# Patient Record
Sex: FEMALE | Race: WHITE | Employment: FULL TIME | URBAN - METROPOLITAN AREA
[De-identification: names, ages, dates, MRNs, and addresses within clinical notes are randomized per-mention and may not be internally consistent; named-entity substitution may affect disease eponyms.]

---

## 2017-03-07 DIAGNOSIS — I10 ESSENTIAL HYPERTENSION, BENIGN: ICD-10-CM

## 2017-03-07 RX ORDER — HYDROCHLOROTHIAZIDE 25 MG/1
TABLET ORAL
Qty: 30 TAB | Refills: 0 | Status: SHIPPED | OUTPATIENT
Start: 2017-03-07 | End: 2017-04-03 | Stop reason: SDUPTHER

## 2017-04-03 DIAGNOSIS — I10 ESSENTIAL HYPERTENSION, BENIGN: ICD-10-CM

## 2017-04-03 RX ORDER — HYDROCHLOROTHIAZIDE 25 MG/1
TABLET ORAL
Qty: 30 TAB | Refills: 0 | Status: SHIPPED | OUTPATIENT
Start: 2017-04-03 | End: 2017-05-15 | Stop reason: SDUPTHER

## 2017-04-03 NOTE — TELEPHONE ENCOUNTER
Overdue for f/u & labs. 1 month provided last time, but no evidence pt was ever contacted. shopp message sent. 1 month refilled.

## 2017-05-15 ENCOUNTER — OFFICE VISIT (OUTPATIENT)
Dept: INTERNAL MEDICINE CLINIC | Age: 72
End: 2017-05-15

## 2017-05-15 VITALS
BODY MASS INDEX: 42.88 KG/M2 | HEIGHT: 63 IN | WEIGHT: 242 LBS | RESPIRATION RATE: 16 BRPM | HEART RATE: 74 BPM | TEMPERATURE: 98 F | SYSTOLIC BLOOD PRESSURE: 140 MMHG | DIASTOLIC BLOOD PRESSURE: 96 MMHG | OXYGEN SATURATION: 96 %

## 2017-05-15 DIAGNOSIS — Z00.00 ROUTINE PHYSICAL EXAMINATION: Primary | ICD-10-CM

## 2017-05-15 DIAGNOSIS — Z13.820 OSTEOPOROSIS SCREENING: ICD-10-CM

## 2017-05-15 DIAGNOSIS — I10 ESSENTIAL HYPERTENSION, BENIGN: ICD-10-CM

## 2017-05-15 DIAGNOSIS — Z23 ENCOUNTER FOR IMMUNIZATION: ICD-10-CM

## 2017-05-15 DIAGNOSIS — J30.1 ALLERGIC RHINITIS DUE TO POLLEN, UNSPECIFIED RHINITIS SEASONALITY: ICD-10-CM

## 2017-05-15 DIAGNOSIS — F41.9 ANXIETY: ICD-10-CM

## 2017-05-15 DIAGNOSIS — Z12.39 BREAST CANCER SCREENING: ICD-10-CM

## 2017-05-15 DIAGNOSIS — Z71.89 ACP (ADVANCE CARE PLANNING): ICD-10-CM

## 2017-05-15 DIAGNOSIS — E66.01 MORBID OBESITY WITH BMI OF 40.0-44.9, ADULT (HCC): ICD-10-CM

## 2017-05-15 RX ORDER — HYDROCHLOROTHIAZIDE 25 MG/1
TABLET ORAL
Qty: 90 TAB | Refills: 1 | Status: SHIPPED | OUTPATIENT
Start: 2017-05-15 | End: 2017-11-07 | Stop reason: SDUPTHER

## 2017-05-15 RX ORDER — MOMETASONE FUROATE 50 UG/1
2 SPRAY, METERED NASAL AS NEEDED
Qty: 1 CONTAINER | Refills: 2 | Status: SHIPPED | OUTPATIENT
Start: 2017-05-15 | End: 2018-03-08 | Stop reason: SDUPTHER

## 2017-05-15 RX ORDER — ALPRAZOLAM 0.25 MG/1
TABLET ORAL
Qty: 10 TAB | Refills: 0 | Status: SHIPPED | OUTPATIENT
Start: 2017-05-15 | End: 2017-12-26 | Stop reason: SDUPTHER

## 2017-05-15 NOTE — ACP (ADVANCE CARE PLANNING)
Advance Care Planning (ACP) Provider Note - Comprehensive     Date of ACP Conversation: 05/15/17  Persons included in Conversation:  patient      Authorized Decision Maker (if patient is incapable of making informed decisions): This person is: Other Legally Authorized Decision Maker (e.g. Next of Kin)        General ACP for ALL Patients with Decision Making Capacity:  Importance of advance care planning, including choosing a healthcare agent to communicate patient's healthcare decisions if patient lost the ability to make decisions, such as after a sudden illness or accident  Understanding of the healthcare agent role was assessed and information provided  Opportunity offered to explore how cultural, Moravian, spiritual, or personal beliefs would affect decisions for future care     For Serious or Chronic Illness:  Her medical problems were reviewed with them. Understanding of medical condition    Understanding of CPR, goals and expected outcomes, benefits and burdens discussed. Information on CPR success rates provided (e.g. for CPR in hospital, survival to d/c at two weeks is 22%, for chronically ill or elderly/frail survival is less than 3%); Individual asked to communicate understanding of information in his/her own words. Interventions Provided:  Recommended completion of Advance Directive form after review of ACP materials and conversation with prospective healthcare agent   Recommended communicating the plan and making copies for the healthcare agent, personal physician, and others as appropriate (e.g., health system)  Recommended review of completed ACP document annually or upon change in health status      She  has NO advanced directive  - add't info provided. Reviewed DNR/DNI and patient is not interested.

## 2017-05-15 NOTE — PROGRESS NOTES
Laurel Wallace is a 71 y.o. female  who present for routine immunizations. she  denies any symptoms , reactions or allergies that would exclude them from being immunized today. Risks and adverse reactions were discussed and the VIS was given to them. All questions were addressed. she was observed for 5 min post injection. There were no reactions observed.     Marcus Allan RN

## 2017-05-15 NOTE — PROGRESS NOTES
Eliazar Azul is a 71 y.o. female who was seen in clinic today (5/15/2017) for a full physical.      Assessment & Plan: Albert Bains was seen today for complete physical.  Diagnoses and all orders for this visit:    Routine physical examination  -     HEPATITIS C AB  -     METABOLIC PANEL, COMPREHENSIVE  -     CBC W/O DIFF  -     HEMOGLOBIN A1C WITH EAG    ACP (advance care planning)  -     FULL CODE    Encounter for immunization  -     TETANUS, [address] [facility] (TDAP), IN INDIVIDS. >=7, IM  -     PNEUMOCOCCAL POLYSACCHARIDE VACCINE, 23-VALENT, ADULT OR IMMUNOSUPPRESSED PT DOSE,  -     MT IMMUNIZ,ADMIN,EACH ADDL  -     varicella zoster vacine live (ZOSTAVAX) 19,400 unit/0.65 mL susr injection; 1 Vial by SubCUTAneous route once for 1 dose. Breast cancer screening  -     MICK MAMMO BI SCREENING INCL CAD; Future    Osteoporosis screening  -     DEXA BONE DENSITY STUDY AXIAL; Future    Essential hypertension, benign- elevated currently, previously at goal, will continue to monitor (check amb BP, she will update me if it does not improve  -     hydroCHLOROthiazide (HYDRODIURIL) 25 mg tablet; TAKE 1 TABLET BY MOUTH DAILY. Anxiety- stable, reviewed treatment options with her, reviewed life style changes to help reduce stress, continue current treatment. [facility]  reviewed    -     ALPRAZolam (XANAX) 0.25 mg tablet; TAKE 1 TABLET BY MOUTH DAILY AS NEEDED FOR ANXIETY    Allergic rhinitis due to pollen, unspecified rhinitis seasonality- stable, continue current treatment, did review saline nasal rinses and OTC meds if gets worse  -     mometasone (NASONEX) 50 mcg/actuation nasal spray; 2 Sprays by Both Nostrils route as needed. Morbid obesity with BMI of 40.0-44.9, adult ([facility] [address]- poorly controlled, needs improvement, worsening, I have reviewed/discussed the above normal BMI with the patient.   I have recommended the following interventions: encourage exercise and lifestyle education regarding diet.          Follow-up Disposition:  Return in about 1 year (around 5/15/2018) for FULL PHYSICAL - 30 minutes. ------------------------------------------------------------------------------------------    Subjective:   Routine Physical Exam    End of Life Planning: This was discussed with her today and she has NO advanced directive  - add't info provided. Reviewed DNR/DNI and patient is not interested. Depression Screen:  PHQ over the last two weeks 5/15/2017   Little interest or pleasure in doing things Not at all   Feeling down, depressed or hopeless Not at all   Total Score PHQ 2 0         Fall Risk:   Fall Risk Assessment, last 12 mths 5/15/2017   Able to walk? Yes   Fall in past 12 months? No       Abuse Screen:  Abuse Screening Questionnaire 5/15/2017   Do you ever feel afraid of your partner? N   Are you in a relationship with someone who physically or mentally threatens you? N   Is it safe for you to go home? Y         Alcohol Risk Factor Screening: On any occasion during the past 3 months, have you had more than 3 drinks containing alcohol? No  Do you average more than 7 drinks per week? No    Hearing Loss:  denies any hearing loss    Activities of Daily Living:  Self-care. Requires assistance with: no ADLs    Adult Nutrition Screen:  No risk factors noted. Health Maintenance  Daily Aspirin: yes  Bone Density: not UTD - order placed  Glaucoma Screening: records requested    Immunizations:    Influenza: up to date. Tetanus: not up to date - will do today. Shingles: not UTD - script provided. Pneumonia: will do today. Cancer screening:    Cervical: declined, reviewed guidelines. Breast: order placed, reviewed guidelines, reviewed SBE with her. Colon: records requested, guidelines reviewed, referral placed for colonoscopy.          Patient Care Team:  Rachell Heard MD as PCP - General (Internal Medicine)  Michel Temple MD as Physician (Ophthalmology)  Kina Flores Casey Arreaga MD as Physician (Gastroenterology)       In addition to the above issues we also reviewed the following acute and/or chronic problems:    Cardiovascular Review  The patient has hypertension and obesity. Since last visit: weight is up, she attributes to decrease activity. She reports taking medications as instructed, no medication side effects noted, checking BP sporadically. Diet and Lifestyle: generally follows a low fat low cholesterol diet, generally follows a low sodium diet, sedentary. Lab review: labs reviewed and discussed with patient. Allergies  She has Allergic Rhinitis that vary in seasonal presentation. Current symptoms: rhinorrhea, postnasal drip, congestion and nothing. She reports: taking medications as instructed, no medication side effects noted. The following sections were reviewed & updated as appropriate: PMH, PSH, FH, and SH. Patient Active Problem List   Diagnosis Code    Essential hypertension, benign I10    Allergic rhinitis J30.9    Osteoarthritis M19.90    Obesity E66.9    DIONI (generalized anxiety disorder) F41.1    Vitamin D deficiency E55.9    Hyperlipidemia E78.5    Gastroesophageal reflux disease without esophagitis K21.9          Prior to Admission medications    Medication Sig Start Date End Date Taking? Authorizing Provider   hydroCHLOROthiazide (HYDRODIURIL) 25 mg tablet TAKE 1 TABLET BY MOUTH DAILY. 4/3/17  Yes Tomas Ely MD   ALPRAZolam Euell Melissa) 0.25 mg tablet TAKE 1 TABLET BY MOUTH DAILY AS NEEDED FOR ANXIETY 6/1/16  Yes Tomas Ely MD   mometasone (NASONEX) 50 mcg/actuation nasal spray 2 Sprays by Both Nostrils route as needed. 6/1/16  Yes Tomas Ely MD   red yeast rice 600 mg tab Take 1 Tab by mouth two (2) times a day. 10/22/15  Yes Tomas Ely MD   acetaminophen (TYLENOL EXTRA STRENGTH) 500 mg tablet Take 500 mg by mouth as needed for Pain.    Yes Historical Provider   lactobacillus rhamnosus gg 10 billion cell (CULTURELLE) 10 billion cell capsule Take 1 capsule by mouth daily. Yes Historical Provider   cholecalciferol, vitamin D3, (VITAMIN D3) 2,000 unit tab Take 1 tablet by mouth daily. Yes Historical Provider   aspirin 81 mg tablet Take 81 mg by mouth daily. Yes Historical Provider          Allergies   Allergen Reactions    Diovan [Valsartan] Other (comments)     headaches    Lisinopril Other (comments)     headaches    Pcn [Penicillins] Rash    Statins-Hmg-Coa Reductase Inhibitors Myalgia              Review of Systems   Constitutional: Negative for chills and fever. Respiratory: Negative for cough and shortness of breath. Cardiovascular: Negative for chest pain and palpitations. Gastrointestinal: Negative for abdominal pain, blood in stool, constipation, diarrhea, heartburn, nausea and vomiting. Musculoskeletal: Positive for joint pain (joints, worse w/ weight). Negative for myalgias. Neurological: Negative for tingling, focal weakness and headaches. Endo/Heme/Allergies: Does not bruise/bleed easily. Psychiatric/Behavioral: Negative for depression. The patient is not nervous/anxious and does not have insomnia. Increase in stress around her grandson. Father passed last year, typical teenage issues. Lives in [place]         Objective:   Physical Exam   Constitutional: She appears well-developed. No distress. HENT:   Right Ear: Tympanic membrane, external ear and ear canal normal.   Left Ear: Tympanic membrane, external ear and ear canal normal.   Nose: Nose normal.   Mouth/Throat: Uvula is midline, oropharynx is clear and moist and mucous membranes are normal. No posterior oropharyngeal erythema. Right ear is: 50% occluded with wet cerumen. Eyes: Conjunctivae and lids are normal. No scleral icterus. Neck: Neck supple. Carotid bruit is not present. No thyromegaly present. Cardiovascular: Regular rhythm and normal heart sounds. No murmur heard.   Pulses: Dorsalis pedis pulses are 2+ on the right side, and 2+ on the left side. Posterior tibial pulses are 2+ on the right side, and 2+ on the left side. Pulmonary/Chest: Effort normal and breath sounds normal. She has no wheezes. She has no rales. Abdominal: Soft. Bowel sounds are normal. She exhibits no mass. There is no hepatosplenomegaly. There is no tenderness. Musculoskeletal: Normal range of motion. She exhibits no edema. Cervical back: Normal.        Thoracic back: She exhibits no bony tenderness. Lumbar back: Normal.   Lymphadenopathy:     She has no cervical adenopathy. Neurological: She has normal strength. No sensory deficit. Skin: No rash noted. Psychiatric: She has a normal mood and affect. Her behavior is normal.          Visit Vitals    BP (!) 140/96    Pulse 74    Temp 98 °F (36.7 °C) (Oral)    Resp 16    Ht 5' 2.8\" (1.595 m)    Wt 242 lb (109.8 kg)    SpO2 96%    BMI 43.14 kg/m2          Advised her to call back or return to office if symptoms worsen/change/persist.  Discussed expected course/resolution/complications of diagnosis in detail with patient. Medication risks/benefits/costs/interactions/alternatives discussed with patient. She was given an after visit summary which includes diagnoses, current medications, & vitals. She expressed understanding with the diagnosis and plan.         Gavino Hartman MD

## 2017-11-07 DIAGNOSIS — I10 ESSENTIAL HYPERTENSION, BENIGN: ICD-10-CM

## 2017-11-07 RX ORDER — HYDROCHLOROTHIAZIDE 25 MG/1
TABLET ORAL
Qty: 7 TAB | Refills: 0 | Status: SHIPPED | OUTPATIENT
Start: 2017-11-07 | End: 2018-02-18 | Stop reason: SDUPTHER

## 2017-11-07 NOTE — TELEPHONE ENCOUNTER
Please call patient and send mychart. Never had labs done from the summer. 1 week of meds sent to the pharmacy, she MUST, have labs done this week.

## 2017-11-08 ENCOUNTER — PATIENT MESSAGE (OUTPATIENT)
Dept: INTERNAL MEDICINE CLINIC | Age: 72
End: 2017-11-08

## 2017-11-11 DIAGNOSIS — I10 ESSENTIAL HYPERTENSION, BENIGN: ICD-10-CM

## 2017-11-12 RX ORDER — HYDROCHLOROTHIAZIDE 25 MG/1
TABLET ORAL
Qty: 7 TAB | Refills: 0 | OUTPATIENT
Start: 2017-11-12

## 2017-11-13 NOTE — TELEPHONE ENCOUNTER
Called pt and left a detailed message informing her she is due now for labs and she should have about 3 more days of medication left, and to please call our office as soon as she receives this message.

## 2017-11-13 NOTE — TELEPHONE ENCOUNTER
Please call pt. Pt needs to get labs done ASAP. She was previously given 7 days of meds and instructed to get labs done. She did not. Should have 3 more days of meds. Needs to get labs done.

## 2017-12-26 ENCOUNTER — OFFICE VISIT (OUTPATIENT)
Dept: INTERNAL MEDICINE CLINIC | Age: 72
End: 2017-12-26

## 2017-12-26 VITALS
WEIGHT: 235 LBS | HEART RATE: 80 BPM | SYSTOLIC BLOOD PRESSURE: 140 MMHG | DIASTOLIC BLOOD PRESSURE: 84 MMHG | RESPIRATION RATE: 16 BRPM | HEIGHT: 63 IN | OXYGEN SATURATION: 98 % | TEMPERATURE: 98 F | BODY MASS INDEX: 41.64 KG/M2

## 2017-12-26 DIAGNOSIS — E66.01 OBESITY, MORBID (HCC): ICD-10-CM

## 2017-12-26 DIAGNOSIS — F41.9 ANXIETY: ICD-10-CM

## 2017-12-26 DIAGNOSIS — M79.604 LEG PAIN, RIGHT: Primary | ICD-10-CM

## 2017-12-26 RX ORDER — ALPRAZOLAM 0.25 MG/1
TABLET ORAL
Qty: 10 TAB | Refills: 0 | Status: SHIPPED | OUTPATIENT
Start: 2017-12-26 | End: 2018-06-13 | Stop reason: SDUPTHER

## 2017-12-26 RX ORDER — TIZANIDINE 2 MG/1
2 TABLET ORAL
Qty: 7 TAB | Refills: 0 | Status: SHIPPED | OUTPATIENT
Start: 2017-12-26 | End: 2018-01-18 | Stop reason: SDUPTHER

## 2017-12-26 NOTE — PATIENT INSTRUCTIONS
Heat: apply heating pad 10-15 minutes at a time 3-4 times per day    Medications:  Ibuprofen/Advil: 200mg (1-3 tabs every 4-6 hours, take with food)        OR  Naproxen/Aleve: 220mg (1-2 tabs every 12 hours, take with food)

## 2017-12-26 NOTE — PROGRESS NOTES
Karen Victor is a 71 y.o. female who was seen in clinic today (12/26/2017). Assessment & Plan:   Diagnoses and all orders for this visit:    1. Leg pain, right- this is a new problem, symptoms are: fluctuating, differential dx reviewed with the patient, sounds muscular. Reviewed why I don't think this a DVT, tendonitis, or sciatica. Will treat with: heat, NSAIDs, rest, stretching, meds below to use prn. See AVS, Red flags were reviewed with the patient to RTC or notify me, expected time course for resolution reviewed. -     tiZANidine (ZANAFLEX) 2 mg tablet; Take 1 Tab by mouth daily as needed. 2. Anxiety- fluctuating, [facility]  reviewed, cont prn use of meds, reviewed life style changes  -     ALPRAZolam (XANAX) 0.25 mg tablet; TAKE 1 TABLET BY MOUTH DAILY AS NEEDED FOR ANXIETY    3. Obesity, morbid ([address]- stable, needs improvement, I have reviewed/discussed the above normal BMI with the patient. I have recommended the following interventions: encourage exercise and lifestyle education regarding diet. Follow-up Disposition:  Return if symptoms worsen or fail to improve. Subjective: Bola Webb was seen today for Leg Pain    She presents do to pain of her right leg that is secondary to no known injury and started a week ago. Since it started her pain has worsened. She describes the pain as aching. It is intermittent. The pain radiates: no where. Located on lateral side of the knee and calf. She denies weakness, denies numbness, denies paresthesias. Exacerbating factors identifiable by patient are sitting or going down stairs. Previous workup: none. Started a few months ago with L leg pain. This resolved. Prior to Admission medications    Medication Sig Start Date End Date Taking? Authorizing Provider   hydroCHLOROthiazide (HYDRODIURIL) 25 mg tablet TAKE 1 TABLET BY MOUTH DAILY.  11/7/17  Yes Fernando Watkins MD   mometasone (NASONEX) 50 mcg/actuation nasal spray 2 Sprays by Both Nostrils route as needed. 5/15/17  Yes Prince Anisa MD   ALPRAZolam Marlyn Babinski) 0.25 mg tablet TAKE 1 TABLET BY MOUTH DAILY AS NEEDED FOR ANXIETY 5/15/17  Yes Prince Anisa MD   red yeast rice 600 mg tab Take 1 Tab by mouth two (2) times a day. 10/22/15  Yes Prince Anisa MD   acetaminophen (TYLENOL EXTRA STRENGTH) 500 mg tablet Take 500 mg by mouth as needed for Pain. Yes Historical Provider   lactobacillus rhamnosus gg 10 billion cell (CULTURELLE) 10 billion cell capsule Take 1 capsule by mouth daily. Yes Historical Provider   cholecalciferol, vitamin D3, (VITAMIN D3) 2,000 unit tab Take 1 tablet by mouth daily. Yes Historical Provider   aspirin 81 mg tablet Take 81 mg by mouth daily. Yes Historical Provider          Allergies   Allergen Reactions    Diovan [Valsartan] Other (comments)     headaches    Lisinopril Other (comments)     headaches    Pcn [Penicillins] Rash    Statins-Hmg-Coa Reductase Inhibitors Myalgia           Review of Systems   Constitutional: Negative for malaise/fatigue and weight loss. Respiratory: Negative for cough and shortness of breath. Cardiovascular: Negative for chest pain and palpitations. Gastrointestinal: Negative for abdominal pain, constipation, diarrhea, nausea and vomiting. Musculoskeletal: Negative for back pain and joint pain. Objective:   Physical Exam   Cardiovascular: Regular rhythm and normal heart sounds. No murmur heard. Pulmonary/Chest: Effort normal and breath sounds normal. She has no wheezes. She has no rales. Musculoskeletal:        Right knee: She exhibits normal range of motion and no swelling. No tenderness found. No medial joint line and no lateral joint line tenderness noted. Lumbar back: She exhibits normal range of motion, no tenderness and no bony tenderness. Right lower leg: She exhibits no tenderness, no bony tenderness, no swelling and no edema.    Neurological:   Straight leg raise: negative  Strength is: 5/5 in lower extremities. Sensation is intact to light touch in lower extremities            Visit Vitals    /84    Pulse 80    Temp 98 °F (36.7 °C) (Oral)    Resp 16    Ht 5' 2.8\" (1.595 m)    Wt 235 lb (106.6 kg)    SpO2 98%    BMI 41.89 kg/m2         Disclaimer:  Advised her to call back or return to office if symptoms worsen/change/persist.  Discussed expected course/resolution/complications of diagnosis in detail with patient. Medication risks/benefits/costs/interactions/alternatives discussed with patient. She was given an after visit summary which includes diagnoses, current medications, & vitals. She expressed understanding with the diagnosis and plan. Aspects of this note may have been generated using voice recognition software. Despite editing, there may be some syntax errors.        Mray Beth Busby MD

## 2018-01-08 ENCOUNTER — DOCUMENTATION ONLY (OUTPATIENT)
Dept: INTERNAL MEDICINE CLINIC | Age: 73
End: 2018-01-08

## 2018-01-08 NOTE — PROGRESS NOTES
Call to patient, advised Dr. Luciano Jerome did not have a record of colonoscopy. She will check her records at home and send me a [employer] message.

## 2018-01-09 LAB
ALBUMIN SERPL-MCNC: 4.4 G/DL (ref 3.5–4.8)
ALBUMIN/GLOB SERPL: 1.9 {RATIO} (ref 1.2–2.2)
ALP SERPL-CCNC: 70 IU/L (ref 39–117)
ALT SERPL-CCNC: 15 IU/L (ref 0–32)
AST SERPL-CCNC: 12 IU/L (ref 0–40)
BILIRUB SERPL-MCNC: 0.7 MG/DL (ref 0–1.2)
BUN SERPL-MCNC: 21 MG/DL (ref 8–27)
BUN/CREAT SERPL: 26 (ref 12–28)
CALCIUM SERPL-MCNC: 9.4 MG/DL (ref 8.7–10.3)
CHLORIDE SERPL-SCNC: 96 MMOL/L (ref 96–106)
CO2 SERPL-SCNC: 25 MMOL/L (ref 18–29)
CREAT SERPL-MCNC: 0.82 MG/DL (ref 0.57–1)
ERYTHROCYTE [DISTWIDTH] IN BLOOD BY AUTOMATED COUNT: 14.2 % (ref 12.3–15.4)
EST. AVERAGE GLUCOSE BLD GHB EST-MCNC: 103 MG/DL
GLOBULIN SER CALC-MCNC: 2.3 G/DL (ref 1.5–4.5)
GLUCOSE SERPL-MCNC: 126 MG/DL (ref 65–99)
HBA1C MFR BLD: 5.2 % (ref 4.8–5.6)
HCT VFR BLD AUTO: 42.1 % (ref 34–46.6)
HCV AB S/CO SERPL IA: <0.1 S/CO RATIO (ref 0–0.9)
HGB BLD-MCNC: 14.7 G/DL (ref 11.1–15.9)
MCH RBC QN AUTO: 31.2 PG (ref 26.6–33)
MCHC RBC AUTO-ENTMCNC: 34.9 G/DL (ref 31.5–35.7)
MCV RBC AUTO: 89 FL (ref 79–97)
PLATELET # BLD AUTO: 210 X10E3/UL (ref 150–379)
POTASSIUM SERPL-SCNC: 3.1 MMOL/L (ref 3.5–5.2)
PROT SERPL-MCNC: 6.7 G/DL (ref 6–8.5)
RBC # BLD AUTO: 4.71 X10E6/UL (ref 3.77–5.28)
SODIUM SERPL-SCNC: 141 MMOL/L (ref 134–144)
WBC # BLD AUTO: 4.1 X10E3/UL (ref 3.4–10.8)

## 2018-01-09 RX ORDER — POTASSIUM CHLORIDE 750 MG/1
10 TABLET, EXTENDED RELEASE ORAL DAILY
Qty: 90 TAB | Refills: 1 | Status: SHIPPED | OUTPATIENT
Start: 2018-01-09 | End: 2020-01-02 | Stop reason: SDUPTHER

## 2018-01-10 NOTE — PROGRESS NOTES
Results released to patient via [employer]. All labs are stable or at goal for her, except for elevated BS (not sure if fasting) and A1c is normal.  K is low. Will start KCl.

## 2018-01-11 DIAGNOSIS — M79.604 LEG PAIN, RIGHT: Primary | ICD-10-CM

## 2018-01-17 ENCOUNTER — HOSPITAL ENCOUNTER (OUTPATIENT)
Dept: PHYSICAL THERAPY | Age: 73
Discharge: HOME | End: 2018-01-17
Payer: COMMERCIAL

## 2018-01-17 PROCEDURE — 97110 THERAPEUTIC EXERCISES: CPT | Performed by: PHYSICAL THERAPIST

## 2018-01-17 PROCEDURE — 97161 PT EVAL LOW COMPLEX 20 MIN: CPT | Performed by: PHYSICAL THERAPIST

## 2018-01-17 NOTE — PROGRESS NOTES
[facility]  [address] [facility], [address] [place] [address]  Phone: [phone]  Fax: [phone]    Plan of Care/Statement of Necessity for Physical Therapy Services  2-15    Patient name: Yakelin De La Vega  : 1945  Provider#: [id number]  Referral source: Jyothi Bautista MD      Medical/Treatment Diagnosis: Pain in right knee [M25.561]     Prior Hospitalization: see medical history     Comorbidities: see evaluation  Prior Level of Function: see evaluation  Medications: Verified on Patient Summary List    Start of Care: 19      Onset Date: approx 8 weeks ago       The Plan of Care and following information is based on the information from the initial evaluation. Assessment/ key information: Pt is a 71year old female presenting with signs and symptoms consistent with R gastroc strain.  She will benefit from PT to address problem list below    Evaluation Complexity History MEDIUM  Complexity : 1-2 comorbidities / personal factors will impact the outcome/ POC ; Examination LOW Complexity : 1-2 Standardized tests and measures addressing body structure, function, activity limitation and / or participation in recreation  ;Presentation LOW Complexity : Stable, uncomplicated  ;Clinical Decision Making MEDIUM Complexity : FOTO score of 26-74  Overall Complexity Rating: LOW     Problem List: pain affecting function, decrease strength, impaired gait/ balance, decrease ADL/ functional abilitiies, decrease activity tolerance and decrease flexibility/ joint mobility   Treatment Plan may include any combination of the following: Therapeutic exercise, Therapeutic activities, Neuromuscular re-education, Physical agent/modality, Gait/balance training, Manual therapy, Patient education, Self Care training, Functional mobility training, Home safety training and Stair training  Patient / Family readiness to learn indicated by: asking questions, trying to perform skills and interest  Persons(s) to be included in education: patient (P)  Barriers to Learning/Limitations: None  Patient Goal (s): see evaluation  Patient Self Reported Health Status: good  Rehabilitation Potential: good    Short Term Goals: To be accomplished in 2-3 weeks:  1) Pt will be independent in initial HEP  2) Pt will report > or =25% decrease in exacerbation of symptoms    Long Term Goals: To be accomplished in 4-6 weeks:  1) Pt will report being able to ambulate for > or =30 min with minimal to no pain in order to grocery shop  2) Pt will perform unilateral R HR > or =50% in to aid in performance of ADL's  3) Pt will improve FOTO score to > or = 42/100 in order to demonstrate improvement in functional mobility    Frequency / Duration: Patient to be seen 1-2 times per week for 4-6 weeks. Patient/ Caregiver education and instruction: self care, activity modification and exercises    [x]  Plan of care has been reviewed with CHARLOTTE Giles, PT 1/17/2018 2:58 PM    ________________________________________________________________________    I certify that the above Therapy Services are being furnished while the patient is under my care. I agree with the treatment plan and certify that this therapy is necessary.     [de-identified] Signature:____________________  Date:____________Time: _________

## 2018-01-17 NOTE — PROGRESS NOTES
[facility] Physical Therapy and Sports Medicine  [address], [place], [address]  Phone: [phone]  Fax: [phone]      PT INITIAL EVALUATION NOTE - [facility] 2-15    Patient Name: Yancy Liao  Date:2018  : 1945  [x]  Patient  Verified  Payor: Maria Esther Fall / Plan: Stephon Lowe / Product Type: PPO /    In time:3:00 PM  Out time:4:00 PM  Total Treatment Time (min): 60  Total Timed Codes (min): 15  1:1 Treatment Time ([facility] only): --   Visit #: 1     Treatment Area: Pain in right knee [M25.561]    SUBJECTIVE  Any medication changes, allergies to medications, adverse drug reactions, diagnosis change, or new procedure performed?: [] No    [x] Yes (see summary sheet for update)    Current symptoms/chief complaint:   Pt presents with R knee pain-- she points to lateral/posterior aspect of knee. She had history of L knee pain a few months ago that went away- \"I think I was favoring my right side when my left was hurting and that's how I injured it\". Pain with standing after sitting; pain with prolonged walking; pain going down stairs. Pain radiates from behind her knee to her heel. Denies numbness/tingling. Describes pain as throbbing, achy. She states Dr. Joaquin Sanchez advised her to use heat and rest prior to coming to therapy which as helped decrease the pain. She states she has a brace that she sometimes wears to \"remind me to not do anything\"    Date of onset/injury: approx 8 weeks ago    Aggravated by: see above    Eased by:  heat    Pain:   7/10 max 2/10 min 3/10 now       Location and description of symptoms: throbbing/achy; lateral/posterior aspect of R knee down to heel    Diagnostic Tests: [] Lab work [x] [facility]    [facility] CT [] MRI     [] Other:  Results (per report of the patient): n/a    PMHX (per CC):  Significant for   Patient Active Problem List   Diagnosis Code    Essential hypertension, benign I10    Allergic rhinitis J30.9    Osteoarthritis M19.90    Obesity E66.9    DIONI (generalized anxiety disorder) F41.1    Vitamin D deficiency E55.9    Hyperlipidemia E78.5    Gastroesophageal reflux disease without esophagitis K21.9     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 Prior level of function: Used to walk for approx 2 miles per day without pain; able to get up from seated position without pain    Patient goal(s): \"get better mobility; have less pain\"    Objective:    Posture/Observations: poor sitting posture     Gait:    Pt ambulates independently without AD. dec'd hip ext bilaterally         ROM  AROM R knee WNL (p! With PROM flexion-- pt points to posterior/lateral knee)  AROM L knee WNL    Strength (1-5)           Right Left Comments   Hip flexion 4 4    Knee ext 4+ 4+    Knee flex 4 4+ P! On R   Ankle DF 5 5    No pain with prone HS strength testing     Functional Biomechanical Screen  Bilat HR: 75%  Unilat HR on R: unable to perform. Unilat HR on L: 25%  SLS on R: 2 sec. Large postural sway  SLS on L: 1-2 sec. Large postural sway                                Palpation:  Min to mod TTP R gastroc muscle belly and lateral>medial head, soleus, achilles tendon    Optional Tests  Lachmann's:  [] Neg    [] Pos  Ant. Drawer:  [x] Neg    [] Pos  Post. Drawer:  [x] Neg    [] Pos  Antony's:  [x] Neg    [] Pos  Thessaly's:  [x] Neg    [] Pos  Valgus Stress Test:  [x] Neg    [] Pos  @ 0 deg. Ext: @ 20 deg Ext:  Varus Stress Test:  [x] Neg    [] Pos @ 0 deg. Ext:  @ 20 deg Ext:      Outcome Measure:  FOTO= 42/100     OBJECTIVE    15 min Therapeutic Exercise:  [x] See flow sheet : calf stretch, HS stretch, TB PF   Rationale: increase ROM and increase strength to improve the patients ability to grocery shop; perform ADL's    10 min-- heat.  R gastroc muscle belly          With   [x] TE   [] TA   [] neuro   [] other: Patient Education: [x] Review HEP    [] Progressed/Changed HEP based on:   [] positioning   [] body mechanics   [] transfers   [x] heat/ice application    [x] other: jovana/phys; PT POC; importance of performing HEP in order to maximize benefit of PT; use of heat vs. Ice to mange symptoms     Pain Level (0-10 scale) post treatment: \"good after that heat\"    Assessment: See POC    Plan: See Alan Vidal PT, DPT    1/17/2018

## 2018-01-18 DIAGNOSIS — M79.604 LEG PAIN, RIGHT: ICD-10-CM

## 2018-01-18 RX ORDER — TIZANIDINE 2 MG/1
2 TABLET ORAL
Qty: 10 TAB | Refills: 0 | Status: SHIPPED | OUTPATIENT
Start: 2018-01-18 | End: 2018-02-27 | Stop reason: SDUPTHER

## 2018-01-19 ENCOUNTER — HOSPITAL ENCOUNTER (OUTPATIENT)
Dept: PHYSICAL THERAPY | Age: 73
Discharge: HOME | End: 2018-01-19
Payer: COMMERCIAL

## 2018-01-19 PROCEDURE — 97110 THERAPEUTIC EXERCISES: CPT | Performed by: PHYSICAL THERAPIST

## 2018-01-19 PROCEDURE — 97140 MANUAL THERAPY 1/> REGIONS: CPT | Performed by: PHYSICAL THERAPIST

## 2018-01-19 NOTE — PROGRESS NOTES
PT DAILY TREATMENT NOTE 2-15    Patient Name: Skinny Rodriguez  Date:2018  : 1945  [x]  Patient  Verified  Payor: Toni Erwin / Plan: [id number] / Product Type: PPO /    In time: 8:00 AM  Out time:9:00 AM  Total Treatment Time (min): 60 (45 timed)  Visit #: 2     Treatment Area: Pain in right knee [M25.561]    SUBJECTIVE  Pain Level (0-10 scale): 2/10  Any medication changes, allergies to medications, adverse drug reactions, diagnosis change, or new procedure performed?: [x] No    [] Yes (see summary sheet for update)  Subjective functional status/changes:   [] No changes reported  Pt states that her pain was up to a 6/10 last night. \"maybe just from doing the exercises? \"    OBJECTIVE    Modality rationale: decrease inflammation, decrease pain and increase tissue extensibility to improve the patients ability to perform ADL's   Min Type Additional Details    [] Estim: []Att   []Unatt        []TENS instruct                  []IFC  []Premod   []NMES                     []Other:  []w/US   []w/ice   []w/heat  Position:  Location:    []  Traction: [] Cervical       []Lumbar                       [] Prone          []Supine                       []Intermittent   []Continuous Lbs:  [] before manual  [] after manual  []w/heat    []  Ultrasound: []Continuous   [] Pulsed at:                            []1MHz   []3MHz Location:  W/cm2:    []  Paraffin         Location:  []w/heat   Heat 5  Ice 10 [x]  Ice- post     []  Heat- pre  []  Ice massage Position:  Location:    []  Laser  []  Other: Position:  Location:    []  Vasopneumatic Device Pressure:       [] lo [] med [] hi   Temperature:    [x] Skin assessment post-treatment:  [x]intact []redness- no adverse reaction    []redness - adverse reaction:     30 min Therapeutic Exercise:  [x] See flow sheet :   Rationale: increase ROM, increase strength and improve balance to improve the patients ability to perform ADL's    15 min Manual Therapy: Roller over [employer] lateral>medial  STM/cross-friction massage over R gastroc   Rationale: decrease pain, increase ROM and increase tissue extensibility  to improve the patients ability to ambulate          With   [] TE   [] TA   [] neuro   [] other: Patient Education: [x] Review HEP    [] Progressed/Changed HEP based on:   [] positioning   [] body mechanics   [] transfers   [] heat/ice application    [] other:      Other Objective/Functional Measures: n/a     Pain Level (0-10 scale) post treatment: 0/10 \"better\"    ASSESSMENT/Changes in Function:   Tolerated manual therapy well, dec'd symptoms after. Challenged with balance exercises. Patient will continue to benefit from skilled PT services to modify and progress therapeutic interventions, address functional mobility deficits, address ROM deficits, address strength deficits, analyze and address soft tissue restrictions, analyze and cue movement patterns and analyze and modify body mechanics/ergonomics to attain remaining goals.      []  See Plan of Care  []  See progress note/recertification  []  See Discharge Summary         Progress towards goals / Updated goals:  nt    PLAN  []  Upgrade activities as tolerated     [x]  Continue plan of care  []  Update interventions per flow sheet       []  Discharge due to:_  []  Other:_      Jameson Weaver, PT 1/19/2018  8:09 AM

## 2018-01-23 ENCOUNTER — HOSPITAL ENCOUNTER (OUTPATIENT)
Dept: PHYSICAL THERAPY | Age: 73
Discharge: HOME | End: 2018-01-23
Payer: COMMERCIAL

## 2018-01-23 PROCEDURE — 97110 THERAPEUTIC EXERCISES: CPT | Performed by: PHYSICAL THERAPIST

## 2018-01-23 PROCEDURE — 97140 MANUAL THERAPY 1/> REGIONS: CPT | Performed by: PHYSICAL THERAPIST

## 2018-01-23 NOTE — PROGRESS NOTES
PT DAILY TREATMENT NOTE 2-15    Patient Name: Kami Sy  Date:2018  : 1945  [x]  Patient  Verified  Payor: Elaine Baumgarten / Plan: Chalino Adams / Product Type: PPO /    In time: 9:10 AM  Out time: 10:00 AM  Total Treatment Time (min): 50 (45 timed)  Visit #: 3     Treatment Area: Pain in right knee [M25.561]    SUBJECTIVE  Pain Level (0-10 scale): 10  Any medication changes, allergies to medications, adverse drug reactions, diagnosis change, or new procedure performed?: [x] No    [] Yes (see summary sheet for update)  Subjective functional status/changes:   [] No changes reported  \"I was doing fine until I was in stop and go traffic getting here and was constantly stepping on/off the pedal\".  She states soreness in calf after last visit- \"they next day was one of my better days\"    OBJECTIVE    Modality rationale: decrease inflammation, decrease pain and increase tissue extensibility to improve the patients ability to perform ADL's   Min Type Additional Details    [] Estim: []Att   []Unatt        []TENS instruct                  []IFC  []Premod   []NMES                     []Other:  []w/US   []w/ice   []w/heat  Position:  Location:    []  Traction: [] Cervical       []Lumbar                       [] Prone          []Supine                       []Intermittent   []Continuous Lbs:  [] before manual  [] after manual  []w/heat    []  Ultrasound: []Continuous   [] Pulsed at:                            []1MHz   []3MHz Location:  W/cm2:    []  Paraffin         Location:  []w/heat   Heat 5  Ice to go [x]  Ice- post     []  Heat- pre  []  Ice massage Position:  Location:    []  Laser  []  Other: Position:  Location:    []  Vasopneumatic Device Pressure:       [] lo [] med [] hi   Temperature:    [x] Skin assessment post-treatment:  [x]intact []redness- no adverse reaction    []redness - adverse reaction:     30 min Therapeutic Exercise:  [x] See flow sheet :   Rationale: increase ROM, increase strength and improve balance to improve the patients ability to perform ADL's    15 min Manual Therapy:  Roller over R gastroc lateral>medial  STM/cross-friction massage over R gastroc  IASTM using \"boomerang\" tool, feathering technique used over lateral>medial gastroc   Rationale: decrease pain, increase ROM and increase tissue extensibility  to improve the patients ability to ambulate          With   [] TE   [] TA   [] neuro   [] other: Patient Education: [x] Review HEP    [] Progressed/Changed HEP based on:   [] positioning   [] body mechanics   [] transfers   [] heat/ice application    [] other:      Other Objective/Functional Measures: n/a     Pain Level (0-10 scale) post treatment: \"sore\"    ASSESSMENT/Changes in Function:   Updated HEP to include SLS and standing HS curls. Overall tolerated therapy session well. Patient will continue to benefit from skilled PT services to modify and progress therapeutic interventions, address functional mobility deficits, address ROM deficits, address strength deficits, analyze and address soft tissue restrictions, analyze and cue movement patterns and analyze and modify body mechanics/ergonomics to attain remaining goals.      []  See Plan of Care  []  See progress note/recertification  []  See Discharge Summary         Progress towards goals / Updated goals:  nt    PLAN  []  Upgrade activities as tolerated     [x]  Continue plan of care  []  Update interventions per flow sheet       []  Discharge due to:_  []  Other:_      Lizzie Asif PT 1/23/2018  9:11 AM

## 2018-01-25 ENCOUNTER — HOSPITAL ENCOUNTER (OUTPATIENT)
Dept: PHYSICAL THERAPY | Age: 73
Discharge: HOME | End: 2018-01-25
Payer: COMMERCIAL

## 2018-01-25 PROCEDURE — 97530 THERAPEUTIC ACTIVITIES: CPT | Performed by: PHYSICAL THERAPIST

## 2018-01-25 PROCEDURE — 97140 MANUAL THERAPY 1/> REGIONS: CPT | Performed by: PHYSICAL THERAPIST

## 2018-01-25 PROCEDURE — 97110 THERAPEUTIC EXERCISES: CPT | Performed by: PHYSICAL THERAPIST

## 2018-01-25 NOTE — PROGRESS NOTES
PT DAILY TREATMENT NOTE 2-15    Patient Name: Donny Lion  Date:2018  : 1945  [x]  Patient  Verified  Payor: Nasima Parks / Plan: Ethan Shell / Product Type: PPO /    In time: 8:00 AM  Out time: 9:20 AM  Total Treatment Time (min): 80 (65 timed)  Visit #: 4    Treatment Area: Pain in right knee [M25.561]    SUBJECTIVE  Pain Level (0-10 scale): 3-4/10  Any medication changes, allergies to medications, adverse drug reactions, diagnosis change, or new procedure performed?: [x] No    [] Yes (see summary sheet for update)  Subjective functional status/changes:   [] No changes reported  Pt states that she did a lot yesterday-- she put away [employer], pedaled on her bike at home, and went to the grocery store.  She had to take a muscle relaxer last night- \"it helped some but I was up at 4 am with my leg hurting\"  She states inc'd pain with stairs, walking-- virgen up hills, getting in/out of  seat in car    OBJECTIVE    Modality rationale: decrease inflammation, decrease pain and increase tissue extensibility to improve the patients ability to perform ADL's   Min Type Additional Details    [] Estim: []Att   []Unatt        []TENS instruct                  []IFC  []Premod   []NMES                     []Other:  []w/US   []w/ice   []w/heat  Position:  Location:    []  Traction: [] Cervical       []Lumbar                       [] Prone          []Supine                       []Intermittent   []Continuous Lbs:  [] before manual  [] after manual  []w/heat    []  Ultrasound: []Continuous   [] Pulsed at:                            []1MHz   []3MHz Location:  W/cm2:    []  Paraffin         Location:  []w/heat   Heat 5  Ice 10 [x]  Ice- post     []  Heat- pre  []  Ice massage Position:  Location:    []  Laser  []  Other: Position:  Location:    []  Vasopneumatic Device Pressure:       [] lo [] med [] hi   Temperature:    [x] Skin assessment post-treatment:  [x]intact []redness- no adverse reaction    []redness - adverse reaction:     35 min Therapeutic Exercise:  [x] See flow sheet :   Rationale: increase ROM, increase strength and improve balance to improve the patients ability to perform ADL's    15 min Therapeutic Activity:  [x]  See flow sheet : Pt educated on and demonstrated proper sequencing while ascending/descending stairs. Performed step ups on 4 in step. Also educated pt on various ways to try getting out of car to decrease symptoms in R gastroc and knee    Rationale: increase ROM and increase strength  to improve the patients ability to walk up/down stairs, transfer in/out of car    15 min Manual Therapy:  Roller over R gastroc lateral>medial  STM/cross-friction massage over R gastroc  IASTM using \"boomerang\" tool, feathering technique used over lateral>medial gastroc   Rationale: decrease pain, increase ROM and increase tissue extensibility  to improve the patients ability to ambulate          With   [] TE   [] TA   [] neuro   [] other: Patient Education: [x] Review HEP    [] Progressed/Changed HEP based on:   [] positioning   [] body mechanics   [] transfers   [] heat/ice application    [] other:      Other Objective/Functional Measures: Inc'd muscle turgor lateral>medial gastroc     Pain Level (0-10 scale) post treatment: 0/10    ASSESSMENT/Changes in Function:   Reviewed functional tasks today and how to modify in order to decrease symptoms (see TherAct above). Strongly encouraged pt to trial with rest and limited activity over the weekend-- to perform all HEP on a regular basis. Patient will continue to benefit from skilled PT services to modify and progress therapeutic interventions, address functional mobility deficits, address ROM deficits, address strength deficits, analyze and address soft tissue restrictions, analyze and cue movement patterns and analyze and modify body mechanics/ergonomics to attain remaining goals.      []  See Plan of Care  []  See progress note/recertification  []  See Discharge Summary         Progress towards goals / Updated goals:  nt    PLAN  []  Upgrade activities as tolerated     [x]  Continue plan of care  []  Update interventions per flow sheet       []  Discharge due to:_  []  Other:_      Toshia Mcdermott, PT 1/25/2018  8:12 AM

## 2018-01-30 ENCOUNTER — HOSPITAL ENCOUNTER (OUTPATIENT)
Dept: PHYSICAL THERAPY | Age: 73
Discharge: HOME | End: 2018-01-30
Payer: COMMERCIAL

## 2018-01-30 PROCEDURE — 97140 MANUAL THERAPY 1/> REGIONS: CPT | Performed by: PHYSICAL THERAPIST

## 2018-01-30 PROCEDURE — 97110 THERAPEUTIC EXERCISES: CPT | Performed by: PHYSICAL THERAPIST

## 2018-01-30 NOTE — PROGRESS NOTES
PT DAILY TREATMENT NOTE 2-15    Patient Name: Jacinto Doing  Date:2018  : 1945  [x]  Patient  Verified  Payor: Kary Yvrose / Plan: [address] / Product Type: PPO /    In time: 8:45 AM  Out time: 9:55 AM  Total Treatment Time (min):  70 (55 timed)  Visit #: 5    Treatment Area: Pain in right knee [M25.561]    SUBJECTIVE  Pain Level (0-10 scale): 3/10  Any medication changes, allergies to medications, adverse drug reactions, diagnosis change, or new procedure performed?: [x] No    [] Yes (see summary sheet for update)  Subjective functional status/changes:   [] No changes reported  Pt reports increased pain on Saturday, however it has gotten better since.  \"It was good this morning until I was stepping on and off the gas pedal for an hour to get here\"    OBJECTIVE    Modality rationale: decrease inflammation, decrease pain and increase tissue extensibility to improve the patients ability to perform ADL's   Min Type Additional Details    [] Estim: []Att   []Unatt        []TENS instruct                  []IFC  []Premod   []NMES                     []Other:  []w/US   []w/ice   []w/heat  Position:  Location:    []  Traction: [] Cervical       []Lumbar                       [] Prone          []Supine                       []Intermittent   []Continuous Lbs:  [] before manual  [] after manual  []w/heat    []  Ultrasound: []Continuous   [] Pulsed at:                            []1MHz   []3MHz Location:  W/cm2:    []  Paraffin         Location:  []w/heat   Heat 5  Ice 10 [x]  Ice- post     []  Heat- pre  []  Ice massage Position:  Location:    []  Laser  []  Other: Position:  Location:    []  Vasopneumatic Device Pressure:       [] lo [] med [] hi   Temperature:    [x] Skin assessment post-treatment:  [x]intact []redness- no adverse reaction    []redness - adverse reaction:     45 min Therapeutic Exercise:  [x] See flow sheet :   Rationale: increase ROM, increase strength and improve balance to improve the patients ability to perform ADL's    -- min Therapeutic Activity:  [x]  See flow sheet :    Rationale: increase ROM and increase strength  to improve the patients ability to walk up/down stairs, transfer in/out of car    10 min Manual Therapy:  Roller over R gastroc lateral>medial  STM/cross-friction massage over R gastroc-- HELD  IASTM using \"boomerang\" tool, feathering technique used over lateral>medial gastroc   Rationale: decrease pain, increase ROM and increase tissue extensibility  to improve the patients ability to ambulate          With   [] TE   [] TA   [] neuro   [] other: Patient Education: [x] Review HEP    [] Progressed/Changed HEP based on:   [] positioning   [] body mechanics   [] transfers   [] heat/ice application    [] other:      Other Objective/Functional Measures: Inc'd muscle turgor lateral>medial gastroc     Pain Level (0-10 scale) post treatment: 0/10    ASSESSMENT/Changes in Function:   Held on STM/TPR over gastroc today due to slight bruising over region. Progressing strengthening as tolerated. Patient will continue to benefit from skilled PT services to modify and progress therapeutic interventions, address functional mobility deficits, address ROM deficits, address strength deficits, analyze and address soft tissue restrictions, analyze and cue movement patterns and analyze and modify body mechanics/ergonomics to attain remaining goals.      []  See Plan of Care  []  See progress note/recertification  []  See Discharge Summary         Progress towards goals / Updated goals:  nt    PLAN  []  Upgrade activities as tolerated     [x]  Continue plan of care  []  Update interventions per flow sheet       []  Discharge due to:_  []  Other:_      Donnie Barker PT 1/30/2018  10:01 AM

## 2018-02-01 ENCOUNTER — HOSPITAL ENCOUNTER (OUTPATIENT)
Dept: PHYSICAL THERAPY | Age: 73
Discharge: HOME | End: 2018-02-01
Payer: COMMERCIAL

## 2018-02-01 PROCEDURE — 97110 THERAPEUTIC EXERCISES: CPT | Performed by: PHYSICAL THERAPIST

## 2018-02-01 PROCEDURE — 97140 MANUAL THERAPY 1/> REGIONS: CPT | Performed by: PHYSICAL THERAPIST

## 2018-02-01 NOTE — PROGRESS NOTES
PT DAILY TREATMENT NOTE 2-15    Patient Name: Whitney Stevenson  Date:2018  : 1945  [x]  Patient  Verified  Payor: Jack Chi / Plan: Merlin Reel / Product Type: PPO /    In time: 8:00 AM  Out time: 9:05 AM  Total Treatment Time (min):  65 (45 timed)  Visit #: 6    Treatment Area: Pain in right knee [M25.561]    SUBJECTIVE  Pain Level (0-10 scale): 3-/10  Any medication changes, allergies to medications, adverse drug reactions, diagnosis change, or new procedure performed?: [x] No    [] Yes (see summary sheet for update)  Subjective functional status/changes:   [] No changes reported  Pt reports \"I finally feel like I am making some progress\".  She states less pain at night and she has noticed that when she works from home she goes up/down the stairs more than if she is at work and this is more bothersome    OBJECTIVE    Modality rationale: decrease inflammation, decrease pain and increase tissue extensibility to improve the patients ability to perform ADL's   Min Type Additional Details    [] Estim: []Att   []Unatt        []TENS instruct                  []IFC  []Premod   []NMES                     []Other:  []w/US   []w/ice   []w/heat  Position:  Location:    []  Traction: [] Cervical       []Lumbar                       [] Prone          []Supine                       []Intermittent   []Continuous Lbs:  [] before manual  [] after manual  []w/heat    []  Ultrasound: []Continuous   [] Pulsed at:                            []1MHz   []3MHz Location:  W/cm2:    []  Paraffin         Location:  []w/heat   Heat 10  Ice 10 [x]  Ice- post     []  Heat- pre  []  Ice massage Position:  Location:    []  Laser  []  Other: Position:  Location:    []  Vasopneumatic Device Pressure:       [] lo [] med [] hi   Temperature:    [x] Skin assessment post-treatment:  [x]intact []redness- no adverse reaction    []redness - adverse reaction:     35 min Therapeutic Exercise:  [x] See flow sheet :   Rationale: increase ROM, increase strength and improve balance to improve the patients ability to perform ADL's    -- min Therapeutic Activity:  [x]  See flow sheet :    Rationale: increase ROM and increase strength  to improve the patients ability to walk up/down stairs, transfer in/out of car    10 min Manual Therapy:  Roller over R gastroc lateral>medial  STM/cross-friction massage over R gastroc  IASTM using \"boomerang\" tool, feathering technique used over lateral>medial gastroc, distal HS   Rationale: decrease pain, increase ROM and increase tissue extensibility  to improve the patients ability to ambulate          With   [] TE   [] TA   [] neuro   [] other: Patient Education: [x] Review HEP    [] Progressed/Changed HEP based on:   [] positioning   [] body mechanics   [] transfers   [] heat/ice application    [] other:      Other Objective/Functional Measures: n/a     Pain Level (0-10 scale) post treatment: 0/10    ASSESSMENT/Changes in Function:   Decreased TTP over gastroc today compared to past visits. Challenged with hip strengthening exercises. Progress with strength as tolerated. Patient will continue to benefit from skilled PT services to modify and progress therapeutic interventions, address functional mobility deficits, address ROM deficits, address strength deficits, analyze and address soft tissue restrictions, analyze and cue movement patterns and analyze and modify body mechanics/ergonomics to attain remaining goals. []  See Plan of Care  []  See progress note/recertification  []  See Discharge Summary         Progress towards goals / Updated goals:  Short Term Goals: To be accomplished in 2-3 weeks:  1) Pt will be independent in initial HEP  2) Pt will report > or =25% decrease in exacerbation of symptoms     Long Term Goals:  To be accomplished in 4-6 weeks:  1) Pt will report being able to ambulate for > or =30 min with minimal to no pain in order to grocery shop  2) Pt will perform unilateral R HR > or =50% in to aid in performance of ADL's  3) Pt will improve FOTO score to > or = 42/100 in order to demonstrate improvement in functional mobility    PLAN  []  Upgrade activities as tolerated     [x]  Continue plan of care  []  Update interventions per flow sheet       []  Discharge due to:_  []  Other:_      Negrito Casiano PT 2/1/2018  8:30 AM

## 2018-02-06 ENCOUNTER — HOSPITAL ENCOUNTER (OUTPATIENT)
Dept: PHYSICAL THERAPY | Age: 73
Discharge: HOME | End: 2018-02-06
Payer: COMMERCIAL

## 2018-02-06 PROCEDURE — 97140 MANUAL THERAPY 1/> REGIONS: CPT | Performed by: PHYSICAL THERAPIST

## 2018-02-06 PROCEDURE — 97110 THERAPEUTIC EXERCISES: CPT | Performed by: PHYSICAL THERAPIST

## 2018-02-06 NOTE — PROGRESS NOTES
PT DAILY TREATMENT NOTE 2-15    Patient Name: Lesvia Weathers  Date:2018  : 1945  [x]  Patient  Verified  Payor: Jolynn Conner / Plan: Pastor Aparicio / Product Type: PPO /    In time: 8:05 AM  Out time:  9:15 AM  Total Treatment Time (min):  70 (50 timed)  Visit #: 7    Treatment Area: Pain in right knee [M25.561]    SUBJECTIVE  Pain Level (0-10 scale): not reported  Any medication changes, allergies to medications, adverse drug reactions, diagnosis change, or new procedure performed?: [x] No    [] Yes (see summary sheet for update)  Subjective functional status/changes:   [] No changes reported  Pt states that she drove 3 hours over the weekend and had no pain-- \"I thought I was cured! But then it really acted up that night\".  She states improvement since beginning therapy- she states increased pain along lateral aspect of lower leg more so than her calf  \"someone told me the other day at work that I am walking better\"    OBJECTIVE    Modality rationale: decrease inflammation, decrease pain and increase tissue extensibility to improve the patients ability to perform ADL's   Min Type Additional Details    [] Estim: []Att   []Unatt        []TENS instruct                  []IFC  []Premod   []NMES                     []Other:  []w/US   []w/ice   []w/heat  Position:  Location:    []  Traction: [] Cervical       []Lumbar                       [] Prone          []Supine                       []Intermittent   []Continuous Lbs:  [] before manual  [] after manual  []w/heat    []  Ultrasound: []Continuous   [] Pulsed at:                            []1MHz   []3MHz Location:  W/cm2:    []  Paraffin         Location:  []w/heat   Heat 10  Ice 10 [x]  Ice- post     []  Heat- pre  []  Ice massage Position:  Location:    []  Laser  []  Other: Position:  Location:    []  Vasopneumatic Device Pressure:       [] lo [] med [] hi   Temperature:    [x] Skin assessment post-treatment:  [x]intact []redness- no adverse reaction    []redness - adverse reaction:     40 min Therapeutic Exercise:  [x] See flow sheet :   Rationale: increase ROM, increase strength and improve balance to improve the patients ability to perform ADL's    -- min Therapeutic Activity:  [x]  See flow sheet :    Rationale: increase ROM and increase strength  to improve the patients ability to walk up/down stairs, transfer in/out of car    10 min Manual Therapy:    STM/cross-friction massage over R gastroc   IASTM using \"boomerang\" tool, feathering technique used over lateral>medial gastroc, distal HS, R achilles tendon, fibularis longus/brevis   Rationale: decrease pain, increase ROM and increase tissue extensibility  to improve the patients ability to ambulate          With   [] TE   [] TA   [] neuro   [] other: Patient Education: [x] Review HEP    [] Progressed/Changed HEP based on:   [] positioning   [] body mechanics   [] transfers   [] heat/ice application    [] other:      Other Objective/Functional Measures: n/a     Pain Level (0-10 scale) post treatment: 0/10    ASSESSMENT/Changes in Function:   Pt progressing with strengthening exercises; tolerated IASTM well. Continues to be challenged with balance exercises. Patient will continue to benefit from skilled PT services to modify and progress therapeutic interventions, address functional mobility deficits, address ROM deficits, address strength deficits, analyze and address soft tissue restrictions, analyze and cue movement patterns and analyze and modify body mechanics/ergonomics to attain remaining goals. []  See Plan of Care  []  See progress note/recertification  []  See Discharge Summary         Progress towards goals / Updated goals:  Short Term Goals: To be accomplished in 2-3 weeks:  1) Pt will be independent in initial HEP  2) Pt will report > or =25% decrease in exacerbation of symptoms     Long Term Goals:  To be accomplished in 4-6 weeks:  1) Pt will report being able to ambulate for > or =30 min with minimal to no pain in order to grocery shop  2) Pt will perform unilateral R HR > or =50% in to aid in performance of ADL's  3) Pt will improve FOTO score to > or = 42/100 in order to demonstrate improvement in functional mobility    PLAN  []  Upgrade activities as tolerated     [x]  Continue plan of care  []  Update interventions per flow sheet       []  Discharge due to:_  []  Other:_      Donnie Barker, PT 2/6/2018  8:45 AM

## 2018-02-08 ENCOUNTER — HOSPITAL ENCOUNTER (OUTPATIENT)
Dept: PHYSICAL THERAPY | Age: 73
Discharge: HOME | End: 2018-02-08
Payer: COMMERCIAL

## 2018-02-08 PROCEDURE — 97140 MANUAL THERAPY 1/> REGIONS: CPT | Performed by: PHYSICAL THERAPIST

## 2018-02-08 PROCEDURE — 97110 THERAPEUTIC EXERCISES: CPT | Performed by: PHYSICAL THERAPIST

## 2018-02-08 NOTE — PROGRESS NOTES
PT DAILY TREATMENT NOTE 2-15    Patient Name: Master James  Date:2018  : 1945  [x]  Patient  Verified  Payor: Subha Bray / Plan: Tiffany Carballo / Product Type: PPO /    In time: 8:05 AM  Out time:  9:15 AM  Total Treatment Time (min):  70 (50 timed)  Visit #: 8    Treatment Area: Pain in right knee [M25.561]    SUBJECTIVE  Pain Level (0-10 scale): 4/10  Any medication changes, allergies to medications, adverse drug reactions, diagnosis change, or new procedure performed?: [x] No    [] Yes (see summary sheet for update)  Subjective functional status/changes:   [] No changes reported  Pt reports being sore after last visit- \"maybe that new ankle exercise on that board?  I really felt that one\"  She states she had soreness down outside of lower leg into bottom of foot    OBJECTIVE    Modality rationale: decrease inflammation, decrease pain and increase tissue extensibility to improve the patients ability to perform ADL's   Min Type Additional Details    [] Estim: []Att   []Unatt        []TENS instruct                  []IFC  []Premod   []NMES                     []Other:  []w/US   []w/ice   []w/heat  Position:  Location:    []  Traction: [] Cervical       []Lumbar                       [] Prone          []Supine                       []Intermittent   []Continuous Lbs:  [] before manual  [] after manual  []w/heat    []  Ultrasound: []Continuous   [] Pulsed at:                            []1MHz   []3MHz Location:  W/cm2:    []  Paraffin         Location:  []w/heat   Heat 10  Ice 10 [x]  Ice- post     []  Heat- pre  []  Ice massage Position:  Location:    []  Laser  []  Other: Position:  Location:    []  Vasopneumatic Device Pressure:       [] lo [] med [] hi   Temperature:    [x] Skin assessment post-treatment:  [x]intact []redness- no adverse reaction    []redness - adverse reaction:     40 min Therapeutic Exercise:  [x] See flow sheet :   Rationale: increase ROM, increase strength and improve balance to improve the patients ability to perform ADL's    -- min Therapeutic Activity:  [x]  See flow sheet :    Rationale: increase ROM and increase strength  to improve the patients ability to walk up/down stairs, transfer in/out of car    10 min Manual Therapy:    STM/cross-friction massage over R gastroc   IASTM using \"boomerang\" tool, feathering technique used over lateral>medial gastroc, distal HS, R achilles tendon, fibularis longus/brevis   Rationale: decrease pain, increase ROM and increase tissue extensibility  to improve the patients ability to ambulate          With   [] TE   [] TA   [] neuro   [] other: Patient Education: [x] Review HEP    [] Progressed/Changed HEP based on:   [] positioning   [] body mechanics   [] transfers   [] heat/ice application    [] other:      Other Objective/Functional Measures: n/a     Pain Level (0-10 scale) post treatment: 2/10    ASSESSMENT/Changes in Function:   Decreased BAPS board to Level 1. Added ankle 4 way to HEP. Pt overall tolerated therapy session well-- demonstrates mod TTP fibularis longus/brevis     Patient will continue to benefit from skilled PT services to modify and progress therapeutic interventions, address functional mobility deficits, address ROM deficits, address strength deficits, analyze and address soft tissue restrictions, analyze and cue movement patterns and analyze and modify body mechanics/ergonomics to attain remaining goals. []  See Plan of Care  []  See progress note/recertification  []  See Discharge Summary         Progress towards goals / Updated goals:  Short Term Goals: To be accomplished in 2-3 weeks:  1) Pt will be independent in initial HEP  2) Pt will report > or =25% decrease in exacerbation of symptoms     Long Term Goals:  To be accomplished in 4-6 weeks:  1) Pt will report being able to ambulate for > or =30 min with minimal to no pain in order to grocery shop  2) Pt will perform unilateral R HR > or =50% in to aid in performance of ADL's  3) Pt will improve FOTO score to > or = 42/100 in order to demonstrate improvement in functional mobility    PLAN  []  Upgrade activities as tolerated     [x]  Continue plan of care  []  Update interventions per flow sheet       []  Discharge due to:_  []  Other:_      Bola Leos, PT 2/8/2018  8:12 AM

## 2018-02-13 ENCOUNTER — HOSPITAL ENCOUNTER (OUTPATIENT)
Dept: PHYSICAL THERAPY | Age: 73
Discharge: HOME | End: 2018-02-13
Payer: COMMERCIAL

## 2018-02-13 PROCEDURE — 97140 MANUAL THERAPY 1/> REGIONS: CPT | Performed by: PHYSICAL THERAPIST

## 2018-02-13 PROCEDURE — 97110 THERAPEUTIC EXERCISES: CPT | Performed by: PHYSICAL THERAPIST

## 2018-02-13 NOTE — PROGRESS NOTES
PT DAILY TREATMENT NOTE 2-15    Patient Name: Whitney Stevenson  Date:2018  : 1945  [x]  Patient  Verified  Payor: Jack Chi / Plan: Merlin Reel / Product Type: PPO /    In time: 8:35 AM  Out time: 9:45 AM  Total Treatment Time (min):  70 (50 timed)  Visit #: 9    Treatment Area: Pain in right knee [M25.561]    SUBJECTIVE  Pain Level (0-10 scale): 2/10  Any medication changes, allergies to medications, adverse drug reactions, diagnosis change, or new procedure performed?: [x] No    [] Yes (see summary sheet for update)  Subjective functional status/changes:   [] No changes reported  Pt had a family emergency yesterday and she had to drive 2 hours there and 2 hours home- \"I wanted to cut my leg off it was so bad\".  She used heat and ice when she got home and today it feels much better  \"My legs are definitely stronger\"    OBJECTIVE    Modality rationale: decrease inflammation, decrease pain and increase tissue extensibility to improve the patients ability to perform ADL's   Min Type Additional Details    [] Estim: []Att   []Unatt        []TENS instruct                  []IFC  []Premod   []NMES                     []Other:  []w/US   []w/ice   []w/heat  Position:  Location:    []  Traction: [] Cervical       []Lumbar                       [] Prone          []Supine                       []Intermittent   []Continuous Lbs:  [] before manual  [] after manual  []w/heat    []  Ultrasound: []Continuous   [] Pulsed at:                            []1MHz   []3MHz Location:  W/cm2:    []  Paraffin         Location:  []w/heat   Heat 10  Ice 10 [x]  Ice- post     []  Heat- pre  []  Ice massage Position:  Location:    []  Laser  []  Other: Position:  Location:    []  Vasopneumatic Device Pressure:       [] lo [] med [] hi   Temperature:    [x] Skin assessment post-treatment:  [x]intact []redness- no adverse reaction    []redness - adverse reaction:     40 min Therapeutic Exercise:  [x] See flow sheet :   Rationale: increase ROM, increase strength and improve balance to improve the patients ability to perform ADL's    -- min Therapeutic Activity:  [x]  See flow sheet :    Rationale: increase ROM and increase strength  to improve the patients ability to walk up/down stairs, transfer in/out of car    10 min Manual Therapy:    STM/cross-friction massage over R gastroc   IASTM using \"boomerang\" tool, feathering technique used over lateral>medial gastroc, distal HS, R achilles tendon, fibularis longus/brevis   Rationale: decrease pain, increase ROM and increase tissue extensibility  to improve the patients ability to ambulate          With   [] TE   [] TA   [] neuro   [] other: Patient Education: [x] Review HEP    [] Progressed/Changed HEP based on:   [] positioning   [] body mechanics   [] transfers   [] heat/ice application    [] other:      Other Objective/Functional Measures: n/a     Pain Level (0-10 scale) post treatment: 2/10    ASSESSMENT/Changes in Function:    Pt continuing to demonstrate global tenderness over fibularis longus/brevis, gastroc and distal HS. Encouraged her to continue to ice and stretching and spoke with her regarding realistic prognosis for muscular strains. Patient will continue to benefit from skilled PT services to modify and progress therapeutic interventions, address functional mobility deficits, address ROM deficits, address strength deficits, analyze and address soft tissue restrictions, analyze and cue movement patterns and analyze and modify body mechanics/ergonomics to attain remaining goals. []  See Plan of Care  []  See progress note/recertification  []  See Discharge Summary         Progress towards goals / Updated goals:  Short Term Goals: To be accomplished in 2-3 weeks:  1) Pt will be independent in initial HEP  2) Pt will report > or =25% decrease in exacerbation of symptoms     Long Term Goals:  To be accomplished in 4-6 weeks:  1) Pt will report being able to ambulate for > or =30 min with minimal to no pain in order to grocery shop  2) Pt will perform unilateral R HR > or =50% in to aid in performance of ADL's  3) Pt will improve FOTO score to > or = 42/100 in order to demonstrate improvement in functional mobility    PLAN  []  Upgrade activities as tolerated     [x]  Continue plan of care  []  Update interventions per flow sheet       []  Discharge due to:_  []  Other:_      Lizzie Asif PT 2/13/2018  9:14 AM

## 2018-02-15 ENCOUNTER — HOSPITAL ENCOUNTER (OUTPATIENT)
Dept: PHYSICAL THERAPY | Age: 73
Discharge: HOME | End: 2018-02-15
Payer: COMMERCIAL

## 2018-02-15 PROCEDURE — 97110 THERAPEUTIC EXERCISES: CPT | Performed by: PHYSICAL THERAPIST

## 2018-02-15 PROCEDURE — 97140 MANUAL THERAPY 1/> REGIONS: CPT | Performed by: PHYSICAL THERAPIST

## 2018-02-15 NOTE — PROGRESS NOTES
PT DAILY TREATMENT NOTE 2-15    Patient Name: Domingo Miller  Date:2/15/2018  : 1945  [x]  Patient  Verified  Payor: Ghulam Holley / Plan: Sussy Duckworth / Product Type: PPO /    In time: 7:30 AM  Out time: 8:40 AM  Total Treatment Time (min):  70 (50 timed)  Visit #: 10    Treatment Area: Pain in right knee [M25.561]    SUBJECTIVE  Pain Level (0-10 scale): 2/10  Any medication changes, allergies to medications, adverse drug reactions, diagnosis change, or new procedure performed?: [x] No    [] Yes (see summary sheet for update)  Subjective functional status/changes:   [] No changes reported  \"I'm encouraged, I am feeling better\" She states she is having less \"intense\"pain    OBJECTIVE    Modality rationale: decrease inflammation, decrease pain and increase tissue extensibility to improve the patients ability to perform ADL's   Min Type Additional Details    [] Estim: []Att   []Unatt        []TENS instruct                  []IFC  []Premod   []NMES                     []Other:  []w/US   []w/ice   []w/heat  Position:  Location:    []  Traction: [] Cervical       []Lumbar                       [] Prone          []Supine                       []Intermittent   []Continuous Lbs:  [] before manual  [] after manual  []w/heat    []  Ultrasound: []Continuous   [] Pulsed at:                            []1MHz   []3MHz Location:  W/cm2:    []  Paraffin         Location:  []w/heat   Heat 10  Ice 10 [x]  Ice- post     []  Heat- pre  []  Ice massage Position:  Location:    []  Laser  []  Other: Position:  Location:    []  Vasopneumatic Device Pressure:       [] lo [] med [] hi   Temperature:    [x] Skin assessment post-treatment:  [x]intact []redness- no adverse reaction    []redness - adverse reaction:     40 min Therapeutic Exercise:  [x] See flow sheet :   Rationale: increase ROM, increase strength and improve balance to improve the patients ability to perform ADL's    -- min Therapeutic Activity:  [x] See flow sheet :    Rationale: increase ROM and increase strength  to improve the patients ability to walk up/down stairs, transfer in/out of car    10 min Manual Therapy:    STM/cross-friction massage over R gastroc   IASTM using \"boomerang\" tool, feathering technique used over lateral>medial gastroc, distal HS, R achilles tendon, fibularis longus/brevis   Rationale: decrease pain, increase ROM and increase tissue extensibility  to improve the patients ability to ambulate          With   [] TE   [] TA   [] neuro   [] other: Patient Education: [x] Review HEP    [] Progressed/Changed HEP based on:   [] positioning   [] body mechanics   [] transfers   [] heat/ice application    [] other:      Other Objective/Functional Measures: n/a     Pain Level (0-10 scale) post treatment: 1/10    ASSESSMENT/Changes in Function:    Pt demonstrates pain during push-off phase of gait cycle. Overall tolerated therapy session well; progressing with strengthening. Patient will continue to benefit from skilled PT services to modify and progress therapeutic interventions, address functional mobility deficits, address ROM deficits, address strength deficits, analyze and address soft tissue restrictions, analyze and cue movement patterns and analyze and modify body mechanics/ergonomics to attain remaining goals. []  See Plan of Care  []  See progress note/recertification  []  See Discharge Summary         Progress towards goals / Updated goals:  Short Term Goals: To be accomplished in 2-3 weeks:  1) Pt will be independent in initial HEP  2) Pt will report > or =25% decrease in exacerbation of symptoms     Long Term Goals:  To be accomplished in 4-6 weeks:  1) Pt will report being able to ambulate for > or =30 min with minimal to no pain in order to grocery shop  2) Pt will perform unilateral R HR > or =50% in to aid in performance of ADL's  3) Pt will improve FOTO score to > or = 42/100 in order to demonstrate improvement in functional mobility    PLAN  []  Upgrade activities as tolerated     [x]  Continue plan of care  []  Update interventions per flow sheet       []  Discharge due to:_  []  Other:_      Jameson Weaver PT 2/15/2018  8:12 AM

## 2018-02-18 DIAGNOSIS — I10 ESSENTIAL HYPERTENSION, BENIGN: ICD-10-CM

## 2018-02-18 RX ORDER — HYDROCHLOROTHIAZIDE 25 MG/1
TABLET ORAL
Qty: 90 TAB | Refills: 0 | Status: SHIPPED | OUTPATIENT
Start: 2018-02-18 | End: 2018-05-19 | Stop reason: SDUPTHER

## 2018-02-20 ENCOUNTER — HOSPITAL ENCOUNTER (OUTPATIENT)
Dept: PHYSICAL THERAPY | Age: 73
Discharge: HOME | End: 2018-02-20
Payer: COMMERCIAL

## 2018-02-20 PROCEDURE — 97140 MANUAL THERAPY 1/> REGIONS: CPT | Performed by: PHYSICAL THERAPIST

## 2018-02-20 PROCEDURE — 97110 THERAPEUTIC EXERCISES: CPT | Performed by: PHYSICAL THERAPIST

## 2018-02-20 NOTE — PROGRESS NOTES
PT DAILY TREATMENT NOTE 2-15    Patient Name: Soha Whelan  Date:2018  : 1945  [x]  Patient  Verified  Payor: Sergei Crandall / Plan: Adia Champagne / Product Type: PPO /    In time: 8:35 AM  Out time: 10:00 AM  Total Treatment Time (min):  85 (65 timed)  Visit #: 11    Treatment Area: Pain in right knee [M25.561]    SUBJECTIVE  Pain Level (0-10 scale): 3/10  Any medication changes, allergies to medications, adverse drug reactions, diagnosis change, or new procedure performed?: [x] No    [] Yes (see summary sheet for update)  Subjective functional status/changes:   [] No changes reported  She states \"maybe I am doing something over the weekend, because I felt good at the end of last week and then it was bothering me yesterday\" She does clean over the weekends and goes up/down the stairs more.     OBJECTIVE    Modality rationale: decrease inflammation, decrease pain and increase tissue extensibility to improve the patients ability to perform ADL's   Min Type Additional Details    [] Estim: []Att   []Unatt        []TENS instruct                  []IFC  []Premod   []NMES                     []Other:  []w/US   []w/ice   []w/heat  Position:  Location:    []  Traction: [] Cervical       []Lumbar                       [] Prone          []Supine                       []Intermittent   []Continuous Lbs:  [] before manual  [] after manual  []w/heat    []  Ultrasound: []Continuous   [] Pulsed at:                            []1MHz   []3MHz Location:  W/cm2:    []  Paraffin         Location:  []w/heat   Heat 10  Ice 10 [x]  Ice- post     []  Heat- pre  []  Ice massage Position:  Location:    []  Laser  []  Other: Position:  Location:    []  Vasopneumatic Device Pressure:       [] lo [] med [] hi   Temperature:    [x] Skin assessment post-treatment:  [x]intact []redness- no adverse reaction    []redness - adverse reaction:     40 min Therapeutic Exercise:  [x] See flow sheet :   Rationale: increase ROM, increase strength and improve balance to improve the patients ability to perform ADL's    -- min Therapeutic Activity:  [x]  See flow sheet :    Rationale: increase ROM and increase strength  to improve the patients ability to walk up/down stairs, transfer in/out of car    25 min Manual Therapy:    STM/cross-friction massage over R gastroc   IASTM using \"boomerang\" tool, feathering technique used over lateral>medial gastroc, distal HS, R achilles tendon, fibularis longus/brevis  Roller over R ITB, lateral HS   Rationale: decrease pain, increase ROM and increase tissue extensibility  to improve the patients ability to ambulate          With   [] TE   [] TA   [] neuro   [] other: Patient Education: [x] Review HEP    [] Progressed/Changed HEP based on:   [] positioning   [] body mechanics   [] transfers   [] heat/ice application    [] other:      Other Objective/Functional Measures: n/a     Pain Level (0-10 scale) post treatment: 0/10    ASSESSMENT/Changes in Function:    Encouraged pt to avoid toe out gait pattern on R during ambulation. Tolerated therapy session well, progressed strengthening. Decreased pain levels after manual therapy. Patient will continue to benefit from skilled PT services to modify and progress therapeutic interventions, address functional mobility deficits, address ROM deficits, address strength deficits, analyze and address soft tissue restrictions, analyze and cue movement patterns and analyze and modify body mechanics/ergonomics to attain remaining goals. []  See Plan of Care  []  See progress note/recertification  []  See Discharge Summary         Progress towards goals / Updated goals:  Short Term Goals: To be accomplished in 2-3 weeks:  1) Pt will be independent in initial HEP  2) Pt will report > or =25% decrease in exacerbation of symptoms     Long Term Goals:  To be accomplished in 4-6 weeks:  1) Pt will report being able to ambulate for > or =30 min with minimal to no pain in order to grocery shop  2) Pt will perform unilateral R HR > or =50% in to aid in performance of ADL's  3) Pt will improve FOTO score to > or = 42/100 in order to demonstrate improvement in functional mobility    PLAN  []  Upgrade activities as tolerated     [x]  Continue plan of care  []  Update interventions per flow sheet       []  Discharge due to:_  []  Other:_      Miguel Ángel Born, PT 2/20/2018  9:39 AM

## 2018-02-22 ENCOUNTER — HOSPITAL ENCOUNTER (OUTPATIENT)
Dept: PHYSICAL THERAPY | Age: 73
Discharge: HOME | End: 2018-02-22
Payer: COMMERCIAL

## 2018-02-22 PROCEDURE — 97140 MANUAL THERAPY 1/> REGIONS: CPT | Performed by: PHYSICAL THERAPIST

## 2018-02-22 PROCEDURE — 97110 THERAPEUTIC EXERCISES: CPT | Performed by: PHYSICAL THERAPIST

## 2018-02-22 NOTE — PROGRESS NOTES
PT DAILY TREATMENT/Progress NOTE 2-15    Patient Name: Rosey Guan  Date:2018  : 1945  [x]  Patient  Verified  Payor: Jeanne Stevenson / Plan: Adelso Allred / Product Type: PPO /    In time: 8:00 AM  Out time: 9:00 AM  Total Treatment Time (min):  60 (40 timed)  Visit #: 12    Treatment Area: Pain in right knee [M25.561]    SUBJECTIVE  Pain Level (0-10 scale): 3/10  Any medication changes, allergies to medications, adverse drug reactions, diagnosis change, or new procedure performed?: [x] No    [] Yes (see summary sheet for update)  Subjective functional status/changes:   [] No changes reported  She states \"maybe I am doing something over the weekend, because I felt good at the end of last week and then it was bothering me yesterday\" She does clean over the weekends and goes up/down the stairs more.     OBJECTIVE    Modality rationale: decrease inflammation, decrease pain and increase tissue extensibility to improve the patients ability to perform ADL's   Min Type Additional Details    [] Estim: []Att   []Unatt        []TENS instruct                  []IFC  []Premod   []NMES                     []Other:  []w/US   []w/ice   []w/heat  Position:  Location:    []  Traction: [] Cervical       []Lumbar                       [] Prone          []Supine                       []Intermittent   []Continuous Lbs:  [] before manual  [] after manual  []w/heat    []  Ultrasound: []Continuous   [] Pulsed at:                            []1MHz   []3MHz Location:  W/cm2:    []  Paraffin         Location:  []w/heat   Heat 10  Ice 10 [x]  Ice- post     []  Heat- pre  []  Ice massage Position:  Location:    []  Laser  []  Other: Position:  Location:    []  Vasopneumatic Device Pressure:       [] lo [] med [] hi   Temperature:    [x] Skin assessment post-treatment:  [x]intact []redness- no adverse reaction    []redness - adverse reaction:     30 min Therapeutic Exercise:  [x] See flow sheet : Reassessment performed   Rationale: increase ROM, increase strength and improve balance to improve the patients ability to perform ADL's    -- min Therapeutic Activity:  [x]  See flow sheet :    Rationale: increase ROM and increase strength  to improve the patients ability to walk up/down stairs, transfer in/out of car    10 min Manual Therapy:    STM/cross-friction massage over R gastroc, R lateral>medial HS  IASTM using \"boomerang\" tool, feathering technique used over lateral>medial gastroc, mid and distal HS, R achilles tendon, fibularis longus/brevis   Rationale: decrease pain, increase ROM and increase tissue extensibility  to improve the patients ability to ambulate          With   [] TE   [] TA   [] neuro   [] other: Patient Education: [x] Review HEP    [] Progressed/Changed HEP based on:   [] positioning   [] body mechanics   [] transfers   [] heat/ice application    [] other:      Other Objective/Functional Measures:   Strength (1-5)                      Right Left Comments   Hip flexion 4 4     Knee ext 4+ 4+     Knee flex 4- 4+ P! On R   Ankle DF 5 5     P! With R prone HS strength testing. 4-/5 on R          Palpation:  Min to mod TTP R gastroc muscle belly and lateral>medial head, soleus, achilles tendon  Mod TTP R mid and distal R HS       Pain Level (0-10 scale) post treatment: \"better\"    ASSESSMENT/Changes in Function:    Pt has completed 12 skilled PT visits. She continues to demonstrate pain with walking up/down stairs, getting in/out of car. She demonstrates improvement in gait mechanics and LE strength. She continues to demonstrate pain with HS strength testing. Strongly encouraged pt to be performing HS and calf stretches 2-3x/day. Will continue PT 2x/wk for additional 4-6 weeks for continued strengthening and to address functional impairments.       Patient will continue to benefit from skilled PT services to modify and progress therapeutic interventions, address functional mobility deficits, address ROM deficits, address strength deficits, analyze and address soft tissue restrictions, analyze and cue movement patterns and analyze and modify body mechanics/ergonomics to attain remaining goals. []  See Plan of Care  []  See progress note/recertification  []  See Discharge Summary         Progress towards goals / Updated goals:  Short Term Goals: To be accomplished in 2-3 weeks:  1) Pt will be independent in initial HEP MET  2) Pt will report > or =25% decrease in exacerbation of symptoms     Long Term Goals:  To be accomplished in 4-6 weeks:  1) Pt will report being able to ambulate for > or =30 min with minimal to no pain in order to grocery shop  2) Pt will perform unilateral R HR > or =50% in to aid in performance of ADL's  3) Pt will improve FOTO score to > or = 42/100 in order to demonstrate improvement in functional mobility    PLAN  []  Upgrade activities as tolerated     [x]  Continue plan of care  []  Update interventions per flow sheet       []  Discharge due to:_  []  Other:_      Arbutus Fothergill, PT 2/22/2018  8:04 AM

## 2018-02-26 ENCOUNTER — OFFICE VISIT (OUTPATIENT)
Dept: INTERNAL MEDICINE CLINIC | Age: 73
End: 2018-02-26

## 2018-02-26 VITALS
WEIGHT: 238 LBS | DIASTOLIC BLOOD PRESSURE: 102 MMHG | OXYGEN SATURATION: 96 % | TEMPERATURE: 98.1 F | BODY MASS INDEX: 42.17 KG/M2 | SYSTOLIC BLOOD PRESSURE: 164 MMHG | HEIGHT: 63 IN | HEART RATE: 92 BPM | RESPIRATION RATE: 12 BRPM

## 2018-02-26 DIAGNOSIS — R30.0 DYSURIA: ICD-10-CM

## 2018-02-26 DIAGNOSIS — I10 ESSENTIAL HYPERTENSION, BENIGN: ICD-10-CM

## 2018-02-26 DIAGNOSIS — N30.00 ACUTE CYSTITIS WITHOUT HEMATURIA: Primary | ICD-10-CM

## 2018-02-26 DIAGNOSIS — E66.01 OBESITY, MORBID (HCC): ICD-10-CM

## 2018-02-26 LAB
BILIRUB UR QL STRIP: NEGATIVE
GLUCOSE UR-MCNC: NEGATIVE MG/DL
KETONES P FAST UR STRIP-MCNC: NEGATIVE MG/DL
PH UR STRIP: 6 [PH] (ref 4.6–8)
PROT UR QL STRIP: NORMAL
SP GR UR STRIP: 1.02 (ref 1–1.03)
UA UROBILINOGEN AMB POC: NORMAL (ref 0.2–1)
URINALYSIS CLARITY POC: NORMAL
URINALYSIS COLOR POC: YELLOW
URINE BLOOD POC: NORMAL
URINE LEUKOCYTES POC: NORMAL
URINE NITRITES POC: POSITIVE

## 2018-02-26 RX ORDER — SULFAMETHOXAZOLE AND TRIMETHOPRIM 800; 160 MG/1; MG/1
1 TABLET ORAL 2 TIMES DAILY
Qty: 10 TAB | Refills: 0 | Status: SHIPPED | OUTPATIENT
Start: 2018-02-26 | End: 2018-03-03

## 2018-02-26 NOTE — PROGRESS NOTES
Mila Corbett is a 71 y.o. female who was seen in clinic today (2/26/2018). Assessment & Plan:   Diagnoses and all orders for this visit:    1. Acute cystitis without hematuria- new dx to me, recurrent issue, UA abnormal (reviewed could be due to h/o pyridium use but stopped yesterday). Will tx w/ meds below and check UC. -     trimethoprim-sulfamethoxazole (BACTRIM DS, SEPTRA DS) 160-800 mg per tablet; Take 1 Tab by mouth two (2) times a day for 5 days. -     CULTURE, URINE    2. Dysuria  -     AMB POC URINALYSIS DIP STICK AUTO W/O MICRO    3. Essential hypertension, benign- previously at goal/borderline, uncontrolled now, likely related to #1, encouraged to check amb BP and RTC if does not improve    4. Obesity, morbid ([address]- stable, needs improvement, I have reviewed/discussed the above normal BMI with the patient. I have recommended the following interventions: encourage exercise and lifestyle education regarding diet. Follow-up Disposition:  Return in about 4 months (around 6/26/2018) for FULL PHYSICAL - 30 minutes. Subjective: Jimbo [place] was seen today for Dysuria    Urinary Chelly Sierra returns to clinic today to discuss dysuria, frequency, nocturia, suprapubic pressure for 3 days. This is a recurrent. She denies urgency, nausea, vomiting, diarrhea, constipation, hematuria, flank pain. She reports symptoms had improved (did not take Azo this morning). Patient has tried: increasing PO intake, cranberry pills or juice and pyridium. She reports the following likely etiologies: nothing. Prior to Admission medications    Medication Sig Start Date End Date Taking? Authorizing Provider   hydroCHLOROthiazide (HYDRODIURIL) 25 mg tablet TAKE 1 TABLET BY MOUTH DAILY. , LABS REQUIRED FOR ANY FURTHER REFILLS 2/18/18  Yes Kavya Palacios MD   tiZANidine (ZANAFLEX) 2 mg tablet Take 1 Tab by mouth daily as needed.  1/18/18  Yes Kavya Palacios MD   potassium chloride (KLOR-CON) 10 mEq tablet Take 1 Tab by mouth daily. 1/9/18  Yes Fely Soto MD   [facility]AZolam [facility] 0.25 mg tablet TAKE 1 TABLET BY MOUTH DAILY AS NEEDED FOR ANXIETY 12/26/17  Yes Fely Soto MD   mometasone (NASONEX) 50 mcg/actuation nasal spray 2 Sprays by Both Nostrils route as needed. 5/15/17  Yes Fely Soto MD   red yeast rice 600 mg tab Take 1 Tab by mouth two (2) times a day. 10/22/15  Yes Fely Soto MD   acetaminophen (TYLENOL EXTRA STRENGTH) 500 mg tablet Take 500 mg by mouth as needed for Pain. Yes Historical Provider   lactobacillus rhamnosus gg 10 billion cell (CULTURELLE) 10 billion cell capsule Take 1 capsule by mouth daily. Yes Historical Provider   cholecalciferol, vitamin D3, (VITAMIN D3) 2,000 unit tab Take 1 tablet by mouth daily. Yes Historical Provider   aspirin 81 mg tablet Take 81 mg by mouth daily. Yes Historical Provider          Allergies   Allergen Reactions    Diovan [Valsartan] Other (comments)     headaches    Lisinopril Other (comments)     headaches    Pcn [Penicillins] Rash    Statins-Hmg-Coa Reductase Inhibitors Myalgia           Review of Systems   Constitutional: Negative for chills and fever. Objective:   Physical Exam   Constitutional: No distress. obese   Cardiovascular: Regular rhythm and normal heart sounds. No murmur heard. Pulmonary/Chest: Effort normal and breath sounds normal. She has no wheezes. She has no rales. Abdominal: Bowel sounds are normal. She exhibits no mass. There is no hepatosplenomegaly. There is no tenderness. Visit Vitals    BP (!) 164/102    Pulse 92    Temp 98.1 °F (36.7 °C) (Oral)    Resp 12    Ht 5' 2.8\" (1.595 m)    Wt 238 lb (108 kg)    SpO2 96%    BMI 42.43 kg/m2         Disclaimer:  Advised her to call back or return to office if symptoms worsen/change/persist.  Discussed expected course/resolution/complications of diagnosis in detail with patient.     Medication risks/benefits/costs/interactions/alternatives discussed with patient. She was given an after visit summary which includes diagnoses, current medications, & vitals. She expressed understanding with the diagnosis and plan. Aspects of this note may have been generated using voice recognition software. Despite editing, there may be some syntax errors.        Zamzam Carcamo MD

## 2018-02-27 ENCOUNTER — HOSPITAL ENCOUNTER (OUTPATIENT)
Dept: PHYSICAL THERAPY | Age: 73
Discharge: HOME | End: 2018-02-27
Payer: COMMERCIAL

## 2018-02-27 DIAGNOSIS — M79.604 LEG PAIN, RIGHT: ICD-10-CM

## 2018-02-27 PROCEDURE — 97140 MANUAL THERAPY 1/> REGIONS: CPT | Performed by: PHYSICAL THERAPIST

## 2018-02-27 PROCEDURE — 97110 THERAPEUTIC EXERCISES: CPT | Performed by: PHYSICAL THERAPIST

## 2018-02-27 PROCEDURE — 97530 THERAPEUTIC ACTIVITIES: CPT | Performed by: PHYSICAL THERAPIST

## 2018-02-27 RX ORDER — TIZANIDINE 2 MG/1
TABLET ORAL
Qty: 10 TAB | Refills: 1 | Status: SHIPPED | OUTPATIENT
Start: 2018-02-27 | End: 2018-06-29 | Stop reason: SDUPTHER

## 2018-02-27 NOTE — PROGRESS NOTES
PT DAILY TREATMENT NOTE 2-15    Patient Name: Shweta Boone  Date:2018  : 1945  [x]  Patient  Verified  Payor: Yareli Whitman / Plan: Pam Katz / Product Type: PPO /    In time: 8:45 AM  Out time: 10:10 AM  Total Treatment Time (min):  85 (65 timed)  Visit #: 13    Treatment Area: Pain in right knee [M25.561]    SUBJECTIVE  Pain Level (0-10 scale): 310  Any medication changes, allergies to medications, adverse drug reactions, diagnosis change, or new procedure performed?: [x] No    [] Yes (see summary sheet for update)  Subjective functional status/changes:   [] No changes reported  Pt saw Dr. Pallavi Valdivia yesterday, she states he wants her to continue with PT for another \"month or so\" and then to follow up with him if necessary.  She is doing okay today, she states continued pain with getting in/out of car    OBJECTIVE    Modality rationale: decrease inflammation, decrease pain and increase tissue extensibility to improve the patients ability to perform ADL's   Min Type Additional Details    [] Estim: []Att   []Unatt        []TENS instruct                  []IFC  []Premod   []NMES                     []Other:  []w/US   []w/ice   []w/heat  Position:  Location:    []  Traction: [] Cervical       []Lumbar                       [] Prone          []Supine                       []Intermittent   []Continuous Lbs:  [] before manual  [] after manual  []w/heat    []  Ultrasound: []Continuous   [] Pulsed at:                            []1MHz   []3MHz Location:  W/cm2:    []  Paraffin         Location:  []w/heat   Heat 10  Ice 10 [x]  Ice- post     []  Heat- pre  []  Ice massage Position:  Location:    []  Laser  []  Other: Position:  Location:    []  Vasopneumatic Device Pressure:       [] lo [] med [] hi   Temperature:    [x] Skin assessment post-treatment:  [x]intact []redness- no adverse reaction    []redness - adverse reaction:     35 min Therapeutic Exercise:  [x] See flow sheet :    Rationale: increase ROM, increase strength and improve balance to improve the patients ability to perform ADL's    15 min Therapeutic Activity:  [x]  See flow sheet : worked with pt on different variations of getting out of car in order to put less pressure on affected region; also encouraged her to discontinue sitting in low recliner on a daily basis due to increased pain with standing;    Rationale: increase ROM and increase strength  to improve the patients ability to walk up/down stairs, transfer in/out of car    15 min Manual Therapy:    STM/cross-friction massage over R gastroc, R lateral>medial HS  IASTM using \"boomerang\" tool, feathering technique used over lateral>medial gastroc, mid and distal HS, R achilles tendon, fibularis longus/brevis   Rationale: decrease pain, increase ROM and increase tissue extensibility  to improve the patients ability to ambulate          With   [] TE   [] TA   [] neuro   [] other: Patient Education: [x] Review HEP    [] Progressed/Changed HEP based on:   [] positioning   [] body mechanics   [] transfers   [] heat/ice application    [] other:      Other Objective/Functional Measures:   P! R HS strength testing    Pt appears to stand with toe out on R in normal stance and during gait cycle; she also appears to supinate on R>L (shoes are worn along lateral aspect of R shoe more so compared to the L)    TTP R fibular head; R biceps femoris       Pain Level (0-10 scale) post treatment: \"better\"    ASSESSMENT/Changes in Function:    Encouraged ice every 3-4 hours for 10-20 min during weekends/or while working from home. Spent time today discussing and trialing with different ways to exit car in order to decrease symptoms. Reviewed HS stretching at home. Asked her to bring new sneakers to next visit later this week.      Patient will continue to benefit from skilled PT services to modify and progress therapeutic interventions, address functional mobility deficits, address ROM deficits, address strength deficits, analyze and address soft tissue restrictions, analyze and cue movement patterns and analyze and modify body mechanics/ergonomics to attain remaining goals. []  See Plan of Care  []  See progress note/recertification  []  See Discharge Summary         Progress towards goals / Updated goals:  Short Term Goals: To be accomplished in 2-3 weeks:  1) Pt will be independent in initial HEP MET  2) Pt will report > or =25% decrease in exacerbation of symptoms     Long Term Goals:  To be accomplished in 4-6 weeks:  1) Pt will report being able to ambulate for > or =30 min with minimal to no pain in order to grocery shop  2) Pt will perform unilateral R HR > or =50% in to aid in performance of ADL's  3) Pt will improve FOTO score to > or = 42/100 in order to demonstrate improvement in functional mobility    PLAN  []  Upgrade activities as tolerated     [x]  Continue plan of care  []  Update interventions per flow sheet       []  Discharge due to:_  []  Other:_      Enio Gibson, PT 2/27/2018  10:12 AM

## 2018-02-28 LAB — BACTERIA UR CULT: ABNORMAL

## 2018-03-01 ENCOUNTER — APPOINTMENT (OUTPATIENT)
Dept: PHYSICAL THERAPY | Age: 73
End: 2018-03-01
Payer: COMMERCIAL

## 2018-03-02 ENCOUNTER — HOSPITAL ENCOUNTER (OUTPATIENT)
Dept: PHYSICAL THERAPY | Age: 73
Discharge: HOME | End: 2018-03-02
Payer: COMMERCIAL

## 2018-03-02 PROCEDURE — 97140 MANUAL THERAPY 1/> REGIONS: CPT | Performed by: PHYSICAL THERAPIST

## 2018-03-02 PROCEDURE — 97110 THERAPEUTIC EXERCISES: CPT | Performed by: PHYSICAL THERAPIST

## 2018-03-02 NOTE — PROGRESS NOTES
PT DAILY TREATMENT NOTE 2-15    Patient Name: Soha Whelan  Date:3/2/2018  : 1945  [x]  Patient  Verified  Payor: Sergei Crandall / Plan: Adia Champagne / Product Type: PPO /    In time: 8:30 AM  Out time: 9:45 AM  Total Treatment Time (min): 75 (65 timed)  Visit #: 14    Treatment Area: Pain in right knee [M25.561]    SUBJECTIVE  Pain Level (0-10 scale): 4/10  Any medication changes, allergies to medications, adverse drug reactions, diagnosis change, or new procedure performed?: [x] No    [] Yes (see summary sheet for update)  Subjective functional status/changes:   [] No changes reported  Pt states she tried to go down the stairs the way we discussed last PT visit and has tried to walk with her foot straight vs toed-out-- \"its worse, I can really feel it up in my hamstring now. I think my other leg was starting to act up too! \"    OBJECTIVE    Modality rationale: decrease inflammation, decrease pain and increase tissue extensibility to improve the patients ability to perform ADL's   Min Type Additional Details    [] Estim: []Att   []Unatt        []TENS instruct                  []IFC  []Premod   []NMES                     []Other:  []w/US   []w/ice   []w/heat  Position:  Location:    []  Traction: [] Cervical       []Lumbar                       [] Prone          []Supine                       []Intermittent   []Continuous Lbs:  [] before manual  [] after manual  []w/heat    []  Ultrasound: []Continuous   [] Pulsed at:                            []1MHz   []3MHz Location:  W/cm2:    []  Paraffin         Location:  []w/heat   Ice 10 [x]  Ice- post     []  Heat- pre  []  Ice massage Position:  Location:    []  Laser  []  Other: Position:  Location:    []  Vasopneumatic Device Pressure:       [] lo [] med [] hi   Temperature:    [x] Skin assessment post-treatment:  [x]intact []redness- no adverse reaction    []redness - adverse reaction:     50 min Therapeutic Exercise:  [x] See flow sheet : Rationale: increase ROM, increase strength and improve balance to improve the patients ability to perform ADL's     min Therapeutic Activity:  [x]  See flow sheet :    Rationale: increase ROM and increase strength  to improve the patients ability to walk up/down stairs, transfer in/out of car    15 min Manual Therapy:    STM/cross-friction massage over R gastroc, R lateral>medial HS  IASTM using \"boomerang\" tool, feathering technique used over lateral>medial gastroc, mid and distal HS, R achilles tendon, fibularis longus/brevis   Rationale: decrease pain, increase ROM and increase tissue extensibility  to improve the patients ability to ambulate          With   [] TE   [] TA   [] neuro   [] other: Patient Education: [x] Review HEP    [] Progressed/Changed HEP based on:   [] positioning   [] body mechanics   [] transfers   [] heat/ice application    [] other:      Other Objective/Functional Measures:   TTP R biceps femoris  TTP R gastroc, lateral head    Pain Level (0-10 scale) post treatment: 0/10    ASSESSMENT/Changes in Function:    Added modified figure 4 stretch and isometric HS strengthening exercise to HEP. Encouraged pt to use cane while ambulating in the community over the next few days to decreased pressure through R LE--- reviewed proper mechanics with cane; instructed her on use of cane in L UE and sequencing with R LE. Patient will continue to benefit from skilled PT services to modify and progress therapeutic interventions, address functional mobility deficits, address ROM deficits, address strength deficits, analyze and address soft tissue restrictions, analyze and cue movement patterns and analyze and modify body mechanics/ergonomics to attain remaining goals. []  See Plan of Care  []  See progress note/recertification  []  See Discharge Summary         Progress towards goals / Updated goals:  Short Term Goals:  To be accomplished in 2-3 weeks:  1) Pt will be independent in initial HEP MET  2) Pt will report > or =25% decrease in exacerbation of symptoms     Long Term Goals:  To be accomplished in 4-6 weeks:  1) Pt will report being able to ambulate for > or =30 min with minimal to no pain in order to grocery shop  2) Pt will perform unilateral R HR > or =50% in to aid in performance of ADL's  3) Pt will improve FOTO score to > or = 42/100 in order to demonstrate improvement in functional mobility    PLAN  []  Upgrade activities as tolerated     [x]  Continue plan of care  []  Update interventions per flow sheet       []  Discharge due to:_  []  Other:_      Rob Angulo, PT 3/2/2018  10:08 AM

## 2018-03-06 ENCOUNTER — HOSPITAL ENCOUNTER (OUTPATIENT)
Dept: PHYSICAL THERAPY | Age: 73
Discharge: HOME | End: 2018-03-06
Payer: COMMERCIAL

## 2018-03-06 PROCEDURE — 97140 MANUAL THERAPY 1/> REGIONS: CPT | Performed by: PHYSICAL THERAPIST

## 2018-03-06 PROCEDURE — 97110 THERAPEUTIC EXERCISES: CPT | Performed by: PHYSICAL THERAPIST

## 2018-03-06 NOTE — PROGRESS NOTES
PT DAILY TREATMENT NOTE 2-15    Patient Name: Whitney Stevenson  Date:3/6/2018  : 1945  [x]  Patient  Verified  Payor: Jack Chi / Plan: Merlin Reel / Product Type: PPO /    In time: 7:30 AM  Out time:  8:40 AM  Total Treatment Time (min): 70 (60 timed)  Visit #: 15    Treatment Area: Pain in right knee [M25.561]    SUBJECTIVE  Pain Level (0-10 scale): 2/10  Any medication changes, allergies to medications, adverse drug reactions, diagnosis change, or new procedure performed?: [x] No    [] Yes (see summary sheet for update)  Subjective functional status/changes:   [] No changes reported  Pt reports that she did not use the cane this weekend, however she took it easy and \"didn't do a whole lot\". She states the pain is better today.  She did go to the grocery store and was pushing a cart which aggravated her symptoms    OBJECTIVE    Modality rationale: decrease inflammation, decrease pain and increase tissue extensibility to improve the patients ability to perform ADL's   Min Type Additional Details    [] Estim: []Att   []Unatt        []TENS instruct                  []IFC  []Premod   []NMES                     []Other:  []w/US   []w/ice   []w/heat  Position:  Location:    []  Traction: [] Cervical       []Lumbar                       [] Prone          []Supine                       []Intermittent   []Continuous Lbs:  [] before manual  [] after manual  []w/heat    []  Ultrasound: []Continuous   [] Pulsed at:                            []1MHz   []3MHz Location:  W/cm2:    []  Paraffin         Location:  []w/heat   Ice 10 [x]  Ice- post     []  Heat- pre  []  Ice massage Position:  Location: R HS    []  Laser  []  Other: Position:  Location:    []  Vasopneumatic Device Pressure:       [] lo [] med [] hi   Temperature:    [x] Skin assessment post-treatment:  [x]intact []redness- no adverse reaction    []redness - adverse reaction:     45 min Therapeutic Exercise:  [x] See flow sheet : Rationale: increase ROM, increase strength and improve balance to improve the patients ability to perform ADL's     min Therapeutic Activity:  [x]  See flow sheet :    Rationale: increase ROM and increase strength  to improve the patients ability to walk up/down stairs, transfer in/out of car    15 min Manual Therapy:    STM/cross-friction massage over R HS  IASTM using \"boomerang\" tool, feathering technique used over biceps femoris   Rationale: decrease pain, increase ROM and increase tissue extensibility  to improve the patients ability to ambulate          With   [] TE   [] TA   [] neuro   [] other: Patient Education: [x] Review HEP    [] Progressed/Changed HEP based on:   [] positioning   [] body mechanics   [] transfers   [] heat/ice application    [] other:      Other Objective/Functional Measures:   P! Standing HS curl  TTP R biceps femoris    Pain Level (0-10 scale) post treatment: 0/10    ASSESSMENT/Changes in Function:    Tolerated TPR over prox HS today well-- \"it feels like it released something. And that spot in my leg isn't hurting as badly anymore\". Updated HEP. Pain appears to be originating from R biceps femoris at this time. Encouraged ice and stretching several times throughout workday. Patient will continue to benefit from skilled PT services to modify and progress therapeutic interventions, address functional mobility deficits, address ROM deficits, address strength deficits, analyze and address soft tissue restrictions, analyze and cue movement patterns and analyze and modify body mechanics/ergonomics to attain remaining goals. []  See Plan of Care  []  See progress note/recertification  []  See Discharge Summary         Progress towards goals / Updated goals:  Short Term Goals: To be accomplished in 2-3 weeks:  1) Pt will be independent in initial HEP MET  2) Pt will report > or =25% decrease in exacerbation of symptoms     Long Term Goals:  To be accomplished in 4-6 weeks:  1) Pt will report being able to ambulate for > or =30 min with minimal to no pain in order to grocery shop  2) Pt will perform unilateral R HR > or =50% in to aid in performance of ADL's  3) Pt will improve FOTO score to > or = 42/100 in order to demonstrate improvement in functional mobility    PLAN  []  Upgrade activities as tolerated     [x]  Continue plan of care  []  Update interventions per flow sheet       []  Discharge due to:_  []  Other:_      Raegan Hunter PT 3/6/2018  8:21 AM

## 2018-03-08 ENCOUNTER — HOSPITAL ENCOUNTER (OUTPATIENT)
Dept: PHYSICAL THERAPY | Age: 73
Discharge: HOME | End: 2018-03-08
Payer: COMMERCIAL

## 2018-03-08 DIAGNOSIS — J30.1 ALLERGIC RHINITIS DUE TO POLLEN: ICD-10-CM

## 2018-03-08 PROCEDURE — 97140 MANUAL THERAPY 1/> REGIONS: CPT | Performed by: PHYSICAL THERAPIST

## 2018-03-08 PROCEDURE — 97110 THERAPEUTIC EXERCISES: CPT | Performed by: PHYSICAL THERAPIST

## 2018-03-08 RX ORDER — MOMETASONE FUROATE 50 UG/1
SPRAY, METERED NASAL
Qty: 1 CONTAINER | Refills: 2 | Status: SHIPPED | OUTPATIENT
Start: 2018-03-08 | End: 2020-09-22 | Stop reason: SDUPTHER

## 2018-03-12 ENCOUNTER — APPOINTMENT (OUTPATIENT)
Dept: PHYSICAL THERAPY | Age: 73
End: 2018-03-12
Payer: COMMERCIAL

## 2018-03-13 ENCOUNTER — APPOINTMENT (OUTPATIENT)
Dept: PHYSICAL THERAPY | Age: 73
End: 2018-03-13
Payer: COMMERCIAL

## 2018-03-14 ENCOUNTER — HOSPITAL ENCOUNTER (OUTPATIENT)
Dept: PHYSICAL THERAPY | Age: 73
Discharge: HOME | End: 2018-03-14
Payer: COMMERCIAL

## 2018-03-14 DIAGNOSIS — M25.561 ACUTE PAIN OF RIGHT KNEE: Primary | ICD-10-CM

## 2018-03-14 PROCEDURE — 97035 APP MDLTY 1+ULTRASOUND EA 15: CPT | Performed by: PHYSICAL THERAPIST

## 2018-03-14 PROCEDURE — 97140 MANUAL THERAPY 1/> REGIONS: CPT | Performed by: PHYSICAL THERAPIST

## 2018-03-14 PROCEDURE — 97110 THERAPEUTIC EXERCISES: CPT | Performed by: PHYSICAL THERAPIST

## 2018-03-14 NOTE — PROGRESS NOTES
PT DAILY TREATMENT NOTE 2-15    Patient Name: Edita Medellin  Date:3/14/2018  : 1945  [x]  Patient  Verified  Payor: Richardine Goodpasture / Plan: Az Panchal / Product Type: PPO /    In time: 8:35 AM  Out time:  9:45 AM  Total Treatment Time (min): 70 (60 timed)  Visit #: 17    Treatment Area: Pain in right knee [M25.561]    SUBJECTIVE  Pain Level (0-10 scale): 10  Any medication changes, allergies to medications, adverse drug reactions, diagnosis change, or new procedure performed?: [x] No    [] Yes (see summary sheet for update)  Subjective functional status/changes:   [] No changes reported  Pt has been fighting a respiratory infection this past weekend; she states increased pain today. \"the other side was starting to bother me too, in the same spot\".  She states 25% improvement- \"my legs feels stronger but that pain is still there\"    OBJECTIVE    Modality rationale: decrease inflammation, decrease pain and increase tissue extensibility to improve the patients ability to perform ADL's   Min Type Additional Details    [] Estim: []Att   []Unatt        []TENS instruct                  []IFC  []Premod   []NMES                     []Other:  []w/US   []w/ice   []w/heat  Position:  Location:    []  Traction: [] Cervical       []Lumbar                       [] Prone          []Supine                       []Intermittent   []Continuous Lbs:  [] before manual  [] after manual  []w/heat   8 [x]  Ultrasound: [x]Continuous   [] Pulsed at:                            [x]1MHz   []3MHz Location:R Lateral joint line, posterior/Lateral knee  W/cm2: 1.2    []  Paraffin         Location:  []w/heat   Ice 10 [x]  Ice- post     []  Heat- pre  []  Ice massage Position:  Location: R HS    []  Laser  []  Other: Position:  Location:    []  Vasopneumatic Device Pressure:       [] lo [] med [] hi   Temperature:    [x] Skin assessment post-treatment:  [x]intact []redness- no adverse reaction    []redness - adverse reaction:     44 min Therapeutic Exercise:  [x] See flow sheet :    Rationale: increase ROM, increase strength and improve balance to improve the patients ability to perform ADL's     min Therapeutic Activity:  [x]  See flow sheet :    Rationale: increase ROM and increase strength  to improve the patients ability to walk up/down stairs, transfer in/out of car    8 min Manual Therapy:    STM/cross-friction massage over biceps femoris and pox HS   Rationale: decrease pain, increase ROM and increase tissue extensibility  to improve the patients ability to ambulate          With   [] TE   [] TA   [] neuro   [] other: Patient Education: [x] Review HEP    [] Progressed/Changed HEP based on:   [] positioning   [] body mechanics   [] transfers   [] heat/ice application    [] other:      Other Objective/Functional Measures:   P! Standing and prone HS curl  TTP R biceps femoris  Antony's and Thessley's neg on R  TTP R lateral joint line  P! R hip flex, ER with knee flex (trying to cross leg to put on shoes/socks)    Lumbar AROM  Flexion= fingers to toes. No change in symptoms  Ext= 50-75%. No change in symptoms  R and L SBing= WNL. No change in symptoms    Pain Level (0-10 scale) post treatment: not reported    ASSESSMENT/Changes in Function:    Strongly encouraged pt to f/u with PCP or with ortho MD regarding next steps and possible imaging-- if purely a muscular injury would expect to have seen greater improvement at this time. 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      Patient will continue to benefit from skilled PT services to modify and progress therapeutic interventions, address functional mobility deficits, address ROM deficits, address strength deficits, analyze and address soft tissue restrictions, analyze and cue movement patterns and analyze and modify body mechanics/ergonomics to attain remaining goals. []  See Plan of Care  []  See progress note/recertification  []  See Discharge Summary         Progress towards goals / Updated goals:  Short Term Goals: To be accomplished in 2-3 weeks:  1) Pt will be independent in initial HEP MET  2) Pt will report > or =25% decrease in exacerbation of symptoms     Long Term Goals:  To be accomplished in 4-6 weeks:  1) Pt will report being able to ambulate for > or =30 min with minimal to no pain in order to grocery shop  2) Pt will perform unilateral R HR > or =50% in to aid in performance of ADL's  3) Pt will improve FOTO score to > or = 42/100 in order to demonstrate improvement in functional mobility    PLAN  []  Upgrade activities as tolerated     [x]  Continue plan of care  []  Update interventions per flow sheet       []  Discharge due to:_  []  Other:_      Mae Mathias PT 3/14/2018  10:39 AM

## 2018-03-19 ENCOUNTER — HOSPITAL ENCOUNTER (OUTPATIENT)
Dept: MRI IMAGING | Age: 73
Discharge: HOME | End: 2018-03-19
Attending: INTERNAL MEDICINE
Payer: COMMERCIAL

## 2018-03-19 DIAGNOSIS — M25.561 ACUTE PAIN OF RIGHT KNEE: ICD-10-CM

## 2018-03-19 PROBLEM — S83.206A ACUTE MENISCAL TEAR OF RIGHT KNEE: Status: ACTIVE | Noted: 2018-03-19

## 2018-03-19 PROCEDURE — 73721 MRI JNT OF LWR EXTRE W/O DYE: CPT

## 2018-03-20 ENCOUNTER — APPOINTMENT (OUTPATIENT)
Dept: PHYSICAL THERAPY | Age: 73
End: 2018-03-20
Payer: COMMERCIAL

## 2018-03-22 ENCOUNTER — APPOINTMENT (OUTPATIENT)
Dept: PHYSICAL THERAPY | Age: 73
End: 2018-03-22
Payer: COMMERCIAL

## 2018-03-27 ENCOUNTER — HOSPITAL ENCOUNTER (OUTPATIENT)
Dept: PHYSICAL THERAPY | Age: 73
Discharge: HOME | End: 2018-03-27
Payer: COMMERCIAL

## 2018-03-27 PROCEDURE — 97110 THERAPEUTIC EXERCISES: CPT | Performed by: PHYSICAL THERAPIST

## 2018-03-27 PROCEDURE — 97140 MANUAL THERAPY 1/> REGIONS: CPT | Performed by: PHYSICAL THERAPIST

## 2018-03-27 NOTE — PROGRESS NOTES
PT DAILY TREATMENT NOTE 2-15    Patient Name: Kay Dey  Date:3/27/2018  : 1945  [x]  Patient  Verified  Payor: Gin Lucas / Plan: Nba Glass / Product Type: PPO /    In time: 9:30 AM  Out time:  10:45 AM  Total Treatment Time (min): 75 (65 timed)  Visit #: 18    Treatment Area: Pain in right knee [M25.561]    SUBJECTIVE  Pain Level (0-10 scale): 4/10  Any medication changes, allergies to medications, adverse drug reactions, diagnosis change, or new procedure performed?: [x] No    [] Yes (see summary sheet for update)  Subjective functional status/changes:   [] No changes reported  Since last visit, pt has gotten MRI-- + 1. High-grade radial tear in the posterior horn of the medial meniscus with  partial extrusion of the meniscus in a horizontal tear in the body. 2. Moderate patellofemoral compartment osteophytosis. 3. 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 She followed up with Dr. Florence Krishnamurthy on Wednesday 3/21-- Brielle Thompson said that there is a lot of arthritis in there as well and that the arthritis probably caused the meniscus tear. \" She states that MD believes the same thing is going on in the L knee. She states that MD gave her a cortisone injection and she has 70% improvement in pain/symptoms. MD wants her to continue PT for strengthening in case she needs surgery-- he would like to manage her pain with cortisone until it is necessary for her to have a total knee- \"he said he could clean up the meniscus tear but he's not sure it will do much for my pain because the arthritis is so bad\"    She states that since the shot she has more \"creaking\" in her knees and pain on the anterior aspect. He would like for her to stop walking for exercise and use the stationary bike and water aerobics.  She is planning to look into joining the [facility]    OBJECTIVE    Modality rationale: decrease inflammation, decrease pain and increase tissue extensibility to improve the patients ability to perform ADL's   Min Type Additional Details    [] Estim: []Att   []Unatt        []TENS instruct                  []IFC  []Premod   []NMES                     []Other:  []w/US   []w/ice   []w/heat  Position:  Location:    []  Traction: [] Cervical       []Lumbar                       [] Prone          []Supine                       []Intermittent   []Continuous Lbs:  [] before manual  [] after manual  []w/heat   -- []  Ultrasound: []Continuous   [] Pulsed at:                            []1MHz   []3MHz Location  W/cm2:     []  Paraffin         Location:  []w/heat   Ice 10 [x]  Ice- post     []  Heat- pre  []  Ice massage Position:  Location: bilat knees    []  Laser  []  Other: Position:  Location:    []  Vasopneumatic Device Pressure:       [] lo [] med [] hi   Temperature:    [x] Skin assessment post-treatment:  [x]intact []redness- no adverse reaction    []redness - adverse reaction:     55 min Therapeutic Exercise:  [x] See flow sheet :    Rationale: increase ROM, increase strength and improve balance to improve the patients ability to perform ADL's     min Therapeutic Activity:  [x]  See flow sheet :    Rationale: increase ROM and increase strength  to improve the patients ability to walk up/down stairs, transfer in/out of car    10 min Manual Therapy:    Tibiofemoral distraction with strap with posterior/anterior mobs for pain control bilat   Rationale: decrease pain, increase ROM and increase tissue extensibility  to improve the patients ability to ambulate          With   [] TE   [] TA   [] neuro   [] other: Patient Education: [x] Review HEP    [] Progressed/Changed HEP based on:   [] positioning   [] body mechanics   [] transfers   [] heat/ice application    [] other:      Other Objective/Functional Measures:   n/a    Pain Level (0-10 scale) post treatment: \"better\"    ASSESSMENT/Changes in Function:    Pt challenged with addition of several strengthening exercises today;continue with strengthening hips, core, quads, HS as tolerated. Patient will continue to benefit from skilled PT services to modify and progress therapeutic interventions, address functional mobility deficits, address ROM deficits, address strength deficits, analyze and address soft tissue restrictions, analyze and cue movement patterns and analyze and modify body mechanics/ergonomics to attain remaining goals. []  See Plan of Care  []  See progress note/recertification  []  See Discharge Summary         Progress towards goals / Updated goals:  Short Term Goals: To be accomplished in 2-3 weeks:  1) Pt will be independent in initial HEP MET  2) Pt will report > or =25% decrease in exacerbation of symptoms     Long Term Goals:  To be accomplished in 4-6 weeks:  1) Pt will report being able to ambulate for > or =30 min with minimal to no pain in order to grocery shop  2) Pt will perform unilateral R HR > or =50% in to aid in performance of ADL's  3) Pt will improve FOTO score to > or = 42/100 in order to demonstrate improvement in functional mobility    PLAN  []  Upgrade activities as tolerated     [x]  Continue plan of care  []  Update interventions per flow sheet       []  Discharge due to:_  []  Other:_      Bola Leos, PT 3/27/2018  9:28 AM

## 2018-03-30 ENCOUNTER — APPOINTMENT (OUTPATIENT)
Dept: PHYSICAL THERAPY | Age: 73
End: 2018-03-30
Payer: COMMERCIAL

## 2018-04-03 ENCOUNTER — HOSPITAL ENCOUNTER (OUTPATIENT)
Dept: PHYSICAL THERAPY | Age: 73
Discharge: HOME | End: 2018-04-03
Payer: COMMERCIAL

## 2018-04-03 PROCEDURE — 97110 THERAPEUTIC EXERCISES: CPT | Performed by: PHYSICAL THERAPIST

## 2018-04-03 PROCEDURE — 97140 MANUAL THERAPY 1/> REGIONS: CPT | Performed by: PHYSICAL THERAPIST

## 2018-04-03 NOTE — PROGRESS NOTES
PT DAILY TREATMENT NOTE 2-15    Patient Name: Aron Cooks  Date:4/3/2018  : 1945  [x]  Patient  Verified  Payor: Eren Osorio / Plan: Shelley Casanova / Product Type: PPO /    In time: 8:30 AM  Out time:  9:30 AM  Total Treatment Time (min): 60 (60 timed)  Visit #: 19    Treatment Area: Pain in right knee [M25.561]    SUBJECTIVE  Pain Level (0-10 scale): 10  Any medication changes, allergies to medications, adverse drug reactions, diagnosis change, or new procedure performed?: [x] No    [] Yes (see summary sheet for update)  Subjective functional status/changes:   [] No changes reported  Pt states that her pain has started to come back- \"its still better than it was\"     OBJECTIVE    Modality rationale: decrease inflammation, decrease pain and increase tissue extensibility to improve the patients ability to perform ADL's   Min Type Additional Details    [] Estim: []Att   []Unatt        []TENS instruct                  []IFC  []Premod   []NMES                     []Other:  []w/US   []w/ice   []w/heat  Position:  Location:    []  Traction: [] Cervical       []Lumbar                       [] Prone          []Supine                       []Intermittent   []Continuous Lbs:  [] before manual  [] after manual  []w/heat   -- []  Ultrasound: []Continuous   [] Pulsed at:                            []1MHz   []3MHz Location  W/cm2:     []  Paraffin         Location:  []w/heat   Ice 10 [x]  Ice- post     []  Heat- pre  []  Ice massage Position:  Location: bilat knees    []  Laser  []  Other: Position:  Location:    []  Vasopneumatic Device Pressure:       [] lo [] med [] hi   Temperature:    [x] Skin assessment post-treatment:  [x]intact []redness- no adverse reaction    []redness - adverse reaction:     40 min Therapeutic Exercise:  [x] See flow sheet :    Rationale: increase ROM, increase strength and improve balance to improve the patients ability to perform ADL's     min Therapeutic Activity: [x]  See flow sheet :    Rationale: increase ROM and increase strength  to improve the patients ability to walk up/down stairs, transfer in/out of car    10 min Manual Therapy:    Tibiofemoral distraction with strap with posterior/anterior mobs for pain control bilat- HELD  STM R HS, roller over R ITB   Rationale: decrease pain, increase ROM and increase tissue extensibility  to improve the patients ability to ambulate          With   [] TE   [] TA   [] neuro   [] other: Patient Education: [x] Review HEP    [] Progressed/Changed HEP based on:   [] positioning   [] body mechanics   [] transfers   [] heat/ice application    [] other:      Other Objective/Functional Measures:   n/a    Pain Level (0-10 scale) post treatment: 0/10    ASSESSMENT/Changes in Function:    Continue to progress strengthening as tolerated. Advised continuing use of ice at home. Patient will continue to benefit from skilled PT services to modify and progress therapeutic interventions, address functional mobility deficits, address ROM deficits, address strength deficits, analyze and address soft tissue restrictions, analyze and cue movement patterns and analyze and modify body mechanics/ergonomics to attain remaining goals. []  See Plan of Care  []  See progress note/recertification  []  See Discharge Summary         Progress towards goals / Updated goals:  Short Term Goals: To be accomplished in 2-3 weeks:  1) Pt will be independent in initial HEP MET  2) Pt will report > or =25% decrease in exacerbation of symptoms     Long Term Goals:  To be accomplished in 4-6 weeks:  1) Pt will report being able to ambulate for > or =30 min with minimal to no pain in order to grocery shop  2) Pt will perform unilateral R HR > or =50% in to aid in performance of ADL's  3) Pt will improve FOTO score to > or = 42/100 in order to demonstrate improvement in functional mobility    PLAN  []  Upgrade activities as tolerated     [x]  Continue plan of care  []  Update interventions per flow sheet       []  Discharge due to:_  []  Other:_      Maribel Bae, PT 4/3/2018  8:32 AM

## 2018-04-06 ENCOUNTER — HOSPITAL ENCOUNTER (OUTPATIENT)
Dept: PHYSICAL THERAPY | Age: 73
Discharge: HOME | End: 2018-04-06
Payer: COMMERCIAL

## 2018-04-06 PROCEDURE — 97110 THERAPEUTIC EXERCISES: CPT | Performed by: PHYSICAL THERAPIST

## 2018-04-06 PROCEDURE — 97140 MANUAL THERAPY 1/> REGIONS: CPT | Performed by: PHYSICAL THERAPIST

## 2018-04-06 NOTE — PROGRESS NOTES
PT DAILY TREATMENT NOTE 2-15    Patient Name: Cecelia Joe  Date:2018  : 1945  [x]  Patient  Verified  Payor: Karen Trevino / Plan: Gissell Aldridge / Product Type: PPO /    In time: 9:00 AM  Out time:  10:10 AM  Total Treatment Time (min): 70 (60 timed)  Visit #: 20    Treatment Area: Pain in right knee [M25.561]    SUBJECTIVE  Pain Level (0-10 scale): 2/10  Any medication changes, allergies to medications, adverse drug reactions, diagnosis change, or new procedure performed?: [x] No    [] Yes (see summary sheet for update)  Subjective functional status/changes:   [] No changes reported  Pt reports that she feels she has gotten much stronger; the pain is still present during certain activities    OBJECTIVE    Modality rationale: decrease inflammation, decrease pain and increase tissue extensibility to improve the patients ability to perform ADL's   Min Type Additional Details    [] Estim: []Att   []Unatt        []TENS instruct                  []IFC  []Premod   []NMES                     []Other:  []w/US   []w/ice   []w/heat  Position:  Location:    []  Traction: [] Cervical       []Lumbar                       [] Prone          []Supine                       []Intermittent   []Continuous Lbs:  [] before manual  [] after manual  []w/heat   -- []  Ultrasound: []Continuous   [] Pulsed at:                            []1MHz   []3MHz Location  W/cm2:     []  Paraffin         Location:  []w/heat   Ice 10 [x]  Ice- post     []  Heat- pre  []  Ice massage Position:  Location: bilat knees    []  Laser  []  Other: Position:  Location:    []  Vasopneumatic Device Pressure:       [] lo [] med [] hi   Temperature:    [x] Skin assessment post-treatment:  [x]intact []redness- no adverse reaction    []redness - adverse reaction:     45 min Therapeutic Exercise:  [x] See flow sheet :    Rationale: increase ROM, increase strength and improve balance to improve the patients ability to perform ADL's min Therapeutic Activity:  [x]  See flow sheet :    Rationale: increase ROM and increase strength  to improve the patients ability to walk up/down stairs, transfer in/out of car    15 min Manual Therapy:    Tibiofemoral distraction with strap with posterior/anterior mobs for pain control bilat   STM, roller over R HS, roller over R ITB   Rationale: decrease pain, increase ROM and increase tissue extensibility  to improve the patients ability to ambulate          With   [] TE   [] TA   [] neuro   [] other: Patient Education: [x] Review HEP    [] Progressed/Changed HEP based on:   [] positioning   [] body mechanics   [] transfers   [] heat/ice application    [] other:      Other Objective/Functional Measures:   n/a    Pain Level (0-10 scale) post treatment: 0/10    ASSESSMENT/Changes in Function:    Transitioning to HEP at this time-- will extensively review HEP at last two visits next week and plan to D/C at this time. Overall progressing well and very compliant with HEP     Patient will continue to benefit from skilled PT services to modify and progress therapeutic interventions, address functional mobility deficits, address ROM deficits, address strength deficits, analyze and address soft tissue restrictions, analyze and cue movement patterns and analyze and modify body mechanics/ergonomics to attain remaining goals. []  See Plan of Care  []  See progress note/recertification  []  See Discharge Summary         Progress towards goals / Updated goals:  Short Term Goals: To be accomplished in 2-3 weeks:  1) Pt will be independent in initial HEP MET  2) Pt will report > or =25% decrease in exacerbation of symptoms     Long Term Goals:  To be accomplished in 4-6 weeks:  1) Pt will report being able to ambulate for > or =30 min with minimal to no pain in order to grocery shop  2) Pt will perform unilateral R HR > or =50% in to aid in performance of ADL's  3) Pt will improve FOTO score to > or = 42/100 in order to demonstrate improvement in functional mobility    PLAN  []  Upgrade activities as tolerated     [x]  Continue plan of care  []  Update interventions per flow sheet       []  Discharge due to:_  []  Other:_      Bakari Hawkins PT 4/6/2018  10:56 AM

## 2018-04-10 ENCOUNTER — HOSPITAL ENCOUNTER (OUTPATIENT)
Dept: PHYSICAL THERAPY | Age: 73
Discharge: HOME | End: 2018-04-10
Payer: COMMERCIAL

## 2018-04-10 PROCEDURE — 97140 MANUAL THERAPY 1/> REGIONS: CPT | Performed by: PHYSICAL THERAPIST

## 2018-04-10 PROCEDURE — 97110 THERAPEUTIC EXERCISES: CPT | Performed by: PHYSICAL THERAPIST

## 2018-04-10 NOTE — PROGRESS NOTES
PT DAILY TREATMENT NOTE 2-15    Patient Name: Rory Friend  Date:4/10/2018  : 1945  [x]  Patient  Verified  Payor: Dana Winter / Plan: Isai Sawyer / Product Type: PPO /    In time: 8:00 AM  Out time:  9:10 AM  Total Treatment Time (min): 70 (60 timed)  Visit #: 21    Treatment Area: Pain in right knee [M25.561]    SUBJECTIVE  Pain Level (0-10 scale): 1-2/10  Any medication changes, allergies to medications, adverse drug reactions, diagnosis change, or new procedure performed?: [x] No    [] Yes (see summary sheet for update)  Subjective functional status/changes:   [] No changes reported  Pt states that her pain levels have been relatively low over the past few days; she feels she has gotten much stronger.  Good compliance with HEP    OBJECTIVE    Modality rationale: decrease inflammation, decrease pain and increase tissue extensibility to improve the patients ability to perform ADL's   Min Type Additional Details    [] Estim: []Att   []Unatt        []TENS instruct                  []IFC  []Premod   []NMES                     []Other:  []w/US   []w/ice   []w/heat  Position:  Location:    []  Traction: [] Cervical       []Lumbar                       [] Prone          []Supine                       []Intermittent   []Continuous Lbs:  [] before manual  [] after manual  []w/heat   -- []  Ultrasound: []Continuous   [] Pulsed at:                            []1MHz   []3MHz Location  W/cm2:     []  Paraffin         Location:  []w/heat   Ice 10 [x]  Ice- post     []  Heat- pre  []  Ice massage Position:  Location: bilat knees    []  Laser  []  Other: Position:  Location:    []  Vasopneumatic Device Pressure:       [] lo [] med [] hi   Temperature:    [x] Skin assessment post-treatment:  [x]intact []redness- no adverse reaction    []redness - adverse reaction:     45 min Therapeutic Exercise:  [x] See flow sheet :    Rationale: increase ROM, increase strength and improve balance to improve the patients ability to perform ADL's     min Therapeutic Activity:  [x]  See flow sheet :    Rationale: increase ROM and increase strength  to improve the patients ability to walk up/down stairs, transfer in/out of car    15 min Manual Therapy:    Tibiofemoral distraction with strap with posterior/anterior mobs for pain control bilat   STM, roller over R HS, roller over R ITB   Rationale: decrease pain, increase ROM and increase tissue extensibility  to improve the patients ability to ambulate          With   [] TE   [] TA   [] neuro   [] other: Patient Education: [x] Review HEP    [] Progressed/Changed HEP based on:   [] positioning   [] body mechanics   [] transfers   [] heat/ice application    [] other:      Other Objective/Functional Measures:   n/a    Pain Level (0-10 scale) post treatment: 0/10    ASSESSMENT/Changes in Function:    Reviewed home HEP sheet, pt given updated therabands for progression of exercises. Plan to D/C next visit. Patient will continue to benefit from skilled PT services to modify and progress therapeutic interventions, address functional mobility deficits, address ROM deficits, address strength deficits, analyze and address soft tissue restrictions, analyze and cue movement patterns and analyze and modify body mechanics/ergonomics to attain remaining goals. []  See Plan of Care  []  See progress note/recertification  []  See Discharge Summary         Progress towards goals / Updated goals:  Short Term Goals: To be accomplished in 2-3 weeks:  1) Pt will be independent in initial HEP MET  2) Pt will report > or =25% decrease in exacerbation of symptoms     Long Term Goals:  To be accomplished in 4-6 weeks:  1) Pt will report being able to ambulate for > or =30 min with minimal to no pain in order to grocery shop  2) Pt will perform unilateral R HR > or =50% in to aid in performance of ADL's  3) Pt will improve FOTO score to > or = 42/100 in order to demonstrate improvement in functional mobility    PLAN  []  Upgrade activities as tolerated     [x]  Continue plan of care  []  Update interventions per flow sheet       []  Discharge due to:_  []  Other:_      Michael oFrman PT 4/10/2018  8:45 AM

## 2018-04-13 ENCOUNTER — HOSPITAL ENCOUNTER (OUTPATIENT)
Dept: PHYSICAL THERAPY | Age: 73
Discharge: HOME | End: 2018-04-13
Payer: COMMERCIAL

## 2018-04-13 PROCEDURE — 97110 THERAPEUTIC EXERCISES: CPT | Performed by: PHYSICAL THERAPIST

## 2018-04-13 NOTE — PROGRESS NOTES
PT DAILY TREATMENT/Progress NOTE 2-15    Patient Name: Dawna Arechiga  Date:2018  : 1945  [x]  Patient  Verified  Payor: Raghav Madrigal / Plan: Brent Montanez / Product Type: PPO /    In time: 8:35 AM  Out time:  9:35 AM  Total Treatment Time (min): 70 (50 timed)  Visit #: 22    Treatment Area: Pain in right knee [M25.561]    SUBJECTIVE  Pain Level (0-10 scale): 0/10  Any medication changes, allergies to medications, adverse drug reactions, diagnosis change, or new procedure performed?: [x] No    [] Yes (see summary sheet for update)  Subjective functional status/changes:   [] No changes reported  Pt states that she drove from [facility] and back to [facility] for the first time yesterday since - \"i feel like that was good to do before I graduate\"-- she states minimal pain behind knee. She states very good compliance with HEP. Pt feels that she has gotten much stronger since beginning therapy. She also states that her grandson has gotten her to stop eating carbs so she is looking to eat an anti-inflammatory diet and lose some weight.  \"I actually do feel better\"  She continues to demonstrate difficulty with driving, standing after sitting for prolonged periods, getting in/out of the car    OBJECTIVE    Modality rationale: decrease inflammation, decrease pain and increase tissue extensibility to improve the patients ability to perform ADL's   Min Type Additional Details    [] Estim: []Att   []Unatt        []TENS instruct                  []IFC  []Premod   []NMES                     []Other:  []w/US   []w/ice   []w/heat  Position:  Location:    []  Traction: [] Cervical       []Lumbar                       [] Prone          []Supine                       []Intermittent   []Continuous Lbs:  [] before manual  [] after manual  []w/heat   -- []  Ultrasound: []Continuous   [] Pulsed at:                            []1MHz   []3MHz Location  W/cm2:     []  Paraffin Location:  []w/heat   Ice 10  10 [x]  Ice- post     [x]  Heat- pre  []  Ice massage Position:  Location: bilat knees    []  Laser  []  Other: Position:  Location:    []  Vasopneumatic Device Pressure:       [] lo [] med [] hi   Temperature:    [x] Skin assessment post-treatment:  [x]intact []redness- no adverse reaction    []redness - adverse reaction:     45 min Therapeutic Exercise:  [x] See flow sheet :  Extensively reviewed HEP   Rationale: increase ROM, increase strength and improve balance to improve the patients ability to perform ADL's    5 min Manual Therapy:    Tibiofemoral distraction with strap with posterior/anterior mobs for pain control bilat    Rationale: decrease pain, increase ROM and increase tissue extensibility  to improve the patients ability to ambulate          With   [] TE   [] TA   [] neuro   [] other: Patient Education: [x] Review HEP    [] Progressed/Changed HEP based on:   [] positioning   [] body mechanics   [] transfers   [] heat/ice application    [] other:      Other Objective/Functional Measures:   Improved PF strength; pt able to perform approx 25-50% of full HR unilaterally R and L  TTP R biceps femoris  TTP medial and lateral joint lines on R    FOTO= 58/100    Pain Level (0-10 scale) post treatment: 0/10    ASSESSMENT/Changes in Function:    Pt has completed 22 skilled PT visits. She has met 2/2 STG's and 1/3 LTG's. Since initial evaluation, pt had R knee MRI + (1) High-grade radial tear in the posterior horn of the medial meniscus with partial extrusion of the meniscus in a horizontal tear in the body (2) Moderate patellofemoral compartment osteophytosis (3) Mild to moderate osteoarthritis in the medial compartment. She has been to see Dr. Manuel Kitchen, ortho who gave her steroid injections- significant improvement in symptoms since shots. She plans to have steroid shots every few months until total knee is necessary.  Extensively reviewed HEP today and reviewed progression of exercises at home as pt gets stronger. Pt ready for D/C from PT at this time. []  See Plan of Care  []  See progress note/recertification  [x]  See Discharge Summary         Progress towards goals / Updated goals:  Short Term Goals: To be accomplished in 2-3 weeks:  1) Pt will be independent in initial HEP MET  2) Pt will report > or =25% decrease in exacerbation of symptoms MET     Long Term Goals:  To be accomplished in 4-6 weeks:  1) Pt will report being able to ambulate for > or =30 min with minimal to no pain in order to grocery shop not met  2) Pt will perform unilateral R HR > or =50% in to aid in performance of ADL's not met  3) Pt will improve FOTO score to > or = 42/100 in order to demonstrate improvement in functional mobility MET    PLAN  D/C to [facility], PT 4/13/2018  8:41 AM

## 2018-04-13 NOTE — ANCILLARY DISCHARGE INSTRUCTIONS
[facility]  [facility], [address] [place] [address]  Phone: [phone]  Fax: [phone]    Discharge Summary  2-15    Patient name: Jasmin Mckenzie  : 1945  Provider#:[id number]  Referral source: Aram Halsted, MD      Medical/Treatment Diagnosis: Pain in right knee [M25.561]     Prior Hospitalization: see medical history     Comorbidities: see evaluation  Prior Level of Function:see evaluation  Medications: Verified on Patient Summary List    Start of Care: 18      Onset Date:see evaluation   Visits from Start of Care: 22     Missed Visits: see CC  Reporting Period : 18 to 18    Summary of care:   Improved PF strength; pt able to perform approx 25-50% of full HR unilaterally R and L  TTP R biceps femoris  TTP medial and lateral joint lines on R     FOTO= 58/100     Progress towards goals  Short Term Goals:   1) Pt will be independent in initial HEP MET  2) Pt will report > or =25% decrease in exacerbation of symptoms MET      Long Term Goals:   1) Pt will report being able to ambulate for > or =30 min with minimal to no pain in order to grocery shop not met  2) Pt will perform unilateral R HR > or =50% in to aid in performance of ADL's not met  3) Pt will improve FOTO score to > or = 42/100 in order to demonstrate improvement in functional mobility MET    ASSESSMENT  Pt has completed 22 skilled PT visits. She has met 2/2 STG's and 1/3 LTG's. Since initial evaluation, pt had R knee MRI + (1) High-grade radial tear in the posterior horn of the medial meniscus with partial extrusion of the meniscus in a horizontal tear in the body (2) Moderate patellofemoral compartment osteophytosis (3) Mild to moderate osteoarthritis in the medial compartment. She has been to see Dr. Saige Caraballo, ortho who gave her steroid injections- significant improvement in symptoms since shots. She plans to have steroid shots every few months until total knee is necessary.  Extensively reviewed HEP today and reviewed progression of exercises at home as pt gets stronger. Pt ready for D/C from PT at this time.       RECOMMENDATIONS:  [x]Discontinue therapy: [x]Patient has reached or is progressing toward set goals      []Patient is non-compliant or has abdicated      []Due to lack of appreciable progress towards set goals    Kiki Madison, PT 4/13/2018 1:29 PM

## 2018-04-26 ENCOUNTER — TELEPHONE (OUTPATIENT)
Dept: INTERNAL MEDICINE CLINIC | Age: 73
End: 2018-04-26

## 2018-05-19 DIAGNOSIS — I10 ESSENTIAL HYPERTENSION, BENIGN: ICD-10-CM

## 2018-05-20 RX ORDER — HYDROCHLOROTHIAZIDE 25 MG/1
TABLET ORAL
Qty: 90 TAB | Refills: 0 | Status: SHIPPED | OUTPATIENT
Start: 2018-05-20 | End: 2018-08-18 | Stop reason: SDUPTHER

## 2018-05-21 NOTE — TELEPHONE ENCOUNTER
Please call patient.   She was supposed to set up [address] in June, nothing scheduled, needs to schedule f/u

## 2018-06-13 DIAGNOSIS — F41.9 ANXIETY: ICD-10-CM

## 2018-06-13 NOTE — TELEPHONE ENCOUNTER
From: Melissa Day  To: Maicol Ricks MD  Sent: 6/13/2018 11:59 AM EDT  Subject: Medication Renewal Request    Original authorizing provider: MD Donovan Pierson. Thania Irizarry would like a refill of the following medications:  ALPRAZolam Sera Nest) 0.25 mg tablet [Maurice Moses MD]    Preferred pharmacy: [facility] #[id number]- [place], [address]    Comment:  Codi Moeller, I have two trips between now and seeing you on 7/11 and you know how I am with flying! Thanks for calling in a refill. ...... Mehul Kinney

## 2018-06-14 RX ORDER — ALPRAZOLAM 0.25 MG/1
TABLET ORAL
Qty: 10 TAB | Refills: 0 | OUTPATIENT
Start: 2018-06-14 | End: 2018-09-05 | Stop reason: SDUPTHER

## 2018-06-29 DIAGNOSIS — M79.604 LEG PAIN, RIGHT: ICD-10-CM

## 2018-06-29 NOTE — TELEPHONE ENCOUNTER
From: Huseyin Rocha  To: Carola Rodriguez MD  Sent: 6/29/2018 7:47 AM EDT  Subject: Medication Renewal Request    Original authorizing provider: MD Faye Espana.  Mera Itz would like a refill of the following medications:  tiZANidine (ZANAFLEX) 2 mg tablet [Maurice Sapp MD]    Preferred pharmacy: [facility]/PHARMACY [address] [place] [address] , [facility]:

## 2018-07-02 RX ORDER — TIZANIDINE 2 MG/1
TABLET ORAL
Qty: 10 TAB | Refills: 1 | Status: SHIPPED | OUTPATIENT
Start: 2018-07-02 | End: 2019-12-31 | Stop reason: SDUPTHER

## 2018-07-11 ENCOUNTER — OFFICE VISIT (OUTPATIENT)
Dept: INTERNAL MEDICINE CLINIC | Age: 73
End: 2018-07-11

## 2018-07-11 VITALS
HEART RATE: 74 BPM | SYSTOLIC BLOOD PRESSURE: 146 MMHG | OXYGEN SATURATION: 95 % | WEIGHT: 230 LBS | HEIGHT: 63 IN | DIASTOLIC BLOOD PRESSURE: 80 MMHG | RESPIRATION RATE: 14 BRPM | BODY MASS INDEX: 40.75 KG/M2 | TEMPERATURE: 97.8 F

## 2018-07-11 DIAGNOSIS — S83.206D ACUTE MENISCAL TEAR OF RIGHT KNEE, SUBSEQUENT ENCOUNTER: ICD-10-CM

## 2018-07-11 DIAGNOSIS — E66.01 OBESITY, MORBID (HCC): ICD-10-CM

## 2018-07-11 DIAGNOSIS — Z00.00 ROUTINE PHYSICAL EXAMINATION: Primary | ICD-10-CM

## 2018-07-11 DIAGNOSIS — Z23 ENCOUNTER FOR IMMUNIZATION: ICD-10-CM

## 2018-07-11 DIAGNOSIS — Z13.820 OSTEOPOROSIS SCREENING: ICD-10-CM

## 2018-07-11 DIAGNOSIS — F41.1 GAD (GENERALIZED ANXIETY DISORDER): ICD-10-CM

## 2018-07-11 DIAGNOSIS — I10 ESSENTIAL HYPERTENSION, BENIGN: ICD-10-CM

## 2018-07-11 DIAGNOSIS — E78.00 PURE HYPERCHOLESTEROLEMIA: ICD-10-CM

## 2018-07-11 DIAGNOSIS — Z12.39 BREAST CANCER SCREENING: ICD-10-CM

## 2018-07-11 DIAGNOSIS — Z71.89 ACP (ADVANCE CARE PLANNING): ICD-10-CM

## 2018-07-11 NOTE — ACP (ADVANCE CARE PLANNING)
Advance Care Planning (ACP) Provider Note - Comprehensive     Date of ACP Conversation: 07/11/18  Persons included in Conversation:  patient  Length of ACP Conversation in minutes: <16 minutes (Non-Billable)    Authorized Decision Maker (if patient is incapable of making informed decisions): This person is: Other Legally Authorized Decision Maker (e.g. Next of Kin)      She has NO advanced directive  - add't info provided. Reviewed DNR/DNI and patient is not interested. General ACP for ALL Patients with Decision Making Capacity:  Importance of advance care planning, including choosing a healthcare agent to communicate patient's healthcare decisions if patient lost the ability to make decisions, such as after a sudden illness or accident  Understanding of the healthcare agent role was assessed and information provided  Opportunity offered to explore how cultural, Gnosticism, spiritual, or personal beliefs would affect decisions for future care  Exploration of values, goals, and preferences if recovery is not expected, even with continued medical treatment in the event of: Imminent death or severe, permanent brain injury    For Serious or Chronic Illness:  Understanding of CPR, goals and expected outcomes, benefits and burdens discussed.   Understanding of medical condition  Information on CPR success rates provided (e.g. for CPR in hospital, survival to d/c at two weeks is 22%, for chronically ill or elderly/frail survival is less than 3%)    Interventions Provided:  Recommended communicating the plan and making copies for the healthcare agent, personal physician, and others as appropriate (e.g., health system)  Recommended review of completed ACP document annually or upon change in health status

## 2018-07-11 NOTE — PROGRESS NOTES
Shaun Abreu is a 68 y.o. female who was seen in clinic today (7/11/2018) for a full physical.        Assessment & Plan:   Diagnoses and all orders for this visit:    1. Routine physical examination  -     METABOLIC PANEL, COMPREHENSIVE  -     CBC W/O DIFF  -     LIPID PANEL    2. Encounter for immunization  -     varicella-zoster recombinant, PF, (SHINGRIX, PF,) 50 mcg/0.5 mL susr injection; 0.5 ml IM once and then repeat in 2-6 months. -     pneumococcal 13 melvin conj dip (PREVNAR-13) 0.5 mL syrg injection; 0.5 mL by IntraMUSCular route once for 1 dose. 3. ACP (advance care planning)  -     FULL CODE    4. Osteoporosis screening- overdue, reviewed rational for screening  -     DEXA BONE DENSITY STUDY AXIAL; Future    5. Breast cancer screening- overdue, reports she will get it done this time  -     MICK MAMMO BI SCREENING INCL CAD; Future    6. Essential hypertension, benign- elevated currently, well controled at home, continue current meds pending review of labs    7. Pure hypercholesterolemia- not ideally controlled, on red yeast rice, will repeat labs    8. DIONI (generalized anxiety disorder)- stable, only using meds prn for travel, [place]  reviewed     9. Acute meniscal tear of right knee, subsequent encounter- improved, ortho notes reviewed, agree w/ conservative treatment, reviewed expectations. Will defer to specialist     10. Obesity, morbid ([address]- improved, poorly controlled, needs improvement, I have reviewed/discussed the above normal BMI with the patient. I have recommended the following interventions: encourage exercise and lifestyle education regarding diet. Needs to focus on diet as knee is limiting activity. Reviewed weight bearing vs non-weight bearing exercises. Follow-up Disposition:  Return in about 1 year (around 7/11/2019) for FULL PHYSICAL - 30 minutes.         ------------------------------------------------------------------------------------------    Subjective:   Routine Physical Exam    End of Life Planning: This was discussed with her today and she has NO advanced directive  - add't info provided. Reviewed DNR/DNI and patient is not interested. Depression Screen:  PHQ over the last two weeks 7/11/2018   Little interest or pleasure in doing things Not at all   Feeling down, depressed or hopeless Not at all   Total Score PHQ 2 0         Fall Risk:   Fall Risk Assessment, last 12 mths 7/11/2018   Able to walk? Yes   Fall in past 12 months? No       Abuse Screen:  Abuse Screening Questionnaire 7/11/2018   Do you ever feel afraid of your partner? N   Are you in a relationship with someone who physically or mentally threatens you? N   Is it safe for you to go home? Y         Alcohol Risk Factor Screening: On any occasion during the past 3 months, have you had more than 3 drinks containing alcohol? No  Do you average more than 7 drinks per week? No    Health Maintenance  Immunizations:    Influenza: she will plan on getting it this fall. Tetanus: up to date. Shingles: due for Shingrix - script provided. Pneumonia: due for Prevnar & script provided. Cancer screening:    Cervical: reviewed guidelines, n/a. Breast: reviewed guidelines, reviewed SBE with her, UTD. Colon: she will get records, she reports is UTD, guidelines reviewed. Patient Care Team:  Cornelia Gu MD as PCP - General (Internal Medicine)  Marina Clayton MD as Physician (Ophthalmology)  Alta Mclean MD as Physician (Gastroenterology)       In addition to the above issues we also reviewed the following acute and/or chronic problems:    Cardiovascular Review  The patient has hypertension, hyperlipidemia and obesity. Since last visit: ortho told her not to run/walk due to knee pain. She reports taking medications as instructed, no medication side effects noted, home BP monitoring in range of 428'C systolic over 98'N diastolic.   Diet and Lifestyle: generally follows a low fat low cholesterol diet, generally follows a low sodium diet, sedentary. Labs: reviewed and discussed with patient. The following sections were reviewed & updated as appropriate: PMH, PSH, FH, and SH. Patient Active Problem List   Diagnosis Code    Essential hypertension, benign I10    Allergic rhinitis J30.9    Osteoarthritis M19.90    DIONI (generalized anxiety disorder) F41.1    Vitamin D deficiency E55.9    Hyperlipidemia E78.5    Gastroesophageal reflux disease without esophagitis K21.9    Obesity, morbid ([facility] E66.01    Acute meniscal tear of right knee S83.206A          Prior to Admission medications    Medication Sig Start Date End Date Taking? Authorizing Provider   tiZANidine (ZANAFLEX) 2 mg tablet TAKE 1 TABLET BY MOUTH DAILY AS NEEDED. 7/2/18  Yes Skipper Hammans, MD   ALPRAZolam Keshav Lat) 0.25 mg tablet TAKE 1 TABLET BY MOUTH DAILY AS NEEDED FOR ANXIETY 6/14/18  Yes Skipper Hammans, MD   hydroCHLOROthiazide (HYDRODIURIL) 25 mg tablet TAKE 1 TABLET BY MOUTH DAILY. , LABS REQUIRED FOR ANY FURTHER REFILLS 5/20/18  Yes Skipper Hammans, MD   mometasone (NASONEX) 50 mcg/actuation nasal spray 2 SPRAYS BY BOTH NOSTRILS ROUTE AS NEEDED. 3/8/18  Yes Skipper Hammans, MD   potassium chloride (KLOR-CON) 10 mEq tablet Take 1 Tab by mouth daily. 1/9/18  Yes Skipper Hammans, MD   red yeast rice 600 mg tab Take 1 Tab by mouth two (2) times a day. 10/22/15  Yes Skipper Hammans, MD   acetaminophen (TYLENOL EXTRA STRENGTH) 500 mg tablet Take 500 mg by mouth as needed for Pain. Yes Historical Provider   lactobacillus rhamnosus gg 10 billion cell (CULTURELLE) 10 billion cell capsule Take 1 capsule by mouth daily. Yes Historical Provider   cholecalciferol, vitamin D3, (VITAMIN D3) 2,000 unit tab Take 1 tablet by mouth daily. Yes Historical Provider   aspirin 81 mg tablet Take 81 mg by mouth daily.    Yes Historical Provider          Allergies   Allergen Reactions    Diovan [Valsartan] Other (comments)     headaches    Lisinopril Other (comments)     headaches    Pcn [Penicillins] Rash    Statins-Hmg-Coa Reductase Inhibitors Myalgia            Review of Systems   Constitutional: Negative for chills and fever. Eyes:        Watery R eyes, only in the AM, not having any issues during the day   Respiratory: Negative for cough and shortness of breath. Cardiovascular: Negative for chest pain and palpitations. Gastrointestinal: Negative for abdominal pain, blood in stool, constipation, diarrhea, heartburn, nausea and vomiting. Musculoskeletal: Positive for joint pain (R leg). Negative for myalgias. Skin:        Spot on the tip of the nose   Neurological: Negative for tingling, focal weakness and headaches. Endo/Heme/Allergies: Does not bruise/bleed easily. Psychiatric/Behavioral: Negative for depression. The patient is not nervous/anxious and does not have insomnia. Objective:   Physical Exam   Constitutional: She appears well-developed. No distress. obese   HENT:   Left Ear: Tympanic membrane, external ear and ear canal normal.   Nose: Nose normal.   Mouth/Throat: Uvula is midline, oropharynx is clear and moist and mucous membranes are normal. No posterior oropharyngeal erythema. Right ear is: 75% occluded with hard cerumen. Eyes: Conjunctivae and lids are normal. No scleral icterus. Neck: Neck supple. Carotid bruit is not present. No thyromegaly present. Cardiovascular: Regular rhythm and normal heart sounds. No murmur heard. Pulses:       Dorsalis pedis pulses are 2+ on the right side, and 2+ on the left side. Posterior tibial pulses are 2+ on the right side, and 2+ on the left side. Pulmonary/Chest: Effort normal and breath sounds normal. She has no wheezes. She has no rales. Abdominal: Soft. Bowel sounds are normal. She exhibits no mass. There is no hepatosplenomegaly. There is no tenderness. Musculoskeletal: Normal range of motion.  She exhibits no edema. Cervical back: Normal.        Thoracic back: She exhibits no bony tenderness. Lumbar back: Normal.   Lymphadenopathy:     She has no cervical adenopathy. Neurological: She has normal strength. No sensory deficit. Skin: No rash noted. Psychiatric: She has a normal mood and affect. Her behavior is normal.          Visit Vitals    /80    Pulse 74    Temp 97.8 °F (36.6 °C) (Oral)    Resp 14    Ht 5' 2.8\" (1.595 m)    Wt 230 lb (104.3 kg)    SpO2 95%    BMI 41 kg/m2          Advised her to call back or return to office if symptoms worsen/change/persist.  Discussed expected course/resolution/complications of diagnosis in detail with patient. Medication risks/benefits/costs/interactions/alternatives discussed with patient. She was given an after visit summary which includes diagnoses, current medications, & vitals. She expressed understanding with the diagnosis and plan. Aspects of this note may have been generated using voice recognition software. Despite editing, there may be some syntax errors.        Baldo Ayers MD

## 2018-07-11 NOTE — PATIENT INSTRUCTIONS

## 2018-07-12 LAB
ALBUMIN SERPL-MCNC: 4.3 G/DL (ref 3.5–4.8)
ALBUMIN/GLOB SERPL: 1.8 {RATIO} (ref 1.2–2.2)
ALP SERPL-CCNC: 74 IU/L (ref 39–117)
ALT SERPL-CCNC: 18 IU/L (ref 0–32)
AST SERPL-CCNC: 18 IU/L (ref 0–40)
BILIRUB SERPL-MCNC: 0.7 MG/DL (ref 0–1.2)
BUN SERPL-MCNC: 22 MG/DL (ref 8–27)
BUN/CREAT SERPL: 30 (ref 12–28)
CALCIUM SERPL-MCNC: 9.8 MG/DL (ref 8.7–10.3)
CHLORIDE SERPL-SCNC: 96 MMOL/L (ref 96–106)
CHOLEST SERPL-MCNC: 202 MG/DL (ref 100–199)
CO2 SERPL-SCNC: 27 MMOL/L (ref 20–29)
CREAT SERPL-MCNC: 0.73 MG/DL (ref 0.57–1)
ERYTHROCYTE [DISTWIDTH] IN BLOOD BY AUTOMATED COUNT: 13.3 % (ref 12.3–15.4)
GLOBULIN SER CALC-MCNC: 2.4 G/DL (ref 1.5–4.5)
GLUCOSE SERPL-MCNC: 99 MG/DL (ref 65–99)
HCT VFR BLD AUTO: 41 % (ref 34–46.6)
HDLC SERPL-MCNC: 55 MG/DL
HGB BLD-MCNC: 14.1 G/DL (ref 11.1–15.9)
LDLC SERPL CALC-MCNC: 128 MG/DL (ref 0–99)
MCH RBC QN AUTO: 31.3 PG (ref 26.6–33)
MCHC RBC AUTO-ENTMCNC: 34.4 G/DL (ref 31.5–35.7)
MCV RBC AUTO: 91 FL (ref 79–97)
PLATELET # BLD AUTO: 224 X10E3/UL (ref 150–379)
POTASSIUM SERPL-SCNC: 3.2 MMOL/L (ref 3.5–5.2)
PROT SERPL-MCNC: 6.7 G/DL (ref 6–8.5)
RBC # BLD AUTO: 4.5 X10E6/UL (ref 3.77–5.28)
SODIUM SERPL-SCNC: 142 MMOL/L (ref 134–144)
TRIGL SERPL-MCNC: 94 MG/DL (ref 0–149)
VLDLC SERPL CALC-MCNC: 19 MG/DL (ref 5–40)
WBC # BLD AUTO: 4.4 X10E3/UL (ref 3.4–10.8)

## 2018-07-12 NOTE — PROGRESS NOTES
Results released to patient via [employer]. All labs are stable or at goal for her, except for low K (increase KCl). Lipids improved (cont RYR but will offer statin).

## 2018-08-18 DIAGNOSIS — I10 ESSENTIAL HYPERTENSION, BENIGN: ICD-10-CM

## 2018-08-18 RX ORDER — HYDROCHLOROTHIAZIDE 25 MG/1
25 TABLET ORAL DAILY
Qty: 90 TAB | Refills: 0 | Status: SHIPPED | OUTPATIENT
Start: 2018-08-18 | End: 2018-11-12 | Stop reason: SDUPTHER

## 2018-09-05 DIAGNOSIS — F41.9 ANXIETY: ICD-10-CM

## 2018-09-06 RX ORDER — ALPRAZOLAM 0.25 MG/1
TABLET ORAL
Qty: 10 TAB | Refills: 0 | OUTPATIENT
Start: 2018-09-06 | End: 2019-02-14 | Stop reason: SDUPTHER

## 2018-09-06 NOTE — TELEPHONE ENCOUNTER
Chart & VA  reviewed, normally 10 tabs lasts her 6 months, has only been 3 months. Needs to monitor usage, if continues will need f/u. Please call in medication and notify pt.

## 2018-11-12 DIAGNOSIS — I10 ESSENTIAL HYPERTENSION, BENIGN: ICD-10-CM

## 2018-11-12 RX ORDER — HYDROCHLOROTHIAZIDE 25 MG/1
TABLET ORAL
Qty: 90 TAB | Refills: 1 | Status: SHIPPED | OUTPATIENT
Start: 2018-11-12 | End: 2019-05-03 | Stop reason: SDUPTHER

## 2019-02-14 DIAGNOSIS — F41.9 ANXIETY: ICD-10-CM

## 2019-02-15 RX ORDER — ALPRAZOLAM 0.25 MG/1
TABLET ORAL
Qty: 10 TAB | Refills: 0 | OUTPATIENT
Start: 2019-02-15 | End: 2020-01-03 | Stop reason: SDUPTHER

## 2019-02-15 NOTE — TELEPHONE ENCOUNTER
Verbal order per Dr. Adenike Gardner for refill Alprazolam called to local pharmacy, [facility].

## 2019-05-03 DIAGNOSIS — I10 ESSENTIAL HYPERTENSION, BENIGN: ICD-10-CM

## 2019-05-03 RX ORDER — HYDROCHLOROTHIAZIDE 25 MG/1
TABLET ORAL
Qty: 90 TAB | Refills: 0 | Status: SHIPPED | OUTPATIENT
Start: 2019-05-03 | End: 2019-08-08 | Stop reason: SDUPTHER

## 2019-08-08 DIAGNOSIS — I10 ESSENTIAL HYPERTENSION, BENIGN: ICD-10-CM

## 2019-08-08 RX ORDER — HYDROCHLOROTHIAZIDE 25 MG/1
TABLET ORAL
Qty: 90 TAB | Refills: 0 | Status: SHIPPED | OUTPATIENT
Start: 2019-08-08 | End: 2019-12-16 | Stop reason: SDUPTHER

## 2019-08-08 NOTE — TELEPHONE ENCOUNTER
Call to patient, advised needs to schedule an appointment for f/u with Dr. Marcella Mtaias. [place] is in [place] right now, will call when she gets back in town to schedule when she has her calendar.

## 2019-12-16 DIAGNOSIS — I10 ESSENTIAL HYPERTENSION, BENIGN: ICD-10-CM

## 2019-12-16 RX ORDER — HYDROCHLOROTHIAZIDE 25 MG/1
TABLET ORAL
Qty: 20 TAB | Refills: 0 | Status: SHIPPED | OUTPATIENT
Start: 2019-12-16 | End: 2019-12-31 | Stop reason: SDUPTHER

## 2019-12-31 ENCOUNTER — OFFICE VISIT (OUTPATIENT)
Dept: INTERNAL MEDICINE CLINIC | Age: 74
End: 2019-12-31

## 2019-12-31 VITALS
HEART RATE: 66 BPM | TEMPERATURE: 98.3 F | SYSTOLIC BLOOD PRESSURE: 142 MMHG | BODY MASS INDEX: 40.4 KG/M2 | DIASTOLIC BLOOD PRESSURE: 76 MMHG | RESPIRATION RATE: 18 BRPM | HEIGHT: 63 IN | WEIGHT: 228 LBS | OXYGEN SATURATION: 96 %

## 2019-12-31 DIAGNOSIS — E66.01 OBESITY, MORBID (HCC): ICD-10-CM

## 2019-12-31 DIAGNOSIS — M79.604 LEG PAIN, RIGHT: ICD-10-CM

## 2019-12-31 DIAGNOSIS — M17.12 PRIMARY OSTEOARTHRITIS OF LEFT KNEE: ICD-10-CM

## 2019-12-31 DIAGNOSIS — E78.00 PURE HYPERCHOLESTEROLEMIA: ICD-10-CM

## 2019-12-31 DIAGNOSIS — F41.1 GAD (GENERALIZED ANXIETY DISORDER): ICD-10-CM

## 2019-12-31 DIAGNOSIS — I10 ESSENTIAL HYPERTENSION, BENIGN: Primary | ICD-10-CM

## 2019-12-31 DIAGNOSIS — J30.1 NON-SEASONAL ALLERGIC RHINITIS DUE TO POLLEN: ICD-10-CM

## 2019-12-31 RX ORDER — HYDROCHLOROTHIAZIDE 25 MG/1
TABLET ORAL
Qty: 90 TAB | Refills: 0 | Status: SHIPPED | OUTPATIENT
Start: 2019-12-31 | End: 2020-09-22

## 2019-12-31 RX ORDER — TIZANIDINE 2 MG/1
TABLET ORAL
Qty: 10 TAB | Refills: 1 | Status: SHIPPED | OUTPATIENT
Start: 2019-12-31 | End: 2020-09-22 | Stop reason: SDUPTHER

## 2019-12-31 NOTE — PROGRESS NOTES
Rico Lim is a 69 y.o. female who was seen in clinic today (12/31/2019). Last seen in clinic in July '18. Assessment & Plan:     Diagnoses and all orders for this visit:    1. Essential hypertension, benign- improved at home and at goal at home, no changes, will continue current medications pending review of labs. -     hydroCHLOROthiazide (HYDRODIURIL) 25 mg tablet; TAKE 1 TABLET BY MOUTH EVERY DAY.  -     METABOLIC PANEL, COMPREHENSIVE  -     CBC W/O DIFF    2. Pure hypercholesterolemia- previously well controlled, continue current treatment pending review of labs   -     METABOLIC PANEL, COMPREHENSIVE  -     LIPID PANEL    3. Obesity, morbid ([facility] [address]- improved, congratulated, and still needs improvement. I have recommended the following interventions: encourage exercise and lifestyle education regarding diet. Reviewed if knee pain is limited her exercise then she needs to consider TKR. 4. DIONI (generalized anxiety disorder)- stable and well controlled, I reviewed treatment options with her, I reviewed life style changes to help improve mood. Will defer refilling xanax at this time. Continue w/ life style changes. 5. Leg pain, right- on/off issue, sounds muscular due to OA pain and compensation, will refill to use prn. See below  -     tiZANidine (ZANAFLEX) 2 mg tablet; TAKE 1 TABLET BY MOUTH DAILY AS NEEDED. 6. Primary osteoarthritis of left knee- chronic issue, not improving, getting steroid injections every 3 months with decreasing benefit, she reports it is impacting her ability to exercise. My advise was to consider TKR if surgeon thinks it is time. At her age this would be a good time to get it done to get the most of it. 7. Non-seasonal allergic rhinitis due to pollen- slightly worse, not using meds regularly, reviewed nasal steroid vs OTC anti-histamines. Follow-up and Dispositions    · Return in about 6 months (around 6/30/2020) for FULL PHYSICAL - 30 minutes. Subjective: Michael Key was seen today for Anxiety; Hypertension; and Cholesterol Problem    Cardiovascular Review  The patient has hypertension, hyperlipidemia and obesity. Since last visit: weight has decreased. She reports taking medications as instructed, no medication side effects noted, home BP monitoring in range of 807'F systolic over 40'S diastolic. Diet and Lifestyle: generally follows a low fat low cholesterol diet, generally follows a low sodium diet, sedentary. She reports limited by her knee pain (getting injection from ortho). Labs: reviewed and discussed with patient. Health Maintenance Review  She is overdue for mammogram and DEXA screening. These have both been ordered in '14 and '19 but she never had completed. She reports having it done, she will get us information. She is overdue for colon cancer screening. She reports UTD but there are no records available. Mental Health Review  Patient is seen for anxiety. Since last visit: she has been living in [place] to help her daughter take care of her grandson (depression - SI/SA). GAD7 reviewed. Reports experiences the following side effects from the treatment: none. DIONI 2/7 12/31/2019   Feeling nervous, anxious or on edge? 2   Not being able to stop or control worrying? 2   DIONI-2 Subtotal 4   Worrying too much about different things? 0   Trouble relaxing? 0   Being so restless that it is hard to sit still? 0   Becoming easily annoyed or irritable? 0   Feeling afraid as if something awful might happen? 0   DIONI-7 Total Score 4   If you checked off any problems, how difficult have these problems made it for you to do your work, take care of thinks at home, or get along with other people?   Not at all difficult           Brief Labs:     Lab Results   Component Value Date/Time    Sodium 142 07/11/2018 10:38 AM    Potassium 3.2 07/11/2018 10:38 AM    Creatinine 0.73 07/11/2018 10:38 AM    Cholesterol, total 202 07/11/2018 10:38 AM    HDL Cholesterol 55 07/11/2018 10:38 AM    LDL, calculated 128 07/11/2018 10:38 AM    Triglyceride 94 07/11/2018 10:38 AM          Prior to Admission medications    Medication Sig Start Date End Date Taking? Authorizing Provider   hydroCHLOROthiazide (HYDRODIURIL) 25 mg tablet TAKE 1 TABLET BY MOUTH EVERY DAY. Labs & appointment required prior to any further refills 12/16/19  Yes Razia Pritchett MD   ALPRAZolam Elane Perks) 0.25 mg tablet TAKE 1 TABLET BY MOUTH EVERY DAY AS NEEDED FOR ANXIETY 2/15/19  Yes Razia Pritchett MD   mometasone (NASONEX) 50 mcg/actuation nasal spray 2 SPRAYS BY BOTH NOSTRILS ROUTE AS NEEDED. 3/8/18  Yes Razia Pritchett MD   varicella-zoster recombinant, PF, (SHINGRIX, PF,) 50 mcg/0.5 mL susr injection 0.5 ml IM once and then repeat in 2-6 months. 7/11/18   Razia Pritchett MD   tiZANidine (ZANAFLEX) 2 mg tablet TAKE 1 TABLET BY MOUTH DAILY AS NEEDED. 7/2/18   Razia Pritchett MD   potassium chloride (KLOR-CON) 10 mEq tablet Take 1 Tab by mouth daily. 1/9/18   Razia Pritchett MD   red yeast rice 600 mg tab Take 1 Tab by mouth two (2) times a day. 10/22/15   Razia Pritchett MD   acetaminophen (TYLENOL EXTRA STRENGTH) 500 mg tablet Take 500 mg by mouth as needed for Pain. Provider, Historical   lactobacillus rhamnosus gg 10 billion cell (CULTURELLE) 10 billion cell capsule Take 1 capsule by mouth daily. Provider, Historical   cholecalciferol, vitamin D3, (VITAMIN D3) 2,000 unit tab Take 1 tablet by mouth daily. Provider, Historical   aspirin 81 mg tablet Take 81 mg by mouth daily. Provider, Historical          Allergies   Allergen Reactions    Diovan [Valsartan] Other (comments)     headaches    Lisinopril Other (comments)     headaches    Pcn [Penicillins] Rash    Statins-Hmg-Coa Reductase Inhibitors Myalgia           Review of Systems   Constitutional: Negative for malaise/fatigue and weight loss.    Respiratory: Negative for cough and shortness of breath. Cardiovascular: Negative for chest pain, palpitations and leg swelling. Gastrointestinal: Negative for abdominal pain, constipation, diarrhea, heartburn, nausea and vomiting. Genitourinary: Negative for frequency. Musculoskeletal: Positive for joint pain (R knee) and myalgias (R leg, she attributes to her knee). Skin: Negative for rash. Neurological: Negative for tingling, sensory change, focal weakness and headaches. Psychiatric/Behavioral: Negative for depression. The patient is not nervous/anxious and does not have insomnia. Objective:   Physical Exam  Constitutional:       General: She is not in acute distress. Appearance: Normal appearance. She is obese. HENT:      Right Ear: Ear canal normal. No middle ear effusion. Tympanic membrane is not erythematous. Left Ear: Ear canal normal.  No middle ear effusion. Tympanic membrane is not erythematous. Nose: No congestion or rhinorrhea. Right Turbinates: Not swollen. Left Turbinates: Not swollen. Right Sinus: No maxillary sinus tenderness or frontal sinus tenderness. Left Sinus: No maxillary sinus tenderness or frontal sinus tenderness. Mouth/Throat:      Mouth: Mucous membranes are moist.      Pharynx: No oropharyngeal exudate or posterior oropharyngeal erythema. Eyes:      Conjunctiva/sclera: Conjunctivae normal.   Cardiovascular:      Rate and Rhythm: Regular rhythm. Heart sounds: No murmur. Pulmonary:      Effort: Pulmonary effort is normal.      Breath sounds: Normal breath sounds. No decreased breath sounds or wheezing. Abdominal:      General: Bowel sounds are normal.      Palpations: Abdomen is soft. Tenderness: There is no tenderness. Musculoskeletal:      Right lower leg: No edema. Left lower leg: No edema. Lymphadenopathy:      Cervical: No cervical adenopathy.    Psychiatric:         Mood and Affect: Mood and affect normal.         Behavior: Behavior normal. Visit Vitals  /76   Pulse 66   Temp 98.3 °F (36.8 °C) (Oral)   Resp 18   Ht 5' 2.8\" (1.595 m)   Wt 228 lb (103.4 kg)   SpO2 96%   BMI 40.65 kg/m²         Disclaimer:  Advised her to call back or return to office if symptoms worsen/change/persist.  Discussed expected course/resolution/complications of diagnosis in detail with patient. Medication risks/benefits/costs/interactions/alternatives discussed with patient. She was given an after visit summary which includes diagnoses, current medications, & vitals. She expressed understanding with the diagnosis and plan. Aspects of this note may have been generated using voice recognition software. Despite editing, there may be some syntax errors.        Kei Fierro MD

## 2019-12-31 NOTE — ACP (ADVANCE CARE PLANNING)
Advance Care Planning (ACP) Provider Note - Comprehensive     Date of ACP Conversation: 12/31/19  Persons included in Conversation:  patient  Length of ACP Conversation in minutes: <16 minutes (Non-Billable)    Authorized Decision Maker (if patient is incapable of making informed decisions):   Healthcare Agent/Medical Power of  under Advance Directive      She has an advanced directive - a copy HAS NOT been provided. Reviewed DNR/DNI and patient is not interested. General ACP for ALL Patients with Decision Making Capacity:  Importance of advance care planning, including choosing a healthcare agent to communicate patient's healthcare decisions if patient lost the ability to make decisions, such as after a sudden illness or accident  Understanding of the healthcare agent role was assessed and information provided  Opportunity offered to explore how cultural, Bahai, spiritual, or personal beliefs would affect decisions for future care  Exploration of values, goals, and preferences if recovery is not expected, even with continued medical treatment in the event of: Imminent death or severe, permanent brain injury    For Serious or Chronic Illness:  Understanding of CPR, goals and expected outcomes, benefits and burdens discussed.   Understanding of medical condition  Information on CPR success rates provided (e.g. for CPR in hospital, survival to d/c at two weeks is 22%, for chronically ill or elderly/frail survival is less than 3%)    Interventions Provided:  Recommended communicating the plan and making copies for the healthcare agent, personal physician, and others as appropriate (e.g., health system)  Recommended review of completed ACP document annually or upon change in health status

## 2020-01-01 LAB
ALBUMIN SERPL-MCNC: 4.1 G/DL (ref 3.5–4.8)
ALBUMIN/GLOB SERPL: 1.9 {RATIO} (ref 1.2–2.2)
ALP SERPL-CCNC: 80 IU/L (ref 39–117)
ALT SERPL-CCNC: 15 IU/L (ref 0–32)
AST SERPL-CCNC: 12 IU/L (ref 0–40)
BILIRUB SERPL-MCNC: 0.7 MG/DL (ref 0–1.2)
BUN SERPL-MCNC: 25 MG/DL (ref 8–27)
BUN/CREAT SERPL: 34 (ref 12–28)
CALCIUM SERPL-MCNC: 9.7 MG/DL (ref 8.7–10.3)
CHLORIDE SERPL-SCNC: 97 MMOL/L (ref 96–106)
CHOLEST SERPL-MCNC: 214 MG/DL (ref 100–199)
CO2 SERPL-SCNC: 28 MMOL/L (ref 20–29)
CREAT SERPL-MCNC: 0.74 MG/DL (ref 0.57–1)
ERYTHROCYTE [DISTWIDTH] IN BLOOD BY AUTOMATED COUNT: 13.1 % (ref 12.3–15.4)
GLOBULIN SER CALC-MCNC: 2.2 G/DL (ref 1.5–4.5)
GLUCOSE SERPL-MCNC: 92 MG/DL (ref 65–99)
HCT VFR BLD AUTO: 41.7 % (ref 34–46.6)
HDLC SERPL-MCNC: 63 MG/DL
HGB BLD-MCNC: 14.4 G/DL (ref 11.1–15.9)
LDLC SERPL CALC-MCNC: 132 MG/DL (ref 0–99)
MCH RBC QN AUTO: 31.4 PG (ref 26.6–33)
MCHC RBC AUTO-ENTMCNC: 34.5 G/DL (ref 31.5–35.7)
MCV RBC AUTO: 91 FL (ref 79–97)
PLATELET # BLD AUTO: 218 X10E3/UL (ref 150–450)
POTASSIUM SERPL-SCNC: 2.9 MMOL/L (ref 3.5–5.2)
PROT SERPL-MCNC: 6.3 G/DL (ref 6–8.5)
RBC # BLD AUTO: 4.58 X10E6/UL (ref 3.77–5.28)
SODIUM SERPL-SCNC: 141 MMOL/L (ref 134–144)
TRIGL SERPL-MCNC: 94 MG/DL (ref 0–149)
VLDLC SERPL CALC-MCNC: 19 MG/DL (ref 5–40)
WBC # BLD AUTO: 7.1 X10E3/UL (ref 3.4–10.8)

## 2020-01-02 DIAGNOSIS — E78.00 PURE HYPERCHOLESTEROLEMIA: ICD-10-CM

## 2020-01-02 DIAGNOSIS — I10 ESSENTIAL HYPERTENSION, BENIGN: Primary | ICD-10-CM

## 2020-01-02 RX ORDER — POTASSIUM CHLORIDE 750 MG/1
10 TABLET, EXTENDED RELEASE ORAL DAILY
Qty: 90 TAB | Refills: 1 | Status: SHIPPED | OUTPATIENT
Start: 2020-01-02 | End: 2020-06-24

## 2020-01-02 RX ORDER — SPIRONOLACTONE 25 MG/1
25 TABLET ORAL DAILY
Qty: 90 TAB | Refills: 0 | Status: SHIPPED | OUTPATIENT
Start: 2020-01-02 | End: 2020-03-21

## 2020-01-02 RX ORDER — EZETIMIBE 10 MG/1
10 TABLET ORAL DAILY
Qty: 90 TAB | Refills: 0 | Status: SHIPPED | OUTPATIENT
Start: 2020-01-02 | End: 2020-09-22

## 2020-01-03 DIAGNOSIS — F41.9 ANXIETY: ICD-10-CM

## 2020-01-03 NOTE — PROGRESS NOTES
Results released to patient via [employer]. All labs are stable or at goal for her, except for persistent low K.  10 yr CVD risk 20%. Will start on spironolactone.

## 2020-01-05 RX ORDER — ALPRAZOLAM 0.25 MG/1
0.25 TABLET ORAL
Qty: 10 TAB | Refills: 0 | Status: SHIPPED | OUTPATIENT
Start: 2020-01-05 | End: 2020-11-13 | Stop reason: SDUPTHER

## 2020-03-06 ENCOUNTER — TELEPHONE (OUTPATIENT)
Dept: INTERNAL MEDICINE CLINIC | Age: 75
End: 2020-03-06

## 2020-03-06 DIAGNOSIS — E87.6 LOW BLOOD POTASSIUM: Primary | ICD-10-CM

## 2020-03-06 DIAGNOSIS — Z79.899 MEDICATION MANAGEMENT: ICD-10-CM

## 2020-03-06 NOTE — TELEPHONE ENCOUNTER
Verified patient identity with two identifiers. Spoke with patient by phone. Pt needs repeat BMP will leave lab slip at , she will also bring name/place of where she had mammo and dexa so we can request records. Lab slip at . Patient verbalized understanding.

## 2020-03-21 DIAGNOSIS — I10 ESSENTIAL HYPERTENSION, BENIGN: ICD-10-CM

## 2020-03-21 RX ORDER — SPIRONOLACTONE 25 MG/1
TABLET ORAL
Qty: 90 TAB | Refills: 0 | Status: SHIPPED | OUTPATIENT
Start: 2020-03-21 | End: 2020-06-17

## 2020-06-17 DIAGNOSIS — I10 ESSENTIAL HYPERTENSION, BENIGN: ICD-10-CM

## 2020-06-17 RX ORDER — SPIRONOLACTONE 25 MG/1
TABLET ORAL
Qty: 90 TAB | Refills: 0 | Status: SHIPPED | OUTPATIENT
Start: 2020-06-17 | End: 2020-09-09

## 2020-06-24 RX ORDER — POTASSIUM CHLORIDE 750 MG/1
TABLET, FILM COATED, EXTENDED RELEASE ORAL
Qty: 30 TAB | Refills: 0 | Status: SHIPPED | OUTPATIENT
Start: 2020-06-24 | End: 2020-07-22

## 2020-06-29 ENCOUNTER — E-VISIT (OUTPATIENT)
Dept: INTERNAL MEDICINE CLINIC | Age: 75
End: 2020-06-29

## 2020-06-29 DIAGNOSIS — L03.115 CELLULITIS OF RIGHT LOWER EXTREMITY: Primary | ICD-10-CM

## 2020-06-29 RX ORDER — CEPHALEXIN 500 MG/1
500 CAPSULE ORAL 3 TIMES DAILY
Qty: 21 CAP | Refills: 0 | Status: SHIPPED | OUTPATIENT
Start: 2020-06-29 | End: 2020-07-06

## 2020-06-30 ENCOUNTER — TELEPHONE (OUTPATIENT)
Dept: INTERNAL MEDICINE CLINIC | Age: 75
End: 2020-06-30

## 2020-06-30 NOTE — TELEPHONE ENCOUNTER
Call to patient, identified with 2 identifiers. Advised of Dr. Eloy Jo' note and recommendations. She will call back to schedule. Says she had read the message.

## 2020-06-30 NOTE — TELEPHONE ENCOUNTER
E-Visit Note:    HPI: per patient questionnaire, this has been reviewed  EXAM: n/a    ----------------------------------  Diagnoses and all orders for this visit:    1. Cellulitis of right lower extremity  -     cephALEXin (KEFLEX) 500 mg capsule; Take 1 Cap by mouth three (3) times daily for 7 days. PLAN:   Not sure if inflammation or infection. Will recommend steroid cream and meds above. Tolerated Ceftin recently. PCN allergy is a rash.    ----------------------------------  The patient was advised to call if these symptoms worsen or fail to improve as anticipated. 5-10 minutes were spent on the digital evaluation and management of this patient.        Lauren Lopez MD

## 2020-07-22 RX ORDER — POTASSIUM CHLORIDE 750 MG/1
TABLET, FILM COATED, EXTENDED RELEASE ORAL
Qty: 10 TAB | Refills: 0 | Status: SHIPPED | OUTPATIENT
Start: 2020-07-22 | End: 2021-05-17 | Stop reason: ALTCHOICE

## 2020-09-09 DIAGNOSIS — I10 ESSENTIAL HYPERTENSION, BENIGN: ICD-10-CM

## 2020-09-09 RX ORDER — SPIRONOLACTONE 25 MG/1
TABLET ORAL
Qty: 30 TAB | Refills: 0 | Status: SHIPPED | OUTPATIENT
Start: 2020-09-09 | End: 2020-10-04

## 2020-09-10 NOTE — TELEPHONE ENCOUNTER
Future Appointments   Date Time Provider Brandi Ray   9/22/2020  8:00 AM Sukh Elam MD [facility]      Overdue for f/u and labs, will not sent in 90 days, only 30 days sent in.

## 2020-09-22 ENCOUNTER — OFFICE VISIT (OUTPATIENT)
Dept: INTERNAL MEDICINE CLINIC | Age: 75
End: 2020-09-22
Payer: COMMERCIAL

## 2020-09-22 VITALS
HEART RATE: 60 BPM | WEIGHT: 191 LBS | HEIGHT: 63 IN | DIASTOLIC BLOOD PRESSURE: 82 MMHG | SYSTOLIC BLOOD PRESSURE: 136 MMHG | TEMPERATURE: 97.5 F | RESPIRATION RATE: 16 BRPM | OXYGEN SATURATION: 99 % | BODY MASS INDEX: 33.84 KG/M2

## 2020-09-22 DIAGNOSIS — I10 ESSENTIAL HYPERTENSION, BENIGN: Primary | ICD-10-CM

## 2020-09-22 DIAGNOSIS — E66.9 OBESITY (BMI 30.0-34.9): ICD-10-CM

## 2020-09-22 DIAGNOSIS — F41.9 ANXIETY: ICD-10-CM

## 2020-09-22 DIAGNOSIS — J30.1 ALLERGIC RHINITIS DUE TO POLLEN: ICD-10-CM

## 2020-09-22 DIAGNOSIS — M79.10 MYALGIA: ICD-10-CM

## 2020-09-22 DIAGNOSIS — Z23 ENCOUNTER FOR IMMUNIZATION: ICD-10-CM

## 2020-09-22 DIAGNOSIS — E78.00 PURE HYPERCHOLESTEROLEMIA: ICD-10-CM

## 2020-09-22 DIAGNOSIS — Z12.11 COLON CANCER SCREENING: ICD-10-CM

## 2020-09-22 PROBLEM — E66.01 OBESITY, MORBID (HCC): Status: RESOLVED | Noted: 2017-12-26 | Resolved: 2020-09-22

## 2020-09-22 PROCEDURE — 99214 OFFICE O/P EST MOD 30 MIN: CPT | Performed by: INTERNAL MEDICINE

## 2020-09-22 PROCEDURE — G0008 ADMIN INFLUENZA VIRUS VAC: HCPCS

## 2020-09-22 PROCEDURE — 90694 VACC AIIV4 NO PRSRV 0.5ML IM: CPT

## 2020-09-22 RX ORDER — MOMETASONE FUROATE 50 UG/1
SPRAY, METERED NASAL
Qty: 1 CONTAINER | Refills: 2 | Status: SHIPPED | OUTPATIENT
Start: 2020-09-22 | End: 2021-05-17 | Stop reason: ALTCHOICE

## 2020-09-22 RX ORDER — ALPRAZOLAM 0.25 MG/1
0.25 TABLET ORAL
Qty: 10 TAB | Refills: 0 | Status: CANCELLED | OUTPATIENT
Start: 2020-09-22

## 2020-09-22 RX ORDER — TIZANIDINE 2 MG/1
TABLET ORAL
Qty: 10 TAB | Refills: 1 | Status: SHIPPED | OUTPATIENT
Start: 2020-09-22 | End: 2021-05-17 | Stop reason: ALTCHOICE

## 2020-09-22 NOTE — PROGRESS NOTES
Follow up    Malena De La Rosa  is a 77 y.o. @  who present for routine immunizations. Prior to vaccine administration: Consent was obtained. Risks and adverse reactions were discussed. The patient was provided the VIS and they were given an opportunity to ask questions; all questions were addressed. She  denies any symptoms, reactions or allergies that would exclude them from being immunized today. There were no adverse reactions observed post vaccination. Patient was advised to seek medical or call the office with any questions or concerns post vaccination. Patient verbalized understanding. Pratik Beaver RN       Colonoscopy fast tracked to Dr. Joe López, confirmation received.

## 2020-09-22 NOTE — PATIENT INSTRUCTIONS
Vaccine Information Statement    Influenza (Flu) Vaccine (Inactivated or Recombinant): What You Need to Know    Many Vaccine Information Statements are available in [place] and other languages. See www.immunize.org/vis  Hojas de información sobre vacunas están disponibles en español y en muchos otros idiomas. Visite www.immunize.org/vis    1. Why get vaccinated? Influenza vaccine can prevent influenza (flu). Flu is a contagious disease that spreads around the [facility] every year, usually between October and May. Anyone can get the flu, but it is more dangerous for some people. Infants and young children, people 72years of age and older, pregnant women, and people with certain health conditions or a weakened immune system are at greatest risk of flu complications. Pneumonia, bronchitis, sinus infections and ear infections are examples of flu-related complications. If you have a medical condition, such as heart disease, cancer or diabetes, flu can make it worse. Flu can cause fever and chills, sore throat, muscle aches, fatigue, cough, headache, and runny or stuffy nose. Some people may have vomiting and diarrhea, though this is more common in children than adults. Each year thousands of people in the [facility] die from flu, and many more are hospitalized. Flu vaccine prevents millions of illnesses and flu-related visits to the doctor each year. 2. Influenza vaccines     CDC recommends everyone 10months of age and older get vaccinated every flu season. Children 6 months through 6years of age may need 2 doses during a single flu season. Everyone else needs only 1 dose each flu season. It takes about 2 weeks for protection to develop after vaccination. There are many flu viruses, and they are always changing. Each year a new flu vaccine is made to protect against three or four viruses that are likely to cause disease in the upcoming flu season.  Even when the vaccine doesnt exactly match these viruses, it may still provide some protection. Influenza vaccine does not cause flu. Influenza vaccine may be given at the same time as other vaccines. 3. Talk with your health care provider    Tell your vaccine provider if the person getting the vaccine:  ; Has had an allergic reaction after a previous dose of influenza vaccine, or has any severe, life-threatening allergies.   ; Has ever had Guillain-Barré Syndrome (also called GBS). In some cases, your health care provider may decide to postpone influenza vaccination to a future visit. People with minor illnesses, such as a cold, may be vaccinated. People who are moderately or severely ill should usually wait until they recover before getting influenza vaccine. Your health care provider can give you more information. 4. Risks of a reaction    ; Soreness, redness, and swelling where shot is given, fever, muscle aches, and headache can happen after influenza vaccine.  ; There may be a very small increased risk of Guillain-Barré Syndrome (GBS) after inactivated influenza vaccine (the flu shot). Jessica Boop children who get the flu shot along with pneumococcal vaccine (PCV13), and/or DTaP vaccine at the same time might be slightly more likely to have a seizure caused by fever. Tell your health care provider if a child who is getting flu vaccine has ever had a seizure. People sometimes faint after medical procedures, including vaccination. Tell your provider if you feel dizzy or have vision changes or ringing in the ears. As with any medicine, there is a very remote chance of a vaccine causing a severe allergic reaction, other serious injury, or death. 5. What if there is a serious problem? An allergic reaction could occur after the vaccinated person leaves the clinic.  If you see signs of a severe allergic reaction (hives, swelling of the face and throat, difficulty breathing, a fast heartbeat, dizziness, or weakness), call 9-1-1 and get the person to the nearest hospital.    For other signs that concern you, call your health care provider. Adverse reactions should be reported to the Vaccine Adverse Event Reporting System (VAERS). Your health care provider will usually file this report, or you can do it yourself. Visit the VAERS website at www.vaers. [facility].gov or call 6-310.535.8627. VAERS is only for reporting reactions, and VAERS staff do not give medical advice. 6. The National Vaccine Injury Compensation Program    The [facility] Vaccine Injury Compensation Program (VICP) is a federal program that was created to compensate people who may have been injured by certain vaccines. Visit the VICP website at www.[facility].gov/vaccinecompensation or call 9-990.198.4112 to learn about the program and about filing a claim. There is a time limit to file a claim for compensation. 7. How can I learn more?    ; Ask your health care provider.   ; Call your local or state health department.  ; Contact the Centers for Disease Control and Prevention (CDC):  - Call 5-778.365.4345 (1-800-CDC-INFO) or  - Visit CDCs influenza website at www.cdc.gov/flu    Vaccine Information Statement (Interim)  Inactivated Influenza Vaccine   8/15/2019  42 JORGE ALBERTO. Sandi Courts [id number]     Department of Health and Human Services  Centers for Disease Control and Prevention    Office Use Only

## 2020-09-22 NOTE — PROGRESS NOTES
Ernesto Coughlin is a 77 y.o. female who was seen in clinic today (9/22/2020). Assessment & Plan:     Diagnoses and all orders for this visit:    1. Essential hypertension, benign- well controlled, continue current treatment pending review of labs   -     METABOLIC PANEL, COMPREHENSIVE  -     CBC WITH AUTOMATED DIFF    2. Obesity (BMI 30.0-34. 9)- new dx, out of morbid obesity range, congratulated, I have recommended the following interventions: encourage exercise and lifestyle education regarding diet. 3. Pure hypercholesterolemia- uncontrolled, never started zetia, is on RYR, reviewed 10 yr CVD risk, will check labs and will likely recommend zetia again.  -     METABOLIC PANEL, COMPREHENSIVE  -     LIPID PANEL    4. Anxiety- stable, continue current treatment, using meds prn, she reports having 4 of 10 left, given Jan '20, will refill when she has 2 tabs left. 5. Allergic rhinitis due to pollen- well controlled, continue current treatment   -     mometasone (NASONEX) 50 mcg/actuation nasal spray; 2 SPRAYS BY BOTH NOSTRILS ROUTE AS NEEDED. 6. Myalgia- stable, using as needed, reviewed the risk for side effects, will continue as long as intermittent. -     tiZANidine (ZANAFLEX) 2 mg tablet; TAKE 1 TABLET BY MOUTH DAILY AS NEEDED. 7. Encounter for immunization  -     FLU ([facility])    8. Colon cancer screening- overdue, she will contact GI to schedule  -     REFERRAL TO GASTROENTEROLOGY      Reviewed need for mammogram and DEXA. She declined. Have ordered DEXA x 3 and mammogram x 2 but never completed. She is aware of risk. Follow-up and Dispositions    · Return in about 6 months (around 3/22/2021) for Regular follow up. Subjective: Yaya Johnson was seen today for Hypertension; Cholesterol Problem; and Obesity        Since last visit: weight has decreased      Cardiovascular Review  The patient has hypertension, hyperlipidemia and obesity.   She reports taking medications as instructed, no medication side effects noted, home BP monitoring in range of 032'K systolic over 88'V diastolic. Diet and Lifestyle: generally follows a low fat low cholesterol diet, generally follows a low sodium diet, exercises regularly. Labs: reviewed and discussed with patient. She is down 40 lbs in the last year due to eating better and exercising more. Prior to Admission medications    Medication Sig Start Date End Date Taking? Authorizing Provider   spironolactone (ALDACTONE) 25 mg tablet TAKE 1 TABLET BY MOUTH EVERY DAY 9/9/20  Yes Karen Stanley MD   potassium chloride SR (KLOR-CON 10) 10 mEq tablet TAKE 1 TABLET BY MOUTH DAILY. APPOINTMENT REQUIRED PRIOR TO ANY FURTHER REFILLS 7/22/20  Yes Karen Stanley MD   ALPRAZolam Alesha Duster) 0.25 mg tablet Take 1 Tab by mouth daily as needed for Anxiety. 1/5/20  Yes Karen Stanley MD   tiZANidine (ZANAFLEX) 2 mg tablet TAKE 1 TABLET BY MOUTH DAILY AS NEEDED. 12/31/19  Yes Karen Stanley MD   mometasone (NASONEX) 50 mcg/actuation nasal spray 2 SPRAYS BY BOTH NOSTRILS ROUTE AS NEEDED. 3/8/18  Yes Karen Stanley MD   red yeast rice 600 mg tab Take 1 Tab by mouth two (2) times a day. 10/22/15  Yes Karen Stanley MD   lactobacillus rhamnosus gg 10 billion cell (CULTURELLE) 10 billion cell capsule Take 1 capsule by mouth daily. Yes Provider, Historical   cholecalciferol, vitamin D3, (VITAMIN D3) 2,000 unit tab Take 1 tablet by mouth daily. Yes Provider, Historical   aspirin 81 mg tablet Take 81 mg by mouth daily. Yes Provider, Historical   ezetimibe (ZETIA) 10 mg tablet Take 1 Tab by mouth daily.  1/2/20   Karen Stanley MD   hydroCHLOROthiazide (HYDRODIURIL) 25 mg tablet TAKE 1 TABLET BY MOUTH EVERY DAY. 12/31/19   Karen Stanley MD          Allergies   Allergen Reactions    Diovan [Valsartan] Other (comments)     headaches    Lisinopril Other (comments)     headaches    Pcn [Penicillins] Rash    Statins-Hmg-Coa Reductase Inhibitors Myalgia           Review of Systems   Constitutional: Positive for weight loss. Negative for malaise/fatigue. Respiratory: Negative for cough and shortness of breath. Cardiovascular: Negative for chest pain, palpitations and leg swelling. Gastrointestinal: Negative for abdominal pain, constipation, diarrhea, heartburn, nausea and vomiting. Musculoskeletal: Negative for joint pain and myalgias. She reports will need to use msk relaxer at night due to overdoing it during the day. Diffuse muscle aches. Using once/month. No side effects. Not using them during the day. Skin: Negative for rash. Neurological: Negative for dizziness and headaches. Psychiatric/Behavioral: Negative for depression. The patient is not nervous/anxious and does not have insomnia. Given 10 tabs of xanax in Jan, she reports having 4 left. Last PDMP Willaim Profit as Reviewed:  Review User Review Instant Review Result   ANJELICA HEAD 9/22/2020  8:24 AM Reviewed PDMP [1]        Objective:   Physical Exam  Constitutional:       General: She is not in acute distress. Appearance: Normal appearance. She is obese. Eyes:      Conjunctiva/sclera: Conjunctivae normal.   Neck:      Thyroid: No thyromegaly. Cardiovascular:      Rate and Rhythm: Regular rhythm. Heart sounds: No murmur. Pulmonary:      Effort: Pulmonary effort is normal.      Breath sounds: Normal breath sounds. No decreased breath sounds or wheezing. Abdominal:      General: Bowel sounds are normal.      Palpations: Abdomen is soft. Tenderness: There is no abdominal tenderness. Musculoskeletal:      Right lower leg: No edema. Left lower leg: No edema.    Psychiatric:         Mood and Affect: Mood and affect normal.           Visit Vitals  /82   Pulse 60   Temp 97.5 °F (36.4 °C) (Temporal)   Resp 16   Ht 5' 2.8\" (1.595 m)   Wt 191 lb (86.6 kg)   SpO2 99%   BMI 34.05 kg/m²         Disclaimer:  Advised her to call back or return to office if symptoms worsen/change/persist.  Discussed expected course/resolution/complications of diagnosis in detail with patient. Medication risks/benefits/costs/interactions/alternatives discussed with patient. She was given an after visit summary which includes diagnoses, current medications, & vitals. She expressed understanding with the diagnosis and plan. Aspects of this note may have been generated using voice recognition software. Despite editing, there may be some syntax errors.        Elena Pinto MD

## 2020-09-23 LAB
ALBUMIN SERPL-MCNC: 4.4 G/DL (ref 3.7–4.7)
ALBUMIN/GLOB SERPL: 2.6 {RATIO} (ref 1.2–2.2)
ALP SERPL-CCNC: 92 IU/L (ref 39–117)
ALT SERPL-CCNC: 14 IU/L (ref 0–32)
AST SERPL-CCNC: 14 IU/L (ref 0–40)
BASOPHILS # BLD AUTO: 0 X10E3/UL (ref 0–0.2)
BASOPHILS NFR BLD AUTO: 1 %
BILIRUB SERPL-MCNC: 0.6 MG/DL (ref 0–1.2)
BUN SERPL-MCNC: 22 MG/DL (ref 8–27)
BUN/CREAT SERPL: 28 (ref 12–28)
CALCIUM SERPL-MCNC: 9.5 MG/DL (ref 8.7–10.3)
CHLORIDE SERPL-SCNC: 105 MMOL/L (ref 96–106)
CHOLEST SERPL-MCNC: 201 MG/DL (ref 100–199)
CO2 SERPL-SCNC: 23 MMOL/L (ref 20–29)
CREAT SERPL-MCNC: 0.8 MG/DL (ref 0.57–1)
EOSINOPHIL # BLD AUTO: 0.1 X10E3/UL (ref 0–0.4)
EOSINOPHIL NFR BLD AUTO: 1 %
ERYTHROCYTE [DISTWIDTH] IN BLOOD BY AUTOMATED COUNT: 12.3 % (ref 11.7–15.4)
GLOBULIN SER CALC-MCNC: 1.7 G/DL (ref 1.5–4.5)
GLUCOSE SERPL-MCNC: 92 MG/DL (ref 65–99)
HCT VFR BLD AUTO: 40.1 % (ref 34–46.6)
HDLC SERPL-MCNC: 59 MG/DL
HGB BLD-MCNC: 13.7 G/DL (ref 11.1–15.9)
IMM GRANULOCYTES # BLD AUTO: 0 X10E3/UL (ref 0–0.1)
IMM GRANULOCYTES NFR BLD AUTO: 0 %
LDLC SERPL CALC-MCNC: 130 MG/DL (ref 0–99)
LYMPHOCYTES # BLD AUTO: 1.7 X10E3/UL (ref 0.7–3.1)
LYMPHOCYTES NFR BLD AUTO: 43 %
MCH RBC QN AUTO: 31.7 PG (ref 26.6–33)
MCHC RBC AUTO-ENTMCNC: 34.2 G/DL (ref 31.5–35.7)
MCV RBC AUTO: 93 FL (ref 79–97)
MONOCYTES # BLD AUTO: 0.4 X10E3/UL (ref 0.1–0.9)
MONOCYTES NFR BLD AUTO: 10 %
NEUTROPHILS # BLD AUTO: 1.8 X10E3/UL (ref 1.4–7)
NEUTROPHILS NFR BLD AUTO: 45 %
PLATELET # BLD AUTO: 199 X10E3/UL (ref 150–450)
POTASSIUM SERPL-SCNC: 4.3 MMOL/L (ref 3.5–5.2)
PROT SERPL-MCNC: 6.1 G/DL (ref 6–8.5)
RBC # BLD AUTO: 4.32 X10E6/UL (ref 3.77–5.28)
SODIUM SERPL-SCNC: 141 MMOL/L (ref 134–144)
TRIGL SERPL-MCNC: 65 MG/DL (ref 0–149)
VLDLC SERPL CALC-MCNC: 12 MG/DL (ref 5–40)
WBC # BLD AUTO: 4 X10E3/UL (ref 3.4–10.8)

## 2020-09-23 RX ORDER — EZETIMIBE 10 MG/1
10 TABLET ORAL DAILY
Qty: 90 TAB | Refills: 0 | Status: SHIPPED | OUTPATIENT
Start: 2020-09-23 | End: 2022-03-07

## 2020-09-23 NOTE — PROGRESS NOTES
Results released to patient via [employer]. All labs are stable or at goal for her, except for lipids. They are stable from last year, then 10 yr CVD risk was ~20%. On RYR. Will recommend zetia again (never started it).

## 2020-10-03 DIAGNOSIS — I10 ESSENTIAL HYPERTENSION, BENIGN: ICD-10-CM

## 2020-10-04 RX ORDER — SPIRONOLACTONE 25 MG/1
TABLET ORAL
Qty: 90 TAB | Refills: 1 | Status: SHIPPED | OUTPATIENT
Start: 2020-10-04 | End: 2021-05-17 | Stop reason: SDUPTHER

## 2020-11-13 DIAGNOSIS — F41.9 ANXIETY: ICD-10-CM

## 2020-11-13 RX ORDER — ALPRAZOLAM 0.25 MG/1
0.25 TABLET ORAL
Qty: 10 TAB | Refills: 0 | Status: SHIPPED | OUTPATIENT
Start: 2020-11-13 | End: 2021-06-14 | Stop reason: SDUPTHER

## 2020-11-14 NOTE — TELEPHONE ENCOUNTER
Chart & [facility]  reviewed. Last visit with me:  9/22/2020   Last refilled on: 1/5/20 - #10    No future appointments.      Last PDMP Yuridia Omalley as Reviewed:  Review User Review Instant Review Result   ANJELICA HEAD 11/13/2020  7:01 PM Reviewed PDMP [1]

## 2021-05-17 ENCOUNTER — OFFICE VISIT (OUTPATIENT)
Dept: INTERNAL MEDICINE CLINIC | Age: 76
End: 2021-05-17
Payer: COMMERCIAL

## 2021-05-17 VITALS
DIASTOLIC BLOOD PRESSURE: 82 MMHG | BODY MASS INDEX: 30.73 KG/M2 | OXYGEN SATURATION: 99 % | RESPIRATION RATE: 16 BRPM | SYSTOLIC BLOOD PRESSURE: 136 MMHG | TEMPERATURE: 97.6 F | HEART RATE: 68 BPM | WEIGHT: 167 LBS | HEIGHT: 62 IN

## 2021-05-17 DIAGNOSIS — F41.1 GAD (GENERALIZED ANXIETY DISORDER): ICD-10-CM

## 2021-05-17 DIAGNOSIS — R07.89 NON-CARDIAC CHEST PAIN: Primary | ICD-10-CM

## 2021-05-17 DIAGNOSIS — I10 ESSENTIAL HYPERTENSION, BENIGN: ICD-10-CM

## 2021-05-17 PROCEDURE — 93000 ELECTROCARDIOGRAM COMPLETE: CPT | Performed by: INTERNAL MEDICINE

## 2021-05-17 PROCEDURE — 99213 OFFICE O/P EST LOW 20 MIN: CPT | Performed by: INTERNAL MEDICINE

## 2021-05-17 RX ORDER — SPIRONOLACTONE 25 MG/1
25 TABLET ORAL DAILY
Qty: 90 TAB | Refills: 0 | Status: SHIPPED | OUTPATIENT
Start: 2021-05-17 | End: 2021-08-10

## 2021-05-17 NOTE — PROGRESS NOTES
Emerald Jose is a 77 y.o. female who was seen in clinic today (5/17/2021) for an acute visit. Assessment & Plan:   Below is the assessment and plan developed based on review of pertinent history, physical exam, labs, studies, and medications. 1. Non-cardiac chest pain  Comments:  new dx, differential reviewed, favor reflux, could not get EKG to work x 3, cont PPI, red flags reviewed, d/w her when to go to ER  Orders:  -     AMB POC EKG ROUTINE W/ 12 LEADS, INTER & REP  2. Essential hypertension, benign  Comments:  medication refilled, has appointment next month to review in detail  Orders:  -     spironolactone (ALDACTONE) 25 mg tablet; Take 1 Tab by mouth daily. , Normal, Disp-90 Tab, R-0  3. DIONI (generalized anxiety disorder)  Comments:  slightly worse, worried about panic attacks due to CP, [facility]  reviewed, last xanax refill was on 11/13/20 - #10, will refill, has f/u next month    Follow-up and Dispositions    · Return in about 1 month (around 6/17/2021) for Regular follow up. Subjective/Objective: Lj Edwards was seen today for GERD    Chest Pain  Patient complains of chest pain. Onset was 1 month ago, and has been a fluctuating course since that time. The patient admits to chest discomfort that is intermittent, with radiation to none, and in intensity that is indigestion or feeling like there is a big gas bubble in the center of her chest in nature. Associated symptoms are rare palpitations. Aggravating factors are walking and 2 hrs after eating. It does not occur w/ walking if she did not just eat. Alleviating factors are: nothing. She has not tried any Tums/Rolaids. She started Prevacid 5-6 days and has noticed a big improvement. Weight Review:  She returns to clinic to follow up for obesity (Body mass index is 30.54 kg/m². ). She is on the following weight loss regimen: nothing. She has lost 20-25 lbs since last visit. She reports not eating out as much. Exercising more.   She is down 70 lbs in the last 15 months. Review of Systems   Constitutional: Positive for weight loss. Negative for malaise/fatigue. Respiratory: Negative for cough and shortness of breath. Cardiovascular: Positive for chest pain and palpitations. Negative for leg swelling. Gastrointestinal: Negative for abdominal pain, nausea and vomiting. Prior to Admission medications    Medication Sig Start Date End Date Taking? Authorizing Provider   ALPRAZolam Sonia Fonseca) 0.25 mg tablet Take 1 Tab by mouth daily as needed for Anxiety. 11/13/20  Yes Beth Washington MD   spironolactone (ALDACTONE) 25 mg tablet TAKE 1 TABLET BY MOUTH EVERY DAY 10/4/20  Yes Beth Washington MD   ezetimibe (ZETIA) 10 mg tablet Take 1 Tab by mouth daily. 9/23/20  Yes Beth Washington MD   lactobacillus rhamnosus gg 10 billion cell (CULTURELLE) 10 billion cell capsule Take 1 capsule by mouth daily. Yes Provider, Historical   cholecalciferol, vitamin D3, (VITAMIN D3) 2,000 unit tab Take 1 tablet by mouth daily. Yes Provider, Historical   aspirin 81 mg tablet Take 81 mg by mouth daily. Yes Provider, Historical   mometasone (NASONEX) 50 mcg/actuation nasal spray 2 SPRAYS BY BOTH NOSTRILS ROUTE AS NEEDED. 9/22/20 5/17/21  Beth Washington MD   tiZANidine (ZANAFLEX) 2 mg tablet TAKE 1 TABLET BY MOUTH DAILY AS NEEDED. 9/22/20 5/17/21  Beth Washington MD   potassium chloride SR (KLOR-CON 10) 10 mEq tablet TAKE 1 TABLET BY MOUTH DAILY. APPOINTMENT REQUIRED PRIOR TO ANY FURTHER REFILLS 7/22/20 5/17/21  Beth Washington MD   red yeast rice 600 mg tab Take 1 Tab by mouth two (2) times a day. 10/22/15 5/17/21  Beth Washington MD           Physical Exam  Constitutional:       Appearance: She is obese. Cardiovascular:      Rate and Rhythm: Regular rhythm. Heart sounds: Normal heart sounds. No murmur. Pulmonary:      Effort: Pulmonary effort is normal.      Breath sounds: Normal breath sounds.  No wheezing or rales.   Abdominal:      General: Bowel sounds are normal.      Palpations: There is no mass. Tenderness: There is no abdominal tenderness. Musculoskeletal:      Right lower leg: No edema. Left lower leg: No edema. Visit Vitals  /82   Pulse 68   Temp 97.6 °F (36.4 °C) (Temporal)   Resp 16   Ht 5' 2\" (1.575 m)   Wt 167 lb (75.8 kg)   SpO2 99%   BMI 30.54 kg/m²         Disclaimer:  Advised her to call back or return to office if symptoms worsen/change/persist.  Discussed expected course/resolution/complications of diagnosis in detail with patient. Medication risks/benefits/costs/interactions/alternatives discussed with patient. She was given an after visit summary which includes diagnoses, current medications, & vitals. She expressed understanding with the diagnosis and plan.       Katelyn Cardenas MD

## 2021-05-17 NOTE — PROGRESS NOTES
Verified name and birth date for privacy precautions. Chart reviewed in preparation for today's visit. Chief Complaint   Patient presents with    GERD          Health Maintenance Due   Topic    COVID-19 Vaccine (1)    Colorectal Cancer Screening Combo     Bone Densitometry (Dexa) Screening          Wt Readings from Last 3 Encounters:   05/17/21 167 lb (75.8 kg)   09/22/20 191 lb (86.6 kg)   12/31/19 228 lb (103.4 kg)     Temp Readings from Last 3 Encounters:   05/17/21 97.6 °F (36.4 °C) (Temporal)   09/22/20 97.5 °F (36.4 °C) (Temporal)   12/31/19 98.3 °F (36.8 °C) (Oral)     BP Readings from Last 3 Encounters:   05/17/21 (!) 146/84   09/22/20 136/82   12/31/19 142/76     Pulse Readings from Last 3 Encounters:   05/17/21 68   09/22/20 60   12/31/19 66         Learning Assessment:  :     Learning Assessment 9/11/2014   PRIMARY LEARNER Patient   HIGHEST LEVEL OF EDUCATION - PRIMARY LEARNER  4 YEARS OF COLLEGE   BARRIERS PRIMARY LEARNER NONE   CO-LEARNER CAREGIVER No   PRIMARY LANGUAGE ENGLISH   LEARNER PREFERENCE PRIMARY READING   ANSWERED BY Patient   RELATIONSHIP SELF       Depression Screening:  :     3 most recent PHQ Screens 5/17/2021   Little interest or pleasure in doing things Not at all   Feeling down, depressed, irritable, or hopeless Not at all   Total Score PHQ 2 0       Fall Risk Assessment:  :     Fall Risk Assessment, last 12 mths 5/17/2021   Able to walk? Yes   Fall in past 12 months? 0   Do you feel unsteady? 0   Are you worried about falling 0       Abuse Screening:  :     Abuse Screening Questionnaire 5/17/2021 9/22/2020 12/31/2019 7/11/2018 5/15/2017   Do you ever feel afraid of your partner? N N N N N   Are you in a relationship with someone who physically or mentally threatens you? N N N N N   Is it safe for you to go home?  Saadia Moore

## 2021-06-14 ENCOUNTER — OFFICE VISIT (OUTPATIENT)
Dept: INTERNAL MEDICINE CLINIC | Age: 76
End: 2021-06-14
Payer: COMMERCIAL

## 2021-06-14 VITALS
HEIGHT: 62 IN | OXYGEN SATURATION: 99 % | TEMPERATURE: 97.1 F | BODY MASS INDEX: 30.55 KG/M2 | WEIGHT: 166 LBS | HEART RATE: 54 BPM | RESPIRATION RATE: 16 BRPM | SYSTOLIC BLOOD PRESSURE: 118 MMHG | DIASTOLIC BLOOD PRESSURE: 74 MMHG

## 2021-06-14 DIAGNOSIS — F41.9 ANXIETY: ICD-10-CM

## 2021-06-14 DIAGNOSIS — K21.9 GASTROESOPHAGEAL REFLUX DISEASE WITHOUT ESOPHAGITIS: ICD-10-CM

## 2021-06-14 DIAGNOSIS — J30.1 NON-SEASONAL ALLERGIC RHINITIS DUE TO POLLEN: ICD-10-CM

## 2021-06-14 DIAGNOSIS — F41.1 GAD (GENERALIZED ANXIETY DISORDER): ICD-10-CM

## 2021-06-14 DIAGNOSIS — Z12.11 COLON CANCER SCREENING: ICD-10-CM

## 2021-06-14 DIAGNOSIS — E78.00 PURE HYPERCHOLESTEROLEMIA: ICD-10-CM

## 2021-06-14 DIAGNOSIS — I10 ESSENTIAL HYPERTENSION, BENIGN: Primary | ICD-10-CM

## 2021-06-14 LAB
ALBUMIN SERPL-MCNC: 3.8 G/DL (ref 3.5–5)
ALBUMIN/GLOB SERPL: 1.5 {RATIO} (ref 1.1–2.2)
ALP SERPL-CCNC: 88 U/L (ref 45–117)
ALT SERPL-CCNC: 22 U/L (ref 12–78)
ANION GAP SERPL CALC-SCNC: 9 MMOL/L (ref 5–15)
AST SERPL-CCNC: 10 U/L (ref 15–37)
BILIRUB SERPL-MCNC: 0.8 MG/DL (ref 0.2–1)
BUN SERPL-MCNC: 27 MG/DL (ref 6–20)
BUN/CREAT SERPL: 46 (ref 12–20)
CALCIUM SERPL-MCNC: 9.1 MG/DL (ref 8.5–10.1)
CHLORIDE SERPL-SCNC: 107 MMOL/L (ref 97–108)
CHOLEST SERPL-MCNC: 196 MG/DL
CO2 SERPL-SCNC: 26 MMOL/L (ref 21–32)
CREAT SERPL-MCNC: 0.59 MG/DL (ref 0.55–1.02)
ERYTHROCYTE [DISTWIDTH] IN BLOOD BY AUTOMATED COUNT: 13.6 % (ref 11.5–14.5)
GLOBULIN SER CALC-MCNC: 2.6 G/DL (ref 2–4)
GLUCOSE SERPL-MCNC: 85 MG/DL (ref 65–100)
HCT VFR BLD AUTO: 40.9 % (ref 35–47)
HDLC SERPL-MCNC: 72 MG/DL
HDLC SERPL: 2.7 {RATIO} (ref 0–5)
HGB BLD-MCNC: 12.9 G/DL (ref 11.5–16)
LDLC SERPL CALC-MCNC: 112.4 MG/DL (ref 0–100)
MCH RBC QN AUTO: 30.9 PG (ref 26–34)
MCHC RBC AUTO-ENTMCNC: 31.5 G/DL (ref 30–36.5)
MCV RBC AUTO: 98.1 FL (ref 80–99)
NRBC # BLD: 0 K/UL (ref 0–0.01)
NRBC BLD-RTO: 0 PER 100 WBC
PLATELET # BLD AUTO: 176 K/UL (ref 150–400)
PMV BLD AUTO: 11.8 FL (ref 8.9–12.9)
POTASSIUM SERPL-SCNC: 4.1 MMOL/L (ref 3.5–5.1)
PROT SERPL-MCNC: 6.4 G/DL (ref 6.4–8.2)
RBC # BLD AUTO: 4.17 M/UL (ref 3.8–5.2)
SODIUM SERPL-SCNC: 142 MMOL/L (ref 136–145)
TRIGL SERPL-MCNC: 58 MG/DL (ref ?–150)
VLDLC SERPL CALC-MCNC: 11.6 MG/DL
WBC # BLD AUTO: 4.3 K/UL (ref 3.6–11)

## 2021-06-14 PROCEDURE — 99214 OFFICE O/P EST MOD 30 MIN: CPT | Performed by: INTERNAL MEDICINE

## 2021-06-14 RX ORDER — MOMETASONE FUROATE 50 UG/1
2 SPRAY, METERED NASAL DAILY
Qty: 1 CONTAINER | Refills: 1 | Status: CANCELLED | OUTPATIENT
Start: 2021-06-14

## 2021-06-14 RX ORDER — MOMETASONE FUROATE 50 UG/1
2 SPRAY, METERED NASAL DAILY
COMMUNITY
End: 2021-06-14 | Stop reason: ALTCHOICE

## 2021-06-14 RX ORDER — ALPRAZOLAM 0.25 MG/1
0.25 TABLET ORAL
Qty: 10 TABLET | Refills: 0 | Status: SHIPPED | OUTPATIENT
Start: 2021-06-14 | End: 2021-11-08

## 2021-06-14 RX ORDER — AMPICILLIN TRIHYDRATE 250 MG
600 CAPSULE ORAL DAILY
COMMUNITY

## 2021-06-14 RX ORDER — FLUTICASONE PROPIONATE 50 MCG
2 SPRAY, SUSPENSION (ML) NASAL DAILY
Qty: 1 BOTTLE | Refills: 1 | Status: SHIPPED | OUTPATIENT
Start: 2021-06-14 | End: 2021-07-07

## 2021-06-14 NOTE — ASSESSMENT & PLAN NOTE
More situational, using meds rarely,  reviewed, no changes, reviewed my concerns with this medications & risk for falls

## 2021-06-14 NOTE — ASSESSMENT & PLAN NOTE
Fluctuating control, not ideally controlled, will restart nasal steroid, but change to Flonase to do cost

## 2021-06-14 NOTE — ASSESSMENT & PLAN NOTE
Fluctuating control, did not tolerate zetia, continue current treatment (red yeast rice), pending review of labs

## 2021-06-14 NOTE — ASSESSMENT & PLAN NOTE
Improved, no longer having symptoms w/ diet changes, cont prn use of Tums/Rolaids, no indication for daily medication, red flags reviewed

## 2021-06-14 NOTE — PROGRESS NOTES
Ant Lynn is a 77 y.o. female who was seen in clinic today (6/14/2021). Assessment & Plan:   Below is the assessment and plan developed based on review of pertinent history, physical exam, labs, studies, and medications. 1. Essential hypertension, benign  Assessment & Plan:  well controlled, continue current treatment pending review of labs    Orders:  -     METABOLIC PANEL, COMPREHENSIVE; Future  -     CBC W/O DIFF; Future  2. Pure hypercholesterolemia  Assessment & Plan:  Fluctuating control, did not tolerate zetia, continue current treatment (red yeast rice), pending review of labs    Orders:  -     METABOLIC PANEL, COMPREHENSIVE; Future  -     LIPID PANEL; Future  3. Anxiety  -     ALPRAZolam (XANAX) 0.25 mg tablet; Take 1 Tablet by mouth daily as needed for Anxiety., Normal, Disp-10 Tablet, R-0Not to exceed 5 additional fills before 03/05/2019  4. Non-seasonal allergic rhinitis due to pollen  Assessment & Plan:  Fluctuating control, not ideally controlled, will restart nasal steroid, but change to Flonase to do cost   Orders:  -     fluticasone propionate (FLONASE) 50 mcg/actuation nasal spray; 2 Sprays by Both Nostrils route daily. , Normal, Disp-1 Bottle, R-1  5. Gastroesophageal reflux disease without esophagitis  Assessment & Plan:  Improved, no longer having symptoms w/ diet changes, cont prn use of Tums/Rolaids, no indication for daily medication, red flags reviewed   6. Colon cancer screening  Comments:  she has thought about it and is interested, but will schedule after summer  7. DIONI (generalized anxiety disorder)  Assessment & Plan:  More situational, using meds rarely,  reviewed, no changes, reviewed my concerns with this medications & risk for falls     Follow-up and Dispositions    · Return in about 6 months (around 12/14/2021) for FULL PHYSICAL - 30 minutes. Subjective/Objective:    Jose Spence was seen today for Hypertension and Cholesterol Problem      Since last visit: chest pain has resolved    Cardiovascular Review  The patient has hypertension, hyperlipidemia and obesity. She reports taking medications as instructed, medication side effects noted - zetia upset her stomach, home BP monitoring in range of 686'C systolic over 01'M diastolic. She generally follows a low fat low cholesterol diet, generally follows a low sodium diet, exercises regularly. GI Review  She has a history of GERD. She used tums several times w/ good relief. She adjusted her diet and has not had anymore symptoms. Allergies  She has Allergic Rhinitis that vary in seasonal presentation. Current symptoms: rhinorrhea, postnasal drip and watery eyes. Was on Nasonex but insurance did not fill it. She did not tolerate claritin due to being wired. Mental Health Review  Patient is seen for anxiety. She is using medication for travel only. She reports some social anxiety w/ a crowd. She was given 10 tabs in 11/20, she reports having 2 left. Reports experiences the following side effects from the treatment: none.  reviewed. Prior to Admission medications    Medication Sig Start Date End Date Taking? Authorizing Provider   red yeast rice extract 600 mg cap Take 600 mg by mouth daily. Yes Provider, Historical   mometasone (NASONEX) 50 mcg/actuation nasal spray 2 Sprays by Both Nostrils route daily. Yes Provider, Historical   spironolactone (ALDACTONE) 25 mg tablet Take 1 Tab by mouth daily. 5/17/21  Yes Oliverio Barone MD   ALPRAZolam Dakotah Andrade) 0.25 mg tablet Take 1 Tab by mouth daily as needed for Anxiety. 11/13/20  Yes Oliverio Barone MD   lactobacillus rhamnosus gg 10 billion cell (CULTURELLE) 10 billion cell capsule Take 1 capsule by mouth daily. Yes Provider, Historical   cholecalciferol, vitamin D3, (VITAMIN D3) 2,000 unit tab Take 1 tablet by mouth daily. Yes Provider, Historical   aspirin 81 mg tablet Take 81 mg by mouth daily.    Yes Provider, Historical   ezetimibe (ZETIA) 10 mg tablet Take 1 Tab by mouth daily. Patient not taking: Reported on 6/14/2021 9/23/20   Td Valdivia MD        Review of Systems   Constitutional: Negative for malaise/fatigue and weight loss. Respiratory: Negative for cough and shortness of breath. Cardiovascular: Negative for chest pain, palpitations and leg swelling. Gastrointestinal: Negative for abdominal pain, constipation, diarrhea, heartburn, nausea and vomiting. Musculoskeletal: Negative for joint pain and myalgias. Skin: Negative for rash. Neurological: Negative for dizziness and headaches. Psychiatric/Behavioral: Negative for depression. The patient is not nervous/anxious and does not have insomnia. Physical Exam  Constitutional:       General: She is not in acute distress. Appearance: Normal appearance. Eyes:      Conjunctiva/sclera: Conjunctivae normal.   Cardiovascular:      Rate and Rhythm: Regular rhythm. Bradycardia present. Heart sounds: No murmur heard. Pulmonary:      Effort: Pulmonary effort is normal.      Breath sounds: Normal breath sounds. No decreased breath sounds or wheezing. Abdominal:      General: Bowel sounds are normal.      Palpations: Abdomen is soft. Tenderness: There is no abdominal tenderness. Musculoskeletal:      Right lower leg: No edema. Left lower leg: No edema.    Psychiatric:         Mood and Affect: Mood and affect normal.         Behavior: Behavior normal.         Visit Vitals  /74   Pulse (!) 54   Temp 97.1 °F (36.2 °C) (Temporal)   Resp 16   Ht 5' 2\" (1.575 m)   Wt 166 lb (75.3 kg)   SpO2 99%   BMI 30.36 kg/m²         Bryanna Lyn MD

## 2021-06-15 NOTE — PROGRESS NOTES
Results released to patient via [employer]. All labs are stable or at goal for her. Lipid acceptable - statin & zetia intolerant.

## 2021-06-30 NOTE — PROGRESS NOTES
[facility]  [address], [place] [address]      Progress Note          Subjective   Patient ID: Susan is a 38 year old female.    Chief Complaint   Patient presents with   • Convey Results     labs   • Video Visit     HPI:   Patient is a 38-year-old  female with a diagnosis of acid reflux, overactive bladder, plantar fasciitis bilaterally, hyperhidrosis of the axilla, prediabetes, iron deficiency anemia, obesity, and blood in her stools.  Today she presents via video/virtual visit to discuss her multiple medical problems and laboratory tests that were drawn on her last office visit of May 4, 2021.  She is requesting a discussion on her laboratory test results.  As of today, she has not kept her appointment with podiatry, dermatology, and GI.  She states that all of these consults and office visits have been scheduled in the month of July.      Patient's medications, allergies, past medical, surgical, social and family histories were reviewed and updated as appropriate.    Review of Systems   Constitutional: Negative.         The patient complains of hyperhidrosis.  See HPI for details.   HENT: Negative.    Eyes: Negative.    Respiratory: Negative.    Cardiovascular: Negative.    Gastrointestinal: Negative.    Endocrine: Negative.    Genitourinary: Negative.    Musculoskeletal:        The patient complains of pain of bilateral heels that is worse in early morning hours.  The pain is improved as the day goes on.  See HPI for details.   Skin: Negative.    Allergic/Immunologic: Negative.    Neurological: Negative.    Hematological: Negative.    Psychiatric/Behavioral: Negative.        Objective   Physical Exam:  This is a video/virtual visit.  I am unable to examine the patient.  She appears to be comfortable.    No visits with results within 2 Week(s) from this visit.   Latest known visit with results is:   Walk In on 05/06/2021   Component Date Value Ref Range Status   • GRP A STREP  PT DAILY TREATMENT NOTE 2-15    Patient Name: Domingo Miller  Date:3/8/2018  : 1945  [x]  Patient  Verified  Payor: Ghulam Holley / Plan: Sussy Duckworth / Product Type: PPO /    In time: 8:25 AM  Out time:  8:35 AM  Total Treatment Time (min): 70 (60 timed)  Visit #: 16    Treatment Area: Pain in right knee [M25.561]    SUBJECTIVE  Pain Level (0-10 scale): 10  Any medication changes, allergies to medications, adverse drug reactions, diagnosis change, or new procedure performed?: [x] No    [] Yes (see summary sheet for update)  Subjective functional status/changes:   [] No changes reported  Pt states that her pain is higher up her leg today- \"like I've been working something that hasn't been worked before\". She states she can \"really feel it\" during isometric HS curls on SWB.  She continues to have pain with activities such as getting in/out of car, pulling leg up to put on shoes/socks, going up and down stairs    OBJECTIVE    Modality rationale: decrease inflammation, decrease pain and increase tissue extensibility to improve the patients ability to perform ADL's   Min Type Additional Details    [] Estim: []Att   []Unatt        []TENS instruct                  []IFC  []Premod   []NMES                     []Other:  []w/US   []w/ice   []w/heat  Position:  Location:    []  Traction: [] Cervical       []Lumbar                       [] Prone          []Supine                       []Intermittent   []Continuous Lbs:  [] before manual  [] after manual  []w/heat    []  Ultrasound: []Continuous   [] Pulsed at:                            []1MHz   []3MHz Location:  W/cm2:    []  Paraffin         Location:  []w/heat   Ice 10 [x]  Ice- post     []  Heat- pre  []  Ice massage Position:  Location: R HS    []  Laser  []  Other: Position:  Location:    []  Vasopneumatic Device Pressure:       [] lo [] med [] hi   Temperature:    [x] Skin assessment post-treatment:  [x]intact []redness- no adverse reaction 05/06/2021 Negative  Negative Final   • Internal Procedural Controls Accep* 05/06/2021 Yes   Final         Assessment   Problem List Items Addressed This Visit        Digestive    Acid reflux     The patient states that her chronic reflux has resolved.  She no longer requires medication or over-the-counter Tums for her discomfort.            Urinary    RESOLVED: OAB (overactive bladder)     The patient states that her overactive bladder has resolved.  She no longer takes oxybutynin.  No further work-up or treatment is necessary at this time.            Musculoskeletal    Plantar fasciitis, bilateral     The patient's plantar fasciitis persists.  She has an appointment with podiatry in 2 weeks.            Integumentary    Generalized hyperhidrosis     Patient has an appointment with dermatology in 2 weeks.  She is encouraged to keep that appointment.            Endocrine    Prediabetes     The patient's last hemoglobin A1c was normal.  I will continue to monitor.            Hematologic & Lymphatic    Iron deficiency anemia due to chronic blood loss - Primary     Anemia has improved with a hemoglobin of 12.3.  The patient's iron saturation and serum iron are low.  I have prescribed iron sulfate 325 mg p.o. daily for 2 months.  I have also prescribed Colace at 100 mg p.o. twice daily  x2 months.    Approximately 20 minutes of time was spent with the patient discussing her multiple medical problems.  She was encouraged to schedule an appointment for follow-up in 3 months.         Relevant Medications    Ferrous Sulfate (Iron) 325 (65 Fe) MG Tab    docusate sodium (Colace) 100 MG capsule       Other    Obesity (BMI 30.0-34.9)     The patient was encouraged to increase exercise, decrease her caloric intake, and reduce her weight.         Occult blood in stools     The patient was referred to GI for evaluation blood in her stools.  She has an appointment in 2 weeks.  She is encouraged to keep that appointment as  []redness - adverse reaction:     45 min Therapeutic Exercise:  [x] See flow sheet :    Rationale: increase ROM, increase strength and improve balance to improve the patients ability to perform ADL's     min Therapeutic Activity:  [x]  See flow sheet :    Rationale: increase ROM and increase strength  to improve the patients ability to walk up/down stairs, transfer in/out of car    15 min Manual Therapy:    STM/cross-friction massage over biceps femoris and pox HS  IASTM using \"boomerang\" tool, feathering technique used over biceps femoris   Rationale: decrease pain, increase ROM and increase tissue extensibility  to improve the patients ability to ambulate          With   [] TE   [] TA   [] neuro   [] other: Patient Education: [x] Review HEP    [] Progressed/Changed HEP based on:   [] positioning   [] body mechanics   [] transfers   [] heat/ice application    [] other:      Other Objective/Functional Measures:   P! Standing and prone HS curl  TTP R biceps femoris  Antony's neg on R  TTP R lateral joint line  P! R hip flex, ER with knee flex (trying to cross leg to put on shoes/socks)    Pain Level (0-10 scale) post treatment: 1/10    ASSESSMENT/Changes in Function:    Pt has completed 16 skilled PT visits. Pt reporting overall improvement in symptoms however she continues have pain with several daily activities (see objective information). TTP R lateral joint line today, however Zenon's neg. At this time seems to be either originating from biceps femoris or possible meniscus pathology. Pt is very compliant with HEP. Encouraged pt to make appt with either Dr. Christi Whitfield or with ortho MD for possible further imaging and/or next steps in POC. Recommend to continue PT in the meantime.       Patient will continue to benefit from skilled PT services to modify and progress therapeutic interventions, address functional mobility deficits, address ROM deficits, address strength deficits, analyze and address soft tissue restrictions, analyze and cue movement patterns and analyze and modify body mechanics/ergonomics to attain remaining goals. []  See Plan of Care  []  See progress note/recertification  []  See Discharge Summary         Progress towards goals / Updated goals:  Short Term Goals: To be accomplished in 2-3 weeks:  1) Pt will be independent in initial HEP MET  2) Pt will report > or =25% decrease in exacerbation of symptoms     Long Term Goals:  To be accomplished in 4-6 weeks:  1) Pt will report being able to ambulate for > or =30 min with minimal to no pain in order to grocery shop  2) Pt will perform unilateral R HR > or =50% in to aid in performance of ADL's  3) Pt will improve FOTO score to > or = 42/100 in order to demonstrate improvement in functional mobility    PLAN  []  Upgrade activities as tolerated     [x]  Continue plan of care  []  Update interventions per flow sheet       []  Discharge due to:_  []  Other:_      Nik Arnold, PT 3/8/2018  8:34 AM scheduled.

## 2021-07-07 DIAGNOSIS — J30.1 NON-SEASONAL ALLERGIC RHINITIS DUE TO POLLEN: ICD-10-CM

## 2021-07-07 RX ORDER — FLUTICASONE PROPIONATE 50 MCG
SPRAY, SUSPENSION (ML) NASAL
Qty: 1 BOTTLE | Refills: 1 | Status: SHIPPED | OUTPATIENT
Start: 2021-07-07

## 2021-08-10 DIAGNOSIS — I10 ESSENTIAL HYPERTENSION, BENIGN: ICD-10-CM

## 2021-08-10 RX ORDER — SPIRONOLACTONE 25 MG/1
TABLET ORAL
Qty: 90 TABLET | Refills: 1 | Status: SHIPPED | OUTPATIENT
Start: 2021-08-10 | End: 2021-12-20

## 2021-10-11 ENCOUNTER — VIRTUAL VISIT (OUTPATIENT)
Dept: INTERNAL MEDICINE CLINIC | Age: 76
End: 2021-10-11
Payer: COMMERCIAL

## 2021-10-11 DIAGNOSIS — R19.7 DIARRHEA, UNSPECIFIED TYPE: Primary | ICD-10-CM

## 2021-10-11 PROCEDURE — 99213 OFFICE O/P EST LOW 20 MIN: CPT | Performed by: INTERNAL MEDICINE

## 2021-10-11 NOTE — PROGRESS NOTES
Anthony Gallo is a 77 y.o. female who was seen by synchronous (real-time) audio-video technology on 10/11/2021. Assessment & Plan:   Below is the assessment and plan developed based on review of pertinent history, physical exam (if applicable), labs, studies, and medications. 1. Diarrhea, unspecified type  Comments:  new dx, differential reviewed, slightly improved, favor med + diet changes, reviewed BRAT diet, avoid Imodium    Red flags and expectations were reviewed & discussed with the her. She verbalized understanding. She will update me in 3-4 days. Follow-up and Dispositions    · Return if symptoms worsen or fail to improve. Subjective: Anthony Gallo was seen for Diarrhea      She reports on 10/2 she took a pinworm medication after her grandchildren were diagnosed. Since then noticed abd pain (cramping), nausea (on/off), and diarrhea. She went to her sister's and ate a rich meal. Normally has been limiting her diet to 1500 calories in an attempt to lose weight. She feels like she can feel her intestines growl after eating a bite or two. Her appetite is less. Trying a BRAT/bland diet. She is using Imodium sporadically. Prior to using Imodium would have ~3 BM's per day, mostly in the evening, initially would have some formed stools but then more watery. There would be intermittent that she would have a normal BM. No other family members w/ GI issues. No fevers or chills. Prior to Admission medications    Medication Sig Start Date End Date Taking? Authorizing Provider   spironolactone (ALDACTONE) 25 mg tablet TAKE 1 TABLET BY MOUTH EVERY DAY 8/10/21  Yes Cecelia Garcia MD   fluticasone propionate (FLONASE) 50 mcg/actuation nasal spray SPRAY 2 SPRAYS INTO EACH NOSTRIL EVERY DAY 7/7/21  Yes Cecelia Garcia MD   red yeast rice extract 600 mg cap Take 600 mg by mouth daily.    Yes Provider, Historical   ALPRAZolam (XANAX) 0.25 mg tablet Take 1 Tablet by mouth daily as needed for Anxiety. 6/14/21  Yes Emerson Choudhury MD   lactobacillus rhamnosus gg 10 billion cell (CULTURELLE) 10 billion cell capsule Take 1 capsule by mouth daily. Yes Provider, Historical   cholecalciferol, vitamin D3, (VITAMIN D3) 2,000 unit tab Take 1 tablet by mouth daily. Yes Provider, Historical   aspirin 81 mg tablet Take 81 mg by mouth daily. Yes Provider, Historical   ezetimibe (ZETIA) 10 mg tablet Take 1 Tab by mouth daily. Patient not taking: Reported on 6/14/2021 9/23/20   Emerson Choudhury MD         Objective:     Patient-Reported Vitals 10/11/2021   Patient-Reported Weight 158lb   Patient-Reported Systolic  400   Patient-Reported Diastolic 70     General: alert, cooperative, no distress   Mental  status: normal mood, behavior, speech, dress, motor activity, and thought processes, able to follow commands   Eyes: normal sclera, anicteric    Mouth: mucous membranes moist   Neck:    Resp: normal effort and no respiratory distress   Neuro: no gross deficits   Musculoskeletal:    Skin:    Psychiatric: no hallucinations     Gerardo Hylton, was evaluated through a synchronous (real-time) audio-video encounter. The patient (or guardian if applicable) is aware that this is a billable service. Verbal consent to proceed has been obtained within the past 12 months. The visit was conducted pursuant to the emergency declaration under the [address] [place] [address] authority and the [employer] General Act. Patient identification was verified, and a caregiver was present when appropriate. The patient was located in a state where the provider was credentialed to provide care.      Rhonda Moreno MD

## 2021-10-15 ENCOUNTER — TELEPHONE (OUTPATIENT)
Dept: INTERNAL MEDICINE CLINIC | Age: 76
End: 2021-10-15

## 2021-10-15 NOTE — TELEPHONE ENCOUNTER
----- Message from Edie Stone sent at 10/15/2021  8:07 AM EDT -----  Subject: Message to Provider    QUESTIONS  Information for Provider? pt is calling because dr Rosa Schaeffer told her to call   back is she was not feeling better. she saw him on monday. still   experiencing abdominal gas and abdominal discomfort.   ---------------------------------------------------------------------------  --------------  CALL BACK INFO  What is the best way for the office to contact you? OK to leave message on   voicemail  Preferred Call Back Phone Number? [phone]  ---------------------------------------------------------------------------  --------------  SCRIPT ANSWERS  Relationship to Patient?  Self

## 2021-11-08 ENCOUNTER — E-VISIT (OUTPATIENT)
Dept: OTHER | Age: 76
End: 2021-11-08

## 2021-11-08 DIAGNOSIS — F41.9 ANXIETY: ICD-10-CM

## 2021-11-08 DIAGNOSIS — F41.1 GAD (GENERALIZED ANXIETY DISORDER): Primary | ICD-10-CM

## 2021-11-08 RX ORDER — ALPRAZOLAM 0.25 MG/1
TABLET ORAL
Qty: 10 TABLET | Refills: 0 | Status: SHIPPED | OUTPATIENT
Start: 2021-11-08 | End: 2022-10-10 | Stop reason: SDUPTHER

## 2021-11-09 NOTE — TELEPHONE ENCOUNTER
Chart & VA  reviewed.       Last visit with me:  10/11/2021   Last refilled on: 6/15/21    Future Appointments   Date Time Provider Brandi Flor   12/14/2021  1:30 PM MD GEORGINA Braga BS AMB        Last PDMP Marichuy Duggan as Reviewed:  Review User Review Instant Review Result   ANJELICA HEAD 11/8/2021  9:28 PM Reviewed PDMP [1]

## 2021-11-10 DIAGNOSIS — F41.1 GAD (GENERALIZED ANXIETY DISORDER): Primary | ICD-10-CM

## 2021-11-10 RX ORDER — BUSPIRONE HYDROCHLORIDE 5 MG/1
5 TABLET ORAL 2 TIMES DAILY
Qty: 60 TABLET | Refills: 1 | Status: SHIPPED | OUTPATIENT
Start: 2021-11-10 | End: 2021-12-23 | Stop reason: SDUPTHER

## 2021-12-07 DIAGNOSIS — F41.1 GAD (GENERALIZED ANXIETY DISORDER): ICD-10-CM

## 2021-12-07 RX ORDER — BUSPIRONE HYDROCHLORIDE 5 MG/1
5 TABLET ORAL 2 TIMES DAILY
Qty: 60 TABLET | Refills: 1 | OUTPATIENT
Start: 2021-12-07

## 2021-12-07 NOTE — TELEPHONE ENCOUNTER
Requested Prescriptions     Pending Prescriptions Disp Refills    busPIRone (BUSPAR) 5 mg tablet 60 Tablet 1     Sig: Take 1 Tablet by mouth two (2) times a day.      [id number] [place], [address] [facility]

## 2021-12-15 ENCOUNTER — E-VISIT (OUTPATIENT)
Dept: INTERNAL MEDICINE CLINIC | Age: 76
End: 2021-12-15
Payer: COMMERCIAL

## 2021-12-15 DIAGNOSIS — N30.00 ACUTE CYSTITIS WITHOUT HEMATURIA: Primary | ICD-10-CM

## 2021-12-15 PROCEDURE — 99421 OL DIG E/M SVC 5-10 MIN: CPT | Performed by: INTERNAL MEDICINE

## 2021-12-15 RX ORDER — SULFAMETHOXAZOLE AND TRIMETHOPRIM 800; 160 MG/1; MG/1
1 TABLET ORAL 2 TIMES DAILY
Qty: 6 TABLET | Refills: 0 | Status: SHIPPED | OUTPATIENT
Start: 2021-12-15 | End: 2021-12-18

## 2021-12-15 NOTE — PROGRESS NOTES
E-Visit Note:    HPI: per patient questionnaire, this has been reviewed. Chart reviewed, last UC was in '18 - Citrobacter koseri (S to everything except Pipercillin). No Urgent Care records. EXAM: n/a    ----------------------------------  Diagnoses and all orders for this visit:    1. Acute cystitis without hematuria  -     CULTURE, URINE; Future  -     URINALYSIS W/ RFLX MICROSCOPIC; Future         PLAN:   Will have her provider urine sample at [facility] labs. Will then send in bactrim. ----------------------------------  The patient was advised to call if these symptoms worsen or fail to improve as anticipated. 5-10 minutes were spent on the digital evaluation and management of this patient.        Barby Archuleta MD

## 2021-12-16 NOTE — PROGRESS NOTES
UA normal except trace LE's. Bactrim sent in pending review of UC. K has been running low in the past, low risk for interaction w/ aldactone.

## 2021-12-20 DIAGNOSIS — I10 ESSENTIAL HYPERTENSION, BENIGN: ICD-10-CM

## 2021-12-20 RX ORDER — SPIRONOLACTONE 25 MG/1
TABLET ORAL
Qty: 30 TABLET | Refills: 0 | Status: SHIPPED | OUTPATIENT
Start: 2021-12-20 | End: 2022-01-16

## 2021-12-23 DIAGNOSIS — F41.1 GAD (GENERALIZED ANXIETY DISORDER): ICD-10-CM

## 2021-12-23 RX ORDER — BUSPIRONE HYDROCHLORIDE 7.5 MG/1
7.5 TABLET ORAL 2 TIMES DAILY
Qty: 60 TABLET | Refills: 1 | Status: SHIPPED | OUTPATIENT
Start: 2021-12-23 | End: 2022-02-17

## 2022-01-15 DIAGNOSIS — I10 ESSENTIAL HYPERTENSION, BENIGN: ICD-10-CM

## 2022-01-16 RX ORDER — SPIRONOLACTONE 25 MG/1
TABLET ORAL
Qty: 15 TABLET | Refills: 0 | Status: SHIPPED | OUTPATIENT
Start: 2022-01-16 | End: 2022-01-29

## 2022-01-17 NOTE — TELEPHONE ENCOUNTER
Overdue for f/u, see previous [employer] message, she was supposed to call to schedule, never did. 15 tab sent in.

## 2022-01-18 ENCOUNTER — PATIENT MESSAGE (OUTPATIENT)
Dept: INTERNAL MEDICINE CLINIC | Age: 77
End: 2022-01-18

## 2022-01-18 DIAGNOSIS — F41.1 GAD (GENERALIZED ANXIETY DISORDER): ICD-10-CM

## 2022-01-18 RX ORDER — BUSPIRONE HYDROCHLORIDE 7.5 MG/1
7.5 TABLET ORAL 2 TIMES DAILY
Qty: 60 TABLET | Refills: 1 | OUTPATIENT
Start: 2022-01-18

## 2022-01-18 NOTE — TELEPHONE ENCOUNTER
Requested Prescriptions     Pending Prescriptions Disp Refills    busPIRone (BUSPAR) 7.5 mg tablet 60 Tablet 1     Sig: Take 1 Tablet by mouth two (2) times a day.        [id number] [address] [facility]

## 2022-01-29 DIAGNOSIS — I10 ESSENTIAL HYPERTENSION, BENIGN: ICD-10-CM

## 2022-01-29 RX ORDER — SPIRONOLACTONE 25 MG/1
TABLET ORAL
Qty: 30 TABLET | Refills: 1 | Status: SHIPPED | OUTPATIENT
Start: 2022-01-29 | End: 2022-03-09

## 2022-01-30 NOTE — TELEPHONE ENCOUNTER
Overdue for f/u, previously given 15 tabs, she did schedule appt.       Future Appointments   Date Time Provider Brandi Ray   3/7/2022  8:15 AM Aureliano Pulido MD [facility]

## 2022-02-13 NOTE — PROGRESS NOTES
ASSESSMENT/PLAN:  Below is the assessment and plan developed based on review of pertinent history, physical exam, labs, studies, and medications. 1. Osteoarthritis of right knee, unspecified osteoarthritis type  -     [place] DRAIN/INJECT LARGE JOINT/BURSA  -     REFERRAL TO ORTHOPEDICS  -     NY DRAIN/INJECT LARGE JOINT/BURSA  2. Osteoarthritis of left knee, unspecified osteoarthritis type  -     NY DRAIN/INJECT LARGE JOINT/BURSA  -     REFERRAL TO ORTHOPEDICS  -     NY DRAIN/INJECT LARGE JOINT/BURSA      Return for Referral to knee replacement specilaist.    In discussion with the patient, we considered the numerus possible diagnoses that could be contributing to their present symptoms. We also deliberated on the extensive management options that must be considered to treat their current condition. We reviewed their accessible prior medical records, diagnostic tests, and current health and employment information. We considered how these symptoms were affecting the patient´s activities of daily living as well as employment and fitness activities. The patient had various questions regarding the possible risks, benefits, complications, morbidity and mortality regarding their diagnosis and treatment options. The patients´ comorbidities were considered, and I advocated that they consider maximizing lifestyle modification through nutrition and exercise to aid in addressing their symptoms. Shared decision making yielded an understanding to move forward with conservation treatment preferences. The patient expressed understanding that if conservative management fails to alleviate the present symptoms they will return to office for re-evaluation and consideration of additional diagnostic tests and potential surgical options.      In the interim, we have recommended ice, elevation, and anti-inflammatory medications along with a physician directed home exercise program. We discussed the risks and common side effects of anti-inflammatory medications and instructed the patient to discontinue the medication and contact us if they experienced any side effects. The patient was encouraged to discuss the possible side effects with their family physician or pharmacist prior to initiating any new medications. I have encouraged the patient to take an active role in their treatment by pursuing a healthy lifestyle with better nutritional choices and regular low impact exercise. We discussed that weight loss can be beneficial to relieving discomfort in the lower extremities as well as other health benefits. I have asked the patient to seek advice from their primary care physician regarding additional resources available to achieve their weight loss goals. We discussed the possibility of an injection of a steroid mixed with a local anesthetic to relieve pain and decrease inflammation in the right knee. The risks of a steroid injection which include but are not limited to cartilage damage, death of nearby bone, joint infection, nerve damage, temporary facial flushing, temporary steroid flare of pain and inflammation in the joint, temporary increase in blood sugar, tendon weakening or rupture, thinning of nearby bone (osteoporosis), thinning of skin and soft tissue around the injection site, and whitening or lightening of the skin around the injection site were reviewed at length. We specifically advised that patients with diabetes talk to their primary care to ensure they are safe for a steroid injection due to the transient increase in blood sugar associated with the injection. We discussed the chance of increased bleeding and bruising if the patient is on blood thinners or certain dietary supplements that have a blood-thinning effect.  We advised patients that have an active infection, history of allergic reactions to steroids or take medications that may prohibit them from receiving a steroid injection to talk to their primary care physician before receiving and injection. We also talked about limiting the number of injections because these potential side effects increase with larger doses and repeated use. After explaining the risks and benefits of the procedure and obtaining verbal informed consent from the patient, the proposed area for injection was confirmed with the patient. After all questions and concerns were addressed, the skin was prepped with alcohol to reduce the chances of infection. The skin was anesthetized with a topical ethylene chloride spray and a mixture of two milliliters of Kenalog l (40mg/ml), one milliliter of Lidocaine and one milliliter of Marcaine was injected slowly into the intra-articular space of right knee in a sterile fashion without difficulty. The needle was removed and disposed of in a sterile container. The patient tolerated the injection well and a band-aid was placed on the skin. The patient was counseled on protecting the injection area, avoiding water submersion for 2 days, icing for pain relief and looking for signs and symptoms of infection. We requested that the patient contact us if any symptoms persist greater than 48 hours after the injection. After explaining the risks and benefits of the procedure and obtaining verbal informed consent from the patient, the proposed area for injection was confirmed with the patient. After all questions and concerns were addressed, the skin was prepped with alcohol to reduce the chances of infection. The skin was anesthetized with a topical ethylene chloride spray and a mixture of two milliliters of Kenalog (40mg/ml), one milliliter of Lidocaine and one milliliter of Marcaine was injected slowly into the intra-articular space of left knee in a sterile fashion without difficulty. The needle was removed and disposed of in a sterile container. The patient tolerated the injection well and a band-aid was placed on the skin.  The patient was counseled on protecting the injection area, avoiding water submersion for 2 days, icing for pain relief and looking for signs and symptoms of infection. We requested that the patient contact us if any symptoms persist greater than 48 hours after the injection. After a long discussion regarding treatment options, we have elected to refer the patient to one of our knee replacement specialists for more comprehensive care of their arthritis. SUBJECTIVE/OBJECTIVE:  Amber Gerard (: 1945) is a 77 y.o. female, patient,here for evaluation of the Knee Pain (bilateral knees)  . The patient returns today for follow-up of her knees. She has known bilateral arthritis. She has received multiple injections in the past.  He is planning a trip with her sister and brother-in-law up in [facility]) this summer. Her last injections were approximately 5 months ago and they have just started to wear off. She would like to repeat those today. Otherwise she is doing well. Her pain is not waking her from sleep. She takes over-the-counter pain medications as needed. Physical Exam    Upon physical examination, the patient is well developed, well nourished, alert, and oriented times three, with normal mood and affect and walks with an antalgic gait. Upon examination of the right knee, the patient is tender to palpation along the medial joint line and has an effusion. They have crepitus of the patellofemoral joint with range of motion and discomfort with patella grind testing. The patient has discomfort with Antony´s maneuvers, and the knee is stable. They lack full flexion secondary to the effusion but have full extension. They have 5/5 strength and are neurovascularly intact distally. There is no erythema, warmth, or skin lesions present. Upon examination of the left knee, the patient is tender to palpation along the medial joint line and has an effusion.  They have crepitus of the patellofemoral joint with range of motion and discomfort with patella grind testing. The patient has discomfort with Antony´s maneuvers, and the knee is stable. They lack full flexion secondary to the effusion but have full extension. They have 5/5 strength and are neurovascularly intact distally. There is no erythema, warmth, or skin lesions present. Imaging:    I have reviewed the patient´s previous diagnostic tests in an effort to support the diagnosis and treatment options. Allergies   Allergen Reactions    Diovan [Valsartan] Other (comments)     headaches    Lisinopril Other (comments)     headaches    Pcn [Penicillins] Rash    Statins-Hmg-Coa Reductase Inhibitors Myalgia       Current Outpatient Medications   Medication Sig    spironolactone (ALDACTONE) 25 mg tablet TAKE 1 TABLET BY MOUTH DAILY.  busPIRone (BUSPAR) 7.5 mg tablet Take 1 Tablet by mouth two (2) times a day.  red yeast rice extract 600 mg cap Take 600 mg by mouth daily.  lactobacillus rhamnosus gg 10 billion cell (CULTURELLE) 10 billion cell capsule Take 1 capsule by mouth daily.  cholecalciferol, vitamin D3, (VITAMIN D3) 2,000 unit tab Take 1 tablet by mouth daily.  aspirin 81 mg tablet Take 81 mg by mouth daily.  ALPRAZolam (XANAX) 0.25 mg tablet TAKE 1 TABLET BY MOUTH EVERY DAY AS NEEDED FOR ANXIETY (Patient not taking: Reported on 2/14/2022)    fluticasone propionate (FLONASE) 50 mcg/actuation nasal spray SPRAY 2 SPRAYS INTO EACH NOSTRIL EVERY DAY (Patient not taking: Reported on 2/14/2022)    ezetimibe (ZETIA) 10 mg tablet Take 1 Tab by mouth daily.  (Patient not taking: Reported on 6/14/2021)     Current Facility-Administered Medications   Medication    bupivacaine (PF) (MARCAINE) 0.5 % (5 mg/mL) injection 10 mg    triamcinolone acetonide (KENALOG-40) 40 mg/mL injection 40 mg    bupivacaine (PF) (MARCAINE) 0.5 % (5 mg/mL) injection 10 mg    triamcinolone acetonide (KENALOG-40) 40 mg/mL injection 40 mg       Past Medical History:   Diagnosis Date  GERD (gastroesophageal reflux disease)     Hypertension     OA (osteoarthritis)     hand    Seasonal allergic rhinitis        Past Surgical History:   Procedure Laterality Date    HX COLONOSCOPY  2009    Dr Syed Bennett GYN  2007    D&C and IUD removal    STRESS TEST CARDIAC  2005       Family History   Problem Relation Age of Onset    Hypertension Father     Heart Disease Father 43        MI     Cancer Sister         breast    Dementia Mother     No Known Problems Brother     No Known Problems Sister     Heart Disease Sister 62        MI    No Known Problems Daughter        Social History     Socioeconomic History    Marital status: SINGLE     Spouse name:     Number of children: Not on file    Years of education: Not on file    Highest education level: Not on file   Occupational History    Occupation:  [employer]   Tobacco Use    Smoking status: Never Smoker    Smokeless tobacco: Never Used   Vaping Use    Vaping Use: Never used   Substance and Sexual Activity    Alcohol use: No    Drug use: No    Sexual activity: Not Currently     Partners: Male   Other Topics Concern    Not on file   Social History Narrative    Not on file     Social Determinants of Health     Financial Resource Strain:     Difficulty of Paying Living Expenses: Not on file   Food Insecurity:     Worried About Running Out of Food in the Last Year: Not on file    Daniel of Food in the Last Year: Not on file   Transportation Needs:     Lack of Transportation (Medical): Not on file    Lack of Transportation (Non-Medical):  Not on file   Physical Activity:     Days of Exercise per Week: Not on file    Minutes of Exercise per Session: Not on file   Stress:     Feeling of Stress : Not on file   Social Connections:     Frequency of Communication with Friends and Family: Not on file    Frequency of Social Gatherings with Friends and Family: Not on file    Attends Judaism Services: Not on file   [address] [facility] [employer] or Organizations: Not on file    Attends Club or Organization Meetings: Not on file    Marital Status: Not on file   Intimate Partner Violence:     Fear of Current or Ex-Partner: Not on file    Emotionally Abused: Not on file    Physically Abused: Not on file    Sexually Abused: Not on file   Housing Stability:     Unable to Pay for Housing in the Last Year: Not on file    Number of [place] in the Last Year: Not on file    Unstable Housing in the Last Year: Not on file       Review of Systems    No flowsheet data found. Vitals:  Ht 5' 3\" (1.6 m)   Wt 165 lb (74.8 kg)   BMI 29.23 kg/m²    Body mass index is 29.23 kg/m². An electronic signature was used to authenticate this note.   -- Sudhir Lora MD

## 2022-02-14 ENCOUNTER — OFFICE VISIT (OUTPATIENT)
Dept: ORTHOPEDIC SURGERY | Age: 77
End: 2022-02-14
Payer: COMMERCIAL

## 2022-02-14 VITALS — BODY MASS INDEX: 29.23 KG/M2 | HEIGHT: 63 IN | WEIGHT: 165 LBS

## 2022-02-14 DIAGNOSIS — M17.12 OSTEOARTHRITIS OF LEFT KNEE, UNSPECIFIED OSTEOARTHRITIS TYPE: ICD-10-CM

## 2022-02-14 DIAGNOSIS — M17.11 OSTEOARTHRITIS OF RIGHT KNEE, UNSPECIFIED OSTEOARTHRITIS TYPE: Primary | ICD-10-CM

## 2022-02-14 PROCEDURE — 20610 DRAIN/INJ JOINT/BURSA W/O US: CPT | Performed by: ORTHOPAEDIC SURGERY

## 2022-02-14 RX ORDER — TRIAMCINOLONE ACETONIDE 40 MG/ML
40 INJECTION, SUSPENSION INTRA-ARTICULAR; INTRAMUSCULAR ONCE
Status: COMPLETED | OUTPATIENT
Start: 2022-02-14 | End: 2022-02-14

## 2022-02-14 RX ORDER — BUPIVACAINE HYDROCHLORIDE 5 MG/ML
2 INJECTION, SOLUTION EPIDURAL; INTRACAUDAL ONCE
Status: COMPLETED | OUTPATIENT
Start: 2022-02-14 | End: 2022-02-14

## 2022-02-14 RX ADMIN — TRIAMCINOLONE ACETONIDE 40 MG: 40 INJECTION, SUSPENSION INTRA-ARTICULAR; INTRAMUSCULAR at 11:37

## 2022-02-14 RX ADMIN — BUPIVACAINE HYDROCHLORIDE 10 MG: 5 INJECTION, SOLUTION EPIDURAL; INTRACAUDAL at 11:36

## 2022-02-14 RX ADMIN — BUPIVACAINE HYDROCHLORIDE 10 MG: 5 INJECTION, SOLUTION EPIDURAL; INTRACAUDAL at 11:37

## 2022-02-17 DIAGNOSIS — F41.1 GAD (GENERALIZED ANXIETY DISORDER): ICD-10-CM

## 2022-02-17 RX ORDER — BUSPIRONE HYDROCHLORIDE 7.5 MG/1
TABLET ORAL
Qty: 60 TABLET | Refills: 0 | Status: SHIPPED | OUTPATIENT
Start: 2022-02-17 | End: 2022-03-22

## 2022-02-17 NOTE — TELEPHONE ENCOUNTER
Future Appointments   Date Time Provider Brandi Ray   3/7/2022  8:15 AM Sophie Bonilla MD [facility]

## 2022-03-06 DIAGNOSIS — I10 ESSENTIAL HYPERTENSION, BENIGN: ICD-10-CM

## 2022-03-07 ENCOUNTER — OFFICE VISIT (OUTPATIENT)
Dept: INTERNAL MEDICINE CLINIC | Age: 77
End: 2022-03-07
Payer: COMMERCIAL

## 2022-03-07 VITALS
SYSTOLIC BLOOD PRESSURE: 161 MMHG | HEIGHT: 63 IN | OXYGEN SATURATION: 99 % | HEART RATE: 59 BPM | RESPIRATION RATE: 16 BRPM | TEMPERATURE: 97.1 F | BODY MASS INDEX: 29.41 KG/M2 | DIASTOLIC BLOOD PRESSURE: 84 MMHG | WEIGHT: 166 LBS

## 2022-03-07 DIAGNOSIS — I10 ESSENTIAL HYPERTENSION, BENIGN: ICD-10-CM

## 2022-03-07 DIAGNOSIS — M94.9 DISORDER OF BONE AND CARTILAGE: ICD-10-CM

## 2022-03-07 DIAGNOSIS — F41.1 GAD (GENERALIZED ANXIETY DISORDER): Primary | ICD-10-CM

## 2022-03-07 DIAGNOSIS — M89.9 DISORDER OF BONE AND CARTILAGE: ICD-10-CM

## 2022-03-07 DIAGNOSIS — Z12.31 ENCOUNTER FOR SCREENING MAMMOGRAM FOR MALIGNANT NEOPLASM OF BREAST: ICD-10-CM

## 2022-03-07 PROCEDURE — 99214 OFFICE O/P EST MOD 30 MIN: CPT | Performed by: INTERNAL MEDICINE

## 2022-03-07 NOTE — ASSESSMENT & PLAN NOTE
High today, normally well controlled, she reports possible increase in salt in her diet. She will reduce this, start checking amb BP, and RTC for BP check in 3-4 wks.

## 2022-03-07 NOTE — PROGRESS NOTES
Verified name and birth date for privacy precautions. Chart reviewed in preparation for today's visit. Chief Complaint   Patient presents with    Hypertension    Anxiety          Health Maintenance Due   Topic    Colorectal Cancer Screening Combo     Bone Densitometry (Dexa) Screening     Flu Vaccine (1)         Wt Readings from Last 3 Encounters:   03/07/22 166 lb (75.3 kg)   02/14/22 165 lb (74.8 kg)   06/14/21 166 lb (75.3 kg)     Temp Readings from Last 3 Encounters:   03/07/22 97.1 °F (36.2 °C) (Temporal)   06/14/21 97.1 °F (36.2 °C) (Temporal)   05/17/21 97.6 °F (36.4 °C) (Temporal)     BP Readings from Last 3 Encounters:   03/07/22 (!) 161/84   06/14/21 118/74   05/17/21 136/82     Pulse Readings from Last 3 Encounters:   03/07/22 (!) 59   06/14/21 (!) 54   05/17/21 68         Learning Assessment:  :     Learning Assessment 9/11/2014   PRIMARY LEARNER Patient   HIGHEST LEVEL OF EDUCATION - PRIMARY LEARNER  4 YEARS OF COLLEGE   BARRIERS PRIMARY LEARNER NONE   CO-LEARNER CAREGIVER No   PRIMARY LANGUAGE ENGLISH   LEARNER PREFERENCE PRIMARY READING   ANSWERED BY Patient   RELATIONSHIP SELF       Depression Screening:  :     3 most recent PHQ Screens 3/7/2022   Little interest or pleasure in doing things Not at all   Feeling down, depressed, irritable, or hopeless Not at all   Total Score PHQ 2 0       Fall Risk Assessment:  :     Fall Risk Assessment, last 12 mths 3/7/2022   Able to walk? Yes   Fall in past 12 months? 0   Do you feel unsteady? 0   Are you worried about falling 0       Abuse Screening:  :     Abuse Screening Questionnaire 3/7/2022 5/17/2021 9/22/2020 12/31/2019 7/11/2018 5/15/2017   Do you ever feel afraid of your partner? N N N N N N   Are you in a relationship with someone who physically or mentally threatens you? N N N N N N   Is it safe for you to go home?  Kira Ho

## 2022-03-07 NOTE — ASSESSMENT & PLAN NOTE
Improved, acceptable but likely could be slightly better, I reviewed treatment options with her, I reviewed life style changes to help improve mood, continue current treatment. Due to possible SE on sleep will try decreasing to 1/2 tab in the evening. Did review she can try taking 1.5 tabs in the AM and 1/2 tab in evening to see how she does.

## 2022-03-07 NOTE — PROGRESS NOTES
Beronica Dietz is a 77 y.o. female who was seen in clinic today (3/7/2022). Assessment & Plan:   Below is the assessment and plan developed based on review of pertinent history, physical exam, labs, studies, and medications. 1. DIONI (generalized anxiety disorder)  Assessment & Plan:  Improved, acceptable but likely could be slightly better, I reviewed treatment options with her, I reviewed life style changes to help improve mood, continue current treatment. Due to possible SE on sleep will try decreasing to 1/2 tab in the evening. Did review she can try taking 1.5 tabs in the AM and 1/2 tab in evening to see how she does. 2. Essential hypertension, benign  Assessment & Plan:  High today, normally well controlled, she reports possible increase in salt in her diet. She will reduce this, start checking amb BP, and RTC for BP check in 3-4 wks. 3. Encounter for screening mammogram for malignant neoplasm of breast  -     [facility] 3D JUAQUIN W MAMMO BI SCREENING INCL CAD; Future  4. Disorder of bone and cartilage  -     DEXA BONE DENSITY STUDY AXIAL; Future    Follow-up and Dispositions    · Return in about 1 month (around 4/7/2022) for Nurse visit for blood pressure check. Subjective/Objective: Deepa Farris was seen today for Hypertension and Anxiety    She was due for f/u back in Dec '21. She was supposed to scheduled colonoscopy but never did. She has an appointment to see GI on April 16th. Mental Health Review  Patient is seen for anxiety. She thinks medication is helping. She feels with the state of the world she would be significantly worse. She is not using xanax at all. Available GAD7 reviewed. Reports experiences the following side effects from the treatment: she is not sleeping as well. Normally goes to bed w/o any issues but since starting it she is having trouble getting to sleep. [facility] on 11[facility]21 or 10 tabs. Previous filled on 6/15/21 and 11/13/20.     Cardiovascular Review  The patient has hypertension. She reports taking medications as instructed, no medication side effects noted, home BP monitoring in range of 352-036'K systolic. She not attempting to follow a low sodium diet, exercises regularly. Prior to Admission medications    Medication Sig Start Date End Date Taking? Authorizing Provider   busPIRone (BUSPAR) 7.5 mg tablet TAKE 1 TABLET BY MOUTH TWO TIMES A DAY. 2/17/22  Yes Esther Ordonez MD   spironolactone (ALDACTONE) 25 mg tablet TAKE 1 TABLET BY MOUTH DAILY. 1/29/22  Yes Esther Ordonez MD   fluticasone propionate (FLONASE) 50 mcg/actuation nasal spray SPRAY 2 SPRAYS INTO EACH NOSTRIL EVERY DAY 7/7/21  Yes Esther Ordonez MD   red yeast rice extract 600 mg cap Take 600 mg by mouth daily. Yes Provider, Historical   lactobacillus rhamnosus gg 10 billion cell (CULTURELLE) 10 billion cell capsule Take 1 capsule by mouth daily. Yes Provider, Historical   cholecalciferol, vitamin D3, (VITAMIN D3) 2,000 unit tab Take 1 tablet by mouth daily. Yes Provider, Historical   aspirin 81 mg tablet Take 81 mg by mouth daily. Yes Provider, Historical   ALPRAZolam (XANAX) 0.25 mg tablet TAKE 1 TABLET BY MOUTH EVERY DAY AS NEEDED FOR ANXIETY  Patient not taking: Reported on 2/14/2022 11/8/21   Esther Ordonez MD   ezetimibe (ZETIA) 10 mg tablet Take 1 Tab by mouth daily. Patient not taking: Reported on 6/14/2021 9/23/20 3/7/22  Esther Ordonez MD        Review of Systems   Constitutional: Negative for malaise/fatigue and weight loss. Respiratory: Negative for cough and shortness of breath. Cardiovascular: Negative for chest pain, palpitations and leg swelling. Gastrointestinal: Negative for abdominal pain, constipation, diarrhea, heartburn, nausea and vomiting. Musculoskeletal: Positive for joint pain (knees, just saw ortho, had injections). Negative for myalgias. Skin: Negative for rash.    Neurological: Negative for dizziness and headaches. Psychiatric/Behavioral: Negative for depression. The patient is not nervous/anxious and does not have insomnia. Physical Exam  Constitutional:       General: She is not in acute distress. Appearance: Normal appearance. Eyes:      Conjunctiva/sclera: Conjunctivae normal.   Cardiovascular:      Rate and Rhythm: Regular rhythm. Bradycardia present. Heart sounds: No murmur heard. Pulmonary:      Effort: Pulmonary effort is normal.      Breath sounds: Normal breath sounds. No decreased breath sounds or wheezing. Abdominal:      General: Bowel sounds are normal.      Palpations: Abdomen is soft. Tenderness: There is no abdominal tenderness. Musculoskeletal:      Right lower leg: No edema. Left lower leg: No edema.    Psychiatric:         Mood and Affect: Mood and affect normal.         Behavior: Behavior normal.       Visit Vitals  BP (!) 161/84 (BP 1 Location: Left arm, BP Patient Position: Sitting, BP Cuff Size: Large adult)   Pulse (!) 59   Temp 97.1 °F (36.2 °C) (Temporal)   Resp 16   Ht 5' 3\" (1.6 m)   Wt 166 lb (75.3 kg)   SpO2 99%   BMI 29.41 kg/m²       Anthony Morrison MD

## 2022-03-09 RX ORDER — SPIRONOLACTONE 25 MG/1
TABLET ORAL
Qty: 30 TABLET | Refills: 0 | Status: SHIPPED | OUTPATIENT
Start: 2022-03-09 | End: 2022-04-13

## 2022-03-19 PROBLEM — S83.206A ACUTE MENISCAL TEAR OF RIGHT KNEE: Status: ACTIVE | Noted: 2018-03-19

## 2022-03-22 DIAGNOSIS — F41.1 GAD (GENERALIZED ANXIETY DISORDER): ICD-10-CM

## 2022-03-22 RX ORDER — BUSPIRONE HYDROCHLORIDE 7.5 MG/1
TABLET ORAL
Qty: 180 TABLET | Refills: 1 | Status: SHIPPED | OUTPATIENT
Start: 2022-03-22 | End: 2022-10-07 | Stop reason: SDUPTHER

## 2022-03-23 NOTE — TELEPHONE ENCOUNTER
Future Appointments   Date Time Provider Brandi Ray   9/12/2022  9:00 AM Lucio Beckett MD [facility]

## 2022-04-13 DIAGNOSIS — I10 ESSENTIAL HYPERTENSION, BENIGN: ICD-10-CM

## 2022-04-13 RX ORDER — SPIRONOLACTONE 25 MG/1
TABLET ORAL
Qty: 30 TABLET | Refills: 0 | Status: SHIPPED | OUTPATIENT
Start: 2022-04-13 | End: 2022-07-06

## 2022-05-04 ENCOUNTER — CLINICAL SUPPORT (OUTPATIENT)
Dept: INTERNAL MEDICINE CLINIC | Age: 77
End: 2022-05-04

## 2022-05-04 VITALS
SYSTOLIC BLOOD PRESSURE: 140 MMHG | OXYGEN SATURATION: 98 % | HEART RATE: 68 BPM | DIASTOLIC BLOOD PRESSURE: 72 MMHG | RESPIRATION RATE: 16 BRPM | BODY MASS INDEX: 30.02 KG/M2 | WEIGHT: 169.4 LBS | HEIGHT: 63 IN | TEMPERATURE: 97.1 F

## 2022-05-04 DIAGNOSIS — I10 ESSENTIAL HYPERTENSION: Primary | ICD-10-CM

## 2022-05-04 NOTE — PROGRESS NOTES
BP normally at goal, high x 2. Pt told nurse did not want medications adjusted. She has not been following low salt diet so wants to make changes. Will need to RTC in 2 month, if high again will need med adjusted.   See [employer] message    BP Readings from Last 7 Encounters:   05/04/22 (!) 162/82   03/07/22 (!) 161/84   06/14/21 118/74   05/17/21 136/82   09/22/20 136/82   12/31/19 142/76   07/11/18 146/80

## 2022-05-04 NOTE — PROGRESS NOTES
Pt. Is here for BP check. After sitting 15 min. 140/72. Pt. Has decreased salt somewhat and wants to continue working on diet prior to increasing medication.

## 2022-06-15 ENCOUNTER — OFFICE VISIT (OUTPATIENT)
Dept: ORTHOPEDIC SURGERY | Age: 77
End: 2022-06-15
Payer: COMMERCIAL

## 2022-06-15 VITALS — BODY MASS INDEX: 29.95 KG/M2 | HEIGHT: 63 IN | WEIGHT: 169 LBS

## 2022-06-15 DIAGNOSIS — M17.11 OSTEOARTHRITIS OF RIGHT KNEE, UNSPECIFIED OSTEOARTHRITIS TYPE: Primary | ICD-10-CM

## 2022-06-15 PROCEDURE — 20610 DRAIN/INJ JOINT/BURSA W/O US: CPT | Performed by: ORTHOPAEDIC SURGERY

## 2022-06-15 RX ORDER — BUPIVACAINE HYDROCHLORIDE 5 MG/ML
2 INJECTION, SOLUTION EPIDURAL; INTRACAUDAL ONCE
Status: COMPLETED | OUTPATIENT
Start: 2022-06-15 | End: 2022-06-15

## 2022-06-15 RX ORDER — TRIAMCINOLONE ACETONIDE 40 MG/ML
40 INJECTION, SUSPENSION INTRA-ARTICULAR; INTRAMUSCULAR ONCE
Status: COMPLETED | OUTPATIENT
Start: 2022-06-15 | End: 2022-06-15

## 2022-06-15 RX ADMIN — BUPIVACAINE HYDROCHLORIDE 10 MG: 5 INJECTION, SOLUTION EPIDURAL; INTRACAUDAL at 10:22

## 2022-06-15 RX ADMIN — TRIAMCINOLONE ACETONIDE 40 MG: 40 INJECTION, SUSPENSION INTRA-ARTICULAR; INTRAMUSCULAR at 10:23

## 2022-06-15 RX ADMIN — TRIAMCINOLONE ACETONIDE 40 MG: 40 INJECTION, SUSPENSION INTRA-ARTICULAR; INTRAMUSCULAR at 10:22

## 2022-06-15 NOTE — PROGRESS NOTES
ASSESSMENT/PLAN:  Below is the assessment and plan developed based on review of pertinent history, physical exam, labs, studies, and medications. 1. Osteoarthritis of right knee, unspecified osteoarthritis type  -     NE DRAIN/INJECT LARGE JOINT/BURSA  -     REFERRAL TO ORTHOPEDICS  -     NE DRAIN/INJECT LARGE JOINT/BURSA  2. Osteoarthritis of left knee, unspecified osteoarthritis type  -     NE DRAIN/INJECT LARGE JOINT/BURSA  -     REFERRAL TO ORTHOPEDICS  -     NE DRAIN/INJECT LARGE JOINT/BURSA      Return for Referral to knee replacement specilaist.    In discussion with the patient, we considered the numerus possible diagnoses that could be contributing to their present symptoms. We also deliberated on the extensive management options that must be considered to treat their current condition. We reviewed their accessible prior medical records, diagnostic tests, and current health and employment information. We considered how these symptoms were affecting the patient´s activities of daily living as well as employment and fitness activities. The patient had various questions regarding the possible risks, benefits, complications, morbidity and mortality regarding their diagnosis and treatment options. The patients´ comorbidities were considered, and I advocated that they consider maximizing lifestyle modification through nutrition and exercise to aid in addressing their symptoms. Shared decision making yielded an understanding to move forward with conservation treatment preferences. The patient expressed understanding that if conservative management fails to alleviate the present symptoms they will return to office for re-evaluation and consideration of additional diagnostic tests and potential surgical options.      In the interim, we have recommended ice, elevation, and anti-inflammatory medications along with a physician directed home exercise program. We discussed the risks and common side effects of anti-inflammatory medications and instructed the patient to discontinue the medication and contact us if they experienced any side effects. The patient was encouraged to discuss the possible side effects with their family physician or pharmacist prior to initiating any new medications. I have encouraged the patient to take an active role in their treatment by pursuing a healthy lifestyle with better nutritional choices and regular low impact exercise. We discussed that weight loss can be beneficial to relieving discomfort in the lower extremities as well as other health benefits. I have asked the patient to seek advice from their primary care physician regarding additional resources available to achieve their weight loss goals. We discussed the possibility of an injection of a steroid mixed with a local anesthetic to relieve pain and decrease inflammation in the right knee. The risks of a steroid injection which include but are not limited to cartilage damage, death of nearby bone, joint infection, nerve damage, temporary facial flushing, temporary steroid flare of pain and inflammation in the joint, temporary increase in blood sugar, tendon weakening or rupture, thinning of nearby bone (osteoporosis), thinning of skin and soft tissue around the injection site, and whitening or lightening of the skin around the injection site were reviewed at length. We specifically advised that patients with diabetes talk to their primary care to ensure they are safe for a steroid injection due to the transient increase in blood sugar associated with the injection. We discussed the chance of increased bleeding and bruising if the patient is on blood thinners or certain dietary supplements that have a blood-thinning effect.  We advised patients that have an active infection, history of allergic reactions to steroids or take medications that may prohibit them from receiving a steroid injection to talk to their primary care physician before receiving and injection. We also talked about limiting the number of injections because these potential side effects increase with larger doses and repeated use. After explaining the risks and benefits of the procedure and obtaining verbal informed consent from the patient, the proposed area for injection was confirmed with the patient. After all questions and concerns were addressed, the skin was prepped with alcohol to reduce the chances of infection. The skin was anesthetized with a topical ethylene chloride spray and a mixture of two milliliters of Kenalog l (40mg/ml), one milliliter of Lidocaine and one milliliter of Marcaine was injected slowly into the intra-articular space of right knee in a sterile fashion without difficulty. The needle was removed and disposed of in a sterile container. The patient tolerated the injection well and a band-aid was placed on the skin. The patient was counseled on protecting the injection area, avoiding water submersion for 2 days, icing for pain relief and looking for signs and symptoms of infection. We requested that the patient contact us if any symptoms persist greater than 48 hours after the injection. After explaining the risks and benefits of the procedure and obtaining verbal informed consent from the patient, the proposed area for injection was confirmed with the patient. After all questions and concerns were addressed, the skin was prepped with alcohol to reduce the chances of infection. The skin was anesthetized with a topical ethylene chloride spray and a mixture of two milliliters of Kenalog (40mg/ml), one milliliter of Lidocaine and one milliliter of Marcaine was injected slowly into the intra-articular space of left knee in a sterile fashion without difficulty. The needle was removed and disposed of in a sterile container. The patient tolerated the injection well and a band-aid was placed on the skin.  The patient was counseled on protecting the injection area, avoiding water submersion for 2 days, icing for pain relief and looking for signs and symptoms of infection. We requested that the patient contact us if any symptoms persist greater than 48 hours after the injection. After a long discussion regarding treatment options, we have elected to refer the patient to one of our knee replacement specialists for more comprehensive care of their arthritis. SUBJECTIVE/OBJECTIVE:  Lilly Junior (: 1945) is a 77 y.o. female, patient,here for evaluation of the Knee Pain (bilateral knees)  . The patient returns today for follow-up of her knees. She has known bilateral arthritis. She has received multiple injections in the past.  He is planning a trip with her sister and brother-in-law up in [facility]) this summer. Her last injections were approximately 5 months ago and they have just started to wear off. She would like to repeat those today. Otherwise she is doing well. Her pain is not waking her from sleep. She takes over-the-counter pain medications as needed. Physical Exam    Upon physical examination, the patient is well developed, well nourished, alert, and oriented times three, with normal mood and affect and walks with an antalgic gait. Upon examination of the right knee, the patient is tender to palpation along the medial joint line and has an effusion. They have crepitus of the patellofemoral joint with range of motion and discomfort with patella grind testing. The patient has discomfort with Antony´s maneuvers, and the knee is stable. They lack full flexion secondary to the effusion but have full extension. They have 5/5 strength and are neurovascularly intact distally. There is no erythema, warmth, or skin lesions present. Upon examination of the left knee, the patient is tender to palpation along the medial joint line and has an effusion.  They have crepitus of the patellofemoral joint with range of motion and discomfort with patella grind testing. The patient has discomfort with Antony´s maneuvers, and the knee is stable. They lack full flexion secondary to the effusion but have full extension. They have 5/5 strength and are neurovascularly intact distally. There is no erythema, warmth, or skin lesions present. Imaging:    I have reviewed the patient´s previous diagnostic tests in an effort to support the diagnosis and treatment options. Allergies   Allergen Reactions    Diovan [Valsartan] Other (comments)     headaches    Lisinopril Other (comments)     headaches    Pcn [Penicillins] Rash    Statins-Hmg-Coa Reductase Inhibitors Myalgia       Current Outpatient Medications   Medication Sig    spironolactone (ALDACTONE) 25 mg tablet TAKE 1 TABLET BY MOUTH DAILY.  busPIRone (BUSPAR) 7.5 mg tablet Take 1 Tablet by mouth two (2) times a day.  red yeast rice extract 600 mg cap Take 600 mg by mouth daily.  lactobacillus rhamnosus gg 10 billion cell (CULTURELLE) 10 billion cell capsule Take 1 capsule by mouth daily.  cholecalciferol, vitamin D3, (VITAMIN D3) 2,000 unit tab Take 1 tablet by mouth daily.  aspirin 81 mg tablet Take 81 mg by mouth daily.  ALPRAZolam (XANAX) 0.25 mg tablet TAKE 1 TABLET BY MOUTH EVERY DAY AS NEEDED FOR ANXIETY (Patient not taking: Reported on 2/14/2022)    fluticasone propionate (FLONASE) 50 mcg/actuation nasal spray SPRAY 2 SPRAYS INTO EACH NOSTRIL EVERY DAY (Patient not taking: Reported on 2/14/2022)    ezetimibe (ZETIA) 10 mg tablet Take 1 Tab by mouth daily.  (Patient not taking: Reported on 6/14/2021)     Current Facility-Administered Medications   Medication    bupivacaine (PF) (MARCAINE) 0.5 % (5 mg/mL) injection 10 mg    triamcinolone acetonide (KENALOG-40) 40 mg/mL injection 40 mg    bupivacaine (PF) (MARCAINE) 0.5 % (5 mg/mL) injection 10 mg    triamcinolone acetonide (KENALOG-40) 40 mg/mL injection 40 mg       Past Medical History:   Diagnosis Date  GERD (gastroesophageal reflux disease)     Hypertension     OA (osteoarthritis)     hand    Seasonal allergic rhinitis        Past Surgical History:   Procedure Laterality Date    HX COLONOSCOPY  2009    Dr Melinda Hoffman GYN  2007    D&C and IUD removal    STRESS TEST CARDIAC  2005       Family History   Problem Relation Age of Onset    Hypertension Father     Heart Disease Father 43        MI     Cancer Sister         breast    Dementia Mother     No Known Problems Brother     No Known Problems Sister     Heart Disease Sister 62        MI    No Known Problems Daughter        Social History     Socioeconomic History    Marital status: SINGLE     Spouse name:     Number of children: Not on file    Years of education: Not on file    Highest education level: Not on file   Occupational History    Occupation:  [employer]   Tobacco Use    Smoking status: Never Smoker    Smokeless tobacco: Never Used   Vaping Use    Vaping Use: Never used   Substance and Sexual Activity    Alcohol use: No    Drug use: No    Sexual activity: Not Currently     Partners: Male   Other Topics Concern    Not on file   Social History Narrative    Not on file     Social Determinants of Health     Financial Resource Strain:     Difficulty of Paying Living Expenses: Not on file   Food Insecurity:     Worried About Running Out of Food in the Last Year: Not on file    Daniel of Food in the Last Year: Not on file   Transportation Needs:     Lack of Transportation (Medical): Not on file    Lack of Transportation (Non-Medical):  Not on file   Physical Activity:     Days of Exercise per Week: Not on file    Minutes of Exercise per Session: Not on file   Stress:     Feeling of Stress : Not on file   Social Connections:     Frequency of Communication with Friends and Family: Not on file    Frequency of Social Gatherings with Friends and Family: Not on file    Attends [employer] Services: Not on file   [id number] [employer] or Organizations: Not on file    Attends Club or Organization Meetings: Not on file    Marital Status: Not on file   Intimate Partner Violence:     Fear of Current or Ex-Partner: Not on file    Emotionally Abused: Not on file    Physically Abused: Not on file    Sexually Abused: Not on file   Housing Stability:     Unable to Pay for Housing in the Last Year: Not on file    Number of [place] in the Last Year: Not on file    Unstable Housing in the Last Year: Not on file       Review of Systems    No flowsheet data found. Vitals:  Ht 5' 3\" (1.6 m)   Wt 165 lb (74.8 kg)   BMI 29.23 kg/m²    Body mass index is 29.23 kg/m². An electronic signature was used to authenticate this note.   -- Jermain Kirkpatrick MD

## 2022-07-06 DIAGNOSIS — I10 ESSENTIAL HYPERTENSION, BENIGN: ICD-10-CM

## 2022-07-06 RX ORDER — SPIRONOLACTONE 25 MG/1
TABLET ORAL
Qty: 90 TABLET | Refills: 0 | Status: SHIPPED | OUTPATIENT
Start: 2022-07-06 | End: 2022-10-02

## 2022-09-15 ENCOUNTER — OFFICE VISIT (OUTPATIENT)
Dept: ORTHOPEDIC SURGERY | Age: 77
End: 2022-09-15
Payer: COMMERCIAL

## 2022-09-15 VITALS — BODY MASS INDEX: 27.49 KG/M2 | HEIGHT: 65 IN | WEIGHT: 165 LBS

## 2022-09-15 DIAGNOSIS — M66.242 NONTRAUMATIC SAGITTAL BAND RUPTURE OF EXTENSOR TENDON, LEFT: ICD-10-CM

## 2022-09-15 DIAGNOSIS — M79.642 LEFT HAND PAIN: ICD-10-CM

## 2022-09-15 DIAGNOSIS — M19.042 PRIMARY OSTEOARTHRITIS OF LEFT HAND: Primary | ICD-10-CM

## 2022-09-15 PROCEDURE — 1123F ACP DISCUSS/DSCN MKR DOCD: CPT | Performed by: PHYSICIAN ASSISTANT

## 2022-09-15 PROCEDURE — 99203 OFFICE O/P NEW LOW 30 MIN: CPT | Performed by: PHYSICIAN ASSISTANT

## 2022-09-15 NOTE — PROGRESS NOTES
HPI: Gerardo Hylton (: 1945) is a 69 y.o. female, patient, here for evaluation of the following chief complaint(s): Patient presents with complaint of left hand pain. A couple of days ago, there was a bug on her right leg and she abruptly flicked the bug off with her left middle finger and had sudden onset pain and swelling at the left middle finger MCP joint. She then noticed her extensor tendon was subluxating. She continues to have pain and swelling. She presented to [facility] on 22 where she had x-rays of the left hand and she was ultimately referred to us. No other complaints or concerns. X-rays of the left hand imported into PACS. Hand Pain (Left )       Vitals:  Ht 5' 5\" (1.651 m)   Wt 165 lb (74.8 kg)   BMI 27.46 kg/m²    Body mass index is 27.46 kg/m². Allergies   Allergen Reactions    Diovan [Valsartan] Other (comments)     headaches    Lisinopril Other (comments)     headaches    Pcn [Penicillins] Rash    Statins-Hmg-Coa Reductase Inhibitors Myalgia       Current Outpatient Medications   Medication Sig    spironolactone (ALDACTONE) 25 mg tablet TAKE 1 TABLET BY MOUTH EVERY DAY    busPIRone (BUSPAR) 7.5 mg tablet TAKE 1 TABLET BY MOUTH TWICE A DAY    ALPRAZolam (XANAX) 0.25 mg tablet TAKE 1 TABLET BY MOUTH EVERY DAY AS NEEDED FOR ANXIETY (Patient not taking: Reported on 2022)    fluticasone propionate (FLONASE) 50 mcg/actuation nasal spray SPRAY 2 SPRAYS INTO EACH NOSTRIL EVERY DAY    red yeast rice extract 600 mg cap Take 600 mg by mouth daily. lactobacillus rhamnosus gg 10 billion cell (CULTURELLE) 10 billion cell capsule Take 1 capsule by mouth daily. cholecalciferol, vitamin D3, (VITAMIN D3) 2,000 unit tab Take 1 tablet by mouth daily. aspirin 81 mg tablet Take 81 mg by mouth daily. No current facility-administered medications for this visit.        Past Medical History:   Diagnosis Date    GERD (gastroesophageal reflux disease)     Hypertension     OA (osteoarthritis)     hand    Seasonal allergic rhinitis         Past Surgical History:   Procedure Laterality Date    HX COLONOSCOPY  2009    Dr Belinda Hernandez GYN  2007    D&C and IUD removal    STRESS TEST CARDIAC  2005       Family History   Problem Relation Age of Onset    Hypertension Father     Heart Disease Father 43        MI     Cancer Sister         breast    Dementia Mother     No Known Problems Brother     No Known Problems Sister     Heart Disease Sister 62        MI    No Known Problems Daughter         Social History     Tobacco Use    Smoking status: Never    Smokeless tobacco: Never   Vaping Use    Vaping Use: Never used   Substance Use Topics    Alcohol use: No    Drug use: No        Review of Systems    Constitutional: No fevers, chills, night sweats, excessive fatigue or weight loss. Musculoskeletal: + Left hand pain. Neurologic: No headache, blurred vision, and no areas of focal weakness or numbness. Normal gait. No sensory problems. Respiratory: No dyspnea on exertion, orthopnea, chest pain, cough or hemoptysis. Cardiovascular: No anginal chest pain, irregular heart beat, tachycardia, palpitations or orthopnea  Integumentary: No chronic rashes, inflammation, ulcerations, pruritus, petechiae, purpura, ecchymoses, or skin changes       Physical Exam    General: Alert, cooperative, no distress  Musculosketal: Left hand - Normal sensation. She is able to make a complete fist, however, there is ulnar subluxation/dislocation of the middle finger extensor tendon. There is edema and tenderness over the left middle finger MCP joint. No cysts. Neurologic:  CNII-XII intact, Normal strength, sensation, and reflexes throughout    Imaging: X-rays of the left hand from [facility]  Moderate - severe degenerative joint disease at the digits. There is ulnar subluxation of the middle phalanx of the ring and little fingers.  Joint space narrowing of the carpal bones of the left wrist. No fractures appreciated. ASSESSMENT/PLAN:  Below is the assessment and plan developed based on review of pertinent history, physical exam, labs, studies, and medications. Left hand pain. A couple of days ago, there was a bug on her right leg and she abruptly flicked the bug off with her left middle finger and had sudden onset pain and swelling at the left MCP joints. She then noticed her extensor tendon was subluxating. She continues to have pain and swelling. She presented to [facility]  on 9/14/22 where she had x-rays of the left hand and she was ultimately referred to us. Clinical exam is consistent with sagittal band rupture of the left middle finger. She is a good candidate for an extensor tendon realignment surgery. I reviewed risks that include but are not limited to stiffness, pain, nerve or tendon damage and overall incomplete relief of pain. Arrangements can be made for this to be performed on an outpatient basis at her convenience. 1. Primary osteoarthritis of left hand  2. Left hand pain  3. 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 Dr. Juan F Cheng was available for immediate consult during this encounter. An electronic signature was used to authenticate this note.   -- Jeanne Leigh PA-C

## 2022-09-19 DIAGNOSIS — M66.242 NONTRAUMATIC SAGITTAL BAND RUPTURE OF EXTENSOR TENDON, LEFT: Primary | ICD-10-CM

## 2022-09-22 DIAGNOSIS — M66.242 NONTRAUMATIC SAGITTAL BAND RUPTURE OF EXTENSOR TENDON, LEFT: Primary | ICD-10-CM

## 2022-10-01 DIAGNOSIS — I10 ESSENTIAL HYPERTENSION, BENIGN: ICD-10-CM

## 2022-10-02 RX ORDER — SPIRONOLACTONE 25 MG/1
TABLET ORAL
Qty: 30 TABLET | Refills: 0 | Status: SHIPPED | OUTPATIENT
Start: 2022-10-02 | End: 2022-10-10 | Stop reason: SDUPTHER

## 2022-10-03 NOTE — TELEPHONE ENCOUNTER
Future Appointments   Date Time Provider Brandi Ray   10/10/2022  9:00 AM Flores Otero MD [facility] AMB   10/17/2022  1:45 PM Colleen Egan MD [facility] AMB

## 2022-10-07 DIAGNOSIS — F41.1 GAD (GENERALIZED ANXIETY DISORDER): ICD-10-CM

## 2022-10-07 RX ORDER — BUSPIRONE HYDROCHLORIDE 7.5 MG/1
11.25 TABLET ORAL 2 TIMES DAILY
Qty: 270 TABLET | Refills: 1 | Status: SHIPPED | OUTPATIENT
Start: 2022-10-07

## 2022-10-07 NOTE — PROGRESS NOTES
E-Visit Note:    HPI: per patient questionnaire, this has been reviewed  EXAM: n/a    She has f/u next week. GAD7/PHQ9 reviewed. Will refill medication. Will address at f/u next week.       Future Appointments   Date Time Provider Brandi Flor   10/10/2022  9:00 AM Tariq Milton MD [facility] AMB   10/17/2022  1:45 PM Kina Egan MD [facility] AMB

## 2022-10-10 ENCOUNTER — OFFICE VISIT (OUTPATIENT)
Dept: INTERNAL MEDICINE CLINIC | Age: 77
End: 2022-10-10
Payer: COMMERCIAL

## 2022-10-10 VITALS
BODY MASS INDEX: 29.12 KG/M2 | RESPIRATION RATE: 18 BRPM | TEMPERATURE: 97.1 F | DIASTOLIC BLOOD PRESSURE: 84 MMHG | HEIGHT: 65 IN | WEIGHT: 174.8 LBS | HEART RATE: 62 BPM | OXYGEN SATURATION: 99 % | SYSTOLIC BLOOD PRESSURE: 148 MMHG

## 2022-10-10 DIAGNOSIS — Z53.20 COLON CANCER SCREENING DECLINED: ICD-10-CM

## 2022-10-10 DIAGNOSIS — Z53.20 SCREENING MAMMOGRAPHY DECLINED: ICD-10-CM

## 2022-10-10 DIAGNOSIS — I10 ESSENTIAL HYPERTENSION, BENIGN: Primary | ICD-10-CM

## 2022-10-10 DIAGNOSIS — F41.1 GAD (GENERALIZED ANXIETY DISORDER): ICD-10-CM

## 2022-10-10 DIAGNOSIS — E78.00 PURE HYPERCHOLESTEROLEMIA: ICD-10-CM

## 2022-10-10 LAB
ANION GAP SERPL CALC-SCNC: 3 MMOL/L (ref 5–15)
BUN SERPL-MCNC: 24 MG/DL (ref 6–20)
BUN/CREAT SERPL: 32 (ref 12–20)
CALCIUM SERPL-MCNC: 9.7 MG/DL (ref 8.5–10.1)
CHLORIDE SERPL-SCNC: 105 MMOL/L (ref 97–108)
CO2 SERPL-SCNC: 30 MMOL/L (ref 21–32)
CREAT SERPL-MCNC: 0.76 MG/DL (ref 0.55–1.02)
GLUCOSE SERPL-MCNC: 93 MG/DL (ref 65–100)
POTASSIUM SERPL-SCNC: 4.7 MMOL/L (ref 3.5–5.1)
SODIUM SERPL-SCNC: 138 MMOL/L (ref 136–145)

## 2022-10-10 PROCEDURE — 99214 OFFICE O/P EST MOD 30 MIN: CPT | Performed by: INTERNAL MEDICINE

## 2022-10-10 RX ORDER — SPIRONOLACTONE 50 MG/1
50 TABLET, FILM COATED ORAL DAILY
Qty: 90 TABLET | Refills: 1 | Status: SHIPPED | OUTPATIENT
Start: 2022-10-10

## 2022-10-10 RX ORDER — ALPRAZOLAM 0.25 MG/1
0.25 TABLET ORAL
Qty: 10 TABLET | Refills: 0 | Status: SHIPPED | OUTPATIENT
Start: 2022-10-10

## 2022-10-10 NOTE — ASSESSMENT & PLAN NOTE
Improved, well controlled, continue current treatment. [facility], will [facility] to use for flight only.

## 2022-10-10 NOTE — PROGRESS NOTES
Barry Sanon is a 69 y.o. female who was seen in clinic today (10/10/2022). Assessment & Plan:   Below is the assessment and plan developed based on review of pertinent history, physical exam, labs, studies, and medications. 1. Essential hypertension, benign  Assessment & Plan:  Not ideally controlled, will check labs (as she is overdue) and then increase aldactone. She is aware this is not a strong BP medication and we may need to add in a 2nd agent (would revisit diuretics or start amlopdine). Reviewed ideal home BP goals with her. [place] out [place] 6 [place] so will have her RTC in 4 wks for [place] to repeat BP and check repeat labs   Orders:  -     spironolactone (ALDACTONE) 50 mg tablet; Take 1 Tablet by mouth daily. , Normal, Disp-90 Tablet, R-1  -     METABOLIC PANEL, BASIC; Future  2. DIONI (generalized anxiety disorder)  Assessment & Plan:  Improved, well controlled, continue current treatment. [facility], will refill xanax to use for flight only. Orders:  -     ALPRAZolam (XANAX) 0.25 mg tablet; Take 1 Tablet by mouth daily as needed for Anxiety., Normal, Disp-10 Tablet, R-0Not to exceed 5 additional fills before 12/11/2021  3. Pure hypercholesterolemia  Assessment & Plan:  Above goal, declined medications due to intolerance, will defer labs   4. Colon cancer screening declined  Comments:  never completed from last visit, she reports considering it but not ready to schedule  5. Screening mammography declined  Comments:  overdue, reviewed guidelines, recommended, she will consider & update me if she needs an order    Follow-up and Dispositions    Return in about 1 month (around 11/10/2022) for Nurse visit for blood pressure check. Subjective/Objective: Sigifredo Oquendo was seen today for Hypertension      Since last visit: weight has increased    Mental Health Review  Patient is seen for anxiety. She thinks medication has helped.   She realized certain things are not bothering her as much. She is not using xanax at all. Reports experiences the following side effects from the treatment: she is not sleeping as well. Normally goes to bed w/o any issues but since starting it she is having trouble getting to sleep. [facility]. Last [facility] on 11/9/21 or 10 tabs. She will be flying this fall and is asking to use it then. Cardiovascular Review  The patient has hypertension. She reports taking medications as instructed, no medication side effects noted, home BP monitoring in range of 922-179'G systolic. She not attempting to follow a low sodium diet but can't avoid it, exercises regularly. Prior to Admission medications    Medication Sig Start Date End Date Taking? Authorizing Provider   busPIRone (BUSPAR) 7.5 mg tablet Take 1.5 Tablets by mouth two (2) times a day. 10/7/22  Yes Brooklyn Angela MD   spironolactone (ALDACTONE) 25 mg tablet TAKE 1 TABLET BY MOUTH EVERY DAY 10/2/22  Yes Brooklyn Angela MD   ALPRAZolam Martín Shon) 0.25 mg tablet TAKE 1 TABLET BY MOUTH EVERY DAY AS NEEDED FOR ANXIETY  Patient taking differently: 0.25 mg as needed. 11/8/21  Yes Brooklyn Angela MD   fluticasone propionate (FLONASE) 50 mcg/actuation nasal spray SPRAY 2 SPRAYS INTO EACH NOSTRIL EVERY DAY 7/7/21  Yes Brooklyn Angela MD   red yeast rice extract 600 mg cap Take 600 mg by mouth daily. Yes Provider, Historical   lactobacillus rhamnosus gg 10 billion cell (CULTURELLE) 10 billion cell capsule Take 1 capsule by mouth daily. Yes Provider, Historical   cholecalciferol, vitamin D3, 50 mcg (2,000 unit) tab Take 1 tablet by mouth daily. Yes Provider, Historical   aspirin 81 mg tablet Take 81 mg by mouth daily. Yes Provider, Historical        Review of Systems   Respiratory:  Negative for cough and shortness of breath. Cardiovascular:  Negative for chest pain and palpitations.    Gastrointestinal:  Negative for abdominal pain, constipation, diarrhea, nausea and vomiting. Musculoskeletal:  Positive for joint pain. Physical Exam  Cardiovascular:      Rate and Rhythm: Regular rhythm. Heart sounds: Normal heart sounds. No murmur heard. Pulmonary:      Effort: Pulmonary effort is normal.      Breath sounds: Normal breath sounds. No wheezing or rales. Abdominal:      General: Bowel sounds are normal.      Palpations: There is no hepatomegaly, splenomegaly or mass. Tenderness: There is no abdominal tenderness. Musculoskeletal:      Right lower leg: No edema. Left lower leg: No edema.    Psychiatric:         Mood and Affect: Mood normal.         Behavior: Behavior normal.     Visit Vitals  BP (!) 148/84   Pulse 62   Temp 97.1 °F (36.2 °C) (Temporal)   Resp 18   Ht 5' 5\" (1.651 m)   Wt 174 lb 12.8 oz (79.3 kg)   SpO2 99%   BMI 29.09 kg/m²       Alla Berry MD

## 2022-10-10 NOTE — ASSESSMENT & PLAN NOTE
Not ideally controlled, will check labs (as she is overdue) and then increase aldactone. She is aware this is not a strong BP medication and we may need to add in a 2nd agent (would revisit diuretics or start amlopdine). Reviewed ideal home BP goals with her.   She is leaving to out of the country in 6 wks so will have her RTC in 4 wks for [place] to repeat BP and check repeat labs

## 2022-10-17 ENCOUNTER — OFFICE VISIT (OUTPATIENT)
Dept: ORTHOPEDIC SURGERY | Age: 77
End: 2022-10-17
Payer: COMMERCIAL

## 2022-10-17 VITALS — HEIGHT: 65 IN | WEIGHT: 174 LBS | BODY MASS INDEX: 28.99 KG/M2

## 2022-10-17 DIAGNOSIS — M25.561 CHRONIC PAIN OF RIGHT KNEE: Primary | ICD-10-CM

## 2022-10-17 DIAGNOSIS — M17.11 ARTHRITIS OF KNEE, RIGHT: ICD-10-CM

## 2022-10-17 DIAGNOSIS — G89.29 CHRONIC PAIN OF RIGHT KNEE: Primary | ICD-10-CM

## 2022-10-17 DIAGNOSIS — M17.12 ARTHRITIS OF LEFT KNEE: ICD-10-CM

## 2022-10-17 PROCEDURE — 20610 DRAIN/INJ JOINT/BURSA W/O US: CPT | Performed by: ORTHOPAEDIC SURGERY

## 2022-10-17 RX ORDER — TRIAMCINOLONE ACETONIDE 40 MG/ML
40 INJECTION, SUSPENSION INTRA-ARTICULAR; INTRAMUSCULAR ONCE
Status: COMPLETED | OUTPATIENT
Start: 2022-10-17 | End: 2022-10-17

## 2022-10-17 RX ADMIN — TRIAMCINOLONE ACETONIDE 40 MG: 40 INJECTION, SUSPENSION INTRA-ARTICULAR; INTRAMUSCULAR at 14:32

## 2022-10-17 NOTE — PROGRESS NOTES
Rose Pemberton (: 1945) is a 69 y.o. female patient, here for evaluation of the following chief complaint(s):  Knee Pain (Bilateral knee pain/)       ASSESSMENT/PLAN:  Below is the assessment and plan developed based on review of pertinent history, physical exam, labs, studies, and medications. 78-year-old female comes in today complaining of bilateral knee pain right worse than left. She is been seeing my partner for the same issue for more than a year. She gets injections every 4 to 5 months. She said that these helped for at least 4 months. She has gained about 8 or 9 pounds and says is been worse recently. The pain is rated as moderate while twisting. She has minimal pain while straightening her knee fully going up and down stairs while standing upright. Mild pain while rising from sitting. She has moderate stiffness in the morning. Her last injection was 4 months ago. We discussed options. We are going to continue with a conservative route. I injected 40 mg triamcinolone into the right knee joint using sterile technique. She tolerated very well. We also discussed quad strengthening. Plan for follow-up as needed. We also did touch on potential future surgery but we will continue the conservative route for now      1. Chronic pain of right knee  -     XR KNEE RT MIN 4 V; Future  2. Arthritis of left knee  3. Arthritis of knee, right      Encounter Diagnoses   Name Primary? Chronic pain of right knee Yes    Arthritis of left knee     Arthritis of knee, right         No follow-ups on file. SUBJECTIVE/OBJECTIVE:  Rose Pemberton (: 1945) is a 69 y.o. female who presents today for the following:  Chief Complaint   Patient presents with    Knee Pain     Bilateral knee pain         78-year-old female comes in today complaining of bilateral knee pain right worse than left. She is been seeing my partner for the same issue for more than a year. She gets injections every 4 to 5 months. She said that these helped for at least 4 months. She has gained about 8 or 9 pounds and says is been worse recently. The pain is rated as moderate while twisting. She has minimal pain while straightening her knee fully going up and down stairs while standing upright. Mild pain while rising from sitting. She has moderate stiffness in the morning. IMAGING:  XR Results (most recent):  Results from Appointment encounter on 10/17/22    XR KNEE RT MIN 4 V    Narrative  4 views right knee ordered and intimately reviewed. She has tricompartmental knee osteoarthritis with significant medial joint space narrowing osteophyte formation present. She has osteophytes laterally as well as patella maltracking with moderate patellofemoral osteoarthritis. Allergies   Allergen Reactions    Diovan [Valsartan] Other (comments)     headaches    Lisinopril Other (comments)     headaches    Pcn [Penicillins] Rash    Statins-Hmg-Coa Reductase Inhibitors Myalgia       Current Outpatient Medications   Medication Sig    spironolactone (ALDACTONE) 50 mg tablet Take 1 Tablet by mouth daily. ALPRAZolam (XANAX) 0.25 mg tablet Take 1 Tablet by mouth daily as needed for Anxiety. busPIRone (BUSPAR) 7.5 mg tablet Take 1.5 Tablets by mouth two (2) times a day. fluticasone propionate (FLONASE) 50 mcg/actuation nasal spray SPRAY 2 SPRAYS INTO EACH NOSTRIL EVERY DAY    red yeast rice extract 600 mg cap Take 600 mg by mouth daily. lactobacillus rhamnosus gg 10 billion cell (CULTURELLE) 10 billion cell capsule Take 1 capsule by mouth daily. cholecalciferol, vitamin D3, 50 mcg (2,000 unit) tab Take 1 tablet by mouth daily. aspirin 81 mg tablet Take 81 mg by mouth daily. No current facility-administered medications for this visit.        Past Medical History:   Diagnosis Date    GERD (gastroesophageal reflux disease)     Hypertension     OA (osteoarthritis)     hand    Seasonal allergic rhinitis         Past Surgical History:   Procedure Laterality Date    HX COLONOSCOPY  2009    Dr Raffy Pena GYN  2007    D&C and IUD removal    STRESS TEST CARDIAC  2005       Family History   Problem Relation Age of Onset    Hypertension Father     Heart Disease Father 43        MI     OSTEOARTHRITIS Father     Cancer Sister         breast    Dementia Mother     No Known Problems Brother     No Known Problems Sister     Heart Disease Sister 62        MI    No Known Problems Daughter         Social History     Tobacco Use    Smoking status: Never    Smokeless tobacco: Never   Substance Use Topics    Alcohol use: No        All systems reviewed x 12 and were negative with the exception of None      No flowsheet data found. Vitals:  Ht 5' 5\" (1.651 m)   Wt 174 lb (78.9 kg)   BMI 28.96 kg/m²    Body mass index is 28.96 kg/m². Physical Exam    General: NAD, well developed, well nourished, alert and oriented x 3. Cardiac: Extremities well perfused    Respiratory: Nonlabored breathing    LLE:  Mild antalgic gait. Mild effusion noted. No previous incisions noted. ROM 0-110 degrees. Grossly stable to varus/valgus stress and anterior/posterior drawer tests. Medial lateral joint tenderness motor grossly intact. RLE: Mild antalgic gait. Mild effusion noted. No previous incisions noted. ROM 0-110 degrees. Grossly stable to varus/valgus stress and anterior/posterior drawer tests. Medial lateral joint tenderness motor grossly intact. Vascular: Palpable pedal pulses, equal bilaterally. Skin: Warm well perfused, cap refill < 2 sec. An electronic signature was used to authenticate this note.   -- Cecile Palm MD

## 2022-12-14 ENCOUNTER — OFFICE VISIT (OUTPATIENT)
Dept: ORTHOPEDIC SURGERY | Age: 77
End: 2022-12-14
Payer: COMMERCIAL

## 2022-12-14 VITALS — HEIGHT: 64 IN | BODY MASS INDEX: 28.68 KG/M2 | WEIGHT: 168 LBS

## 2022-12-14 DIAGNOSIS — M66.242 NONTRAUMATIC SAGITTAL BAND RUPTURE OF EXTENSOR TENDON, LEFT: Primary | ICD-10-CM

## 2022-12-14 NOTE — PROGRESS NOTES
Eloy Fang (: 1945) is a 69 y.o. female patient here for evaluation of the following chief complaint(s):  Hand Pain (Left hand)       ASSESSMENT/PLAN:  Below is the assessment and plan developed based on review of pertinent history, physical exam, labs, studies, and medications. 1. 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 What we could try is a P1 blocking splint with a little bit of hyperextension of the MP joint which on exam keep the tendon a little bit better centralized. She can try that for 6 to 8 weeks and if no better and wants to consider surgery she can call for follow-up. Patient verbalized understanding and elected to proceed. All questions were answered to the patient's apparent satisfaction. SUBJECTIVE/OBJECTIVE:  HPI    Patient was flicking a bug 3 months ago when she had an injury to the middle finger MP joint. She saw [facility] and then went to occupational therapy seeing [facility]. She still has subluxation of the tendon but has no pain whatsoever at this point. She is happy with her function at this point. Here to evaluate whether or not she needs surgery. Patient reports a sudden onset of symptoms. Duration of problem 3 months. Symptom Severity 2/10  Symptom Frequency intermittent        Allergies   Allergen Reactions    Diovan [Valsartan] Other (comments)     headaches    Lisinopril Other (comments)     headaches    Pcn [Penicillins] Rash    Statins-Hmg-Coa Reductase Inhibitors Myalgia       Current Outpatient Medications   Medication Sig    spironolactone (ALDACTONE) 50 mg tablet Take 1 Tablet by mouth daily. ALPRAZolam (XANAX) 0.25 mg tablet Take 1 Tablet by mouth daily as needed for Anxiety.     busPIRone (BUSPAR) 7.5 mg tablet Take 1.5 Tablets by mouth two (2) times a day.    fluticasone propionate (FLONASE) 50 mcg/actuation nasal spray SPRAY 2 SPRAYS INTO EACH NOSTRIL EVERY DAY    red yeast rice extract 600 mg cap Take 600 mg by mouth daily. lactobacillus rhamnosus gg 10 billion cell (CULTURELLE) 10 billion cell capsule Take 1 capsule by mouth daily. cholecalciferol, vitamin D3, 50 mcg (2,000 unit) tab Take 1 tablet by mouth daily. aspirin 81 mg tablet Take 81 mg by mouth daily. No current facility-administered medications for this visit. Social History     Socioeconomic History    Marital status: SINGLE     Spouse name:     Number of children: Not on file    Years of education: Not on file    Highest education level: Not on file   Occupational History    Occupation:  [employer]   Tobacco Use    Smoking status: Never    Smokeless tobacco: Never   Vaping Use    Vaping Use: Never used   Substance and Sexual Activity    Alcohol use: No    Drug use: No    Sexual activity: Not Currently     Partners: Male   Other Topics Concern    Not on file   Social History Narrative    Not on file     Social Determinants of Health     Financial Resource Strain: Not on file   Food Insecurity: Not on file   Transportation Needs: Not on file   Physical Activity: Not on file   Stress: Not on file   Social Connections: Not on file   Intimate Partner Violence: Not on file   Housing Stability: Not on file       Past Surgical History:   Procedure Laterality Date    HX COLONOSCOPY  2009    Dr Leila Ken GYN  2007    D&C and IUD removal    STRESS TEST CARDIAC  2005       Family History   Problem Relation Age of Onset    Hypertension Father     Heart Disease Father 43        MI     OSTEOARTHRITIS Father     Cancer Sister         breast    Dementia Mother     No Known Problems Brother     No Known Problems Sister     Heart Disease Sister 62        MI    No Known Problems Daughter             Review of Systems    No flowsheet data found.         Vitals:  Ht 5' 4\" (1.626 m)   Wt 168 lb (76.2 kg)   BMI 28.84 kg/m²    Estimated body surface area is 1.85 meters squared as calculated from the following:    Height as of this encounter: 5' 4\" (1.626 m). Weight as of this encounter: 168 lb (76.2 kg). Body mass index is 28.84 kg/m². Physical Exam    Musculoskeletal Exam:    Left Upper Extremity EXAMINATION    Patient has no tenderness to palpation at the MP joint. With flexion of the MP joint she does have subluxation of the tendon ulnarly. Has no pain whatsoever with this. Lacks full range of motion due to severe arthritis in other digits. Patient fires AIN, PIN and ulnar nerves. Sensation is grossly intact in the median, radial and ulnar distribution. Hand is pink and appears well-perfused. Hand is warm. Skin is intact. Compartments are soft and compressible. Consitutional: Healthy  Skin:   - Edema - none  - Cellulitis - No    Neuro: Numbness or tingling in R/L arm: No    Psych: Affect normal    Cardiovascular: Capillary Refill < 2 seconds in upper extremities    Respiratory: Non-Labored Breathing    ROS:    Constitutional: Denies fever/chills    Respiratory: Denies SOB        Imaging:    I reviewed images today showing severe PIP joint arthritis and DIP arthritis of the ring and small fingers of the left hand. Also thumb CMC arthritis is noted. She is also ulnar +2 mm. IP joint arthritis at the thumb. DIP arthritis of the index and middle finger. Index and middle finger do not have any significant MP or PIP arthritis. Orders Placed This Encounter    REFERRAL TO OCCUPATIONAL THERAPY     Referral Priority:   Routine     Referral Type:   PT/OT/ST     Referral Reason:   Specialty Services Required     Number of Visits Requested:   1              An electronic signature was used to authenticate this note.   -- Gill Martin MD

## 2023-03-19 DIAGNOSIS — I10 ESSENTIAL HYPERTENSION, BENIGN: ICD-10-CM

## 2023-03-19 DIAGNOSIS — F41.1 GAD (GENERALIZED ANXIETY DISORDER): ICD-10-CM

## 2023-03-19 RX ORDER — SPIRONOLACTONE 50 MG/1
TABLET, FILM COATED ORAL
Qty: 90 TABLET | Refills: 0 | Status: SHIPPED | OUTPATIENT
Start: 2023-03-19

## 2023-03-19 RX ORDER — BUSPIRONE HYDROCHLORIDE 7.5 MG/1
11.25 TABLET ORAL 2 TIMES DAILY
Qty: 270 TABLET | Refills: 0 | Status: SHIPPED | OUTPATIENT
Start: 2023-03-19

## 2023-03-20 ENCOUNTER — OFFICE VISIT (OUTPATIENT)
Dept: ORTHOPEDIC SURGERY | Age: 78
End: 2023-03-20
Payer: COMMERCIAL

## 2023-03-20 VITALS — HEIGHT: 64 IN | BODY MASS INDEX: 28.68 KG/M2 | WEIGHT: 168 LBS

## 2023-03-20 DIAGNOSIS — M17.12 ARTHRITIS OF LEFT KNEE: ICD-10-CM

## 2023-03-20 DIAGNOSIS — M17.11 ARTHRITIS OF RIGHT KNEE: Primary | ICD-10-CM

## 2023-03-20 PROCEDURE — 99214 OFFICE O/P EST MOD 30 MIN: CPT | Performed by: PHYSICIAN ASSISTANT

## 2023-03-20 PROCEDURE — 20610 DRAIN/INJ JOINT/BURSA W/O US: CPT | Performed by: PHYSICIAN ASSISTANT

## 2023-03-20 PROCEDURE — 1123F ACP DISCUSS/DSCN MKR DOCD: CPT | Performed by: PHYSICIAN ASSISTANT

## 2023-03-20 RX ORDER — MELOXICAM 7.5 MG/1
7.5 TABLET ORAL DAILY
Qty: 30 TABLET | Refills: 0 | Status: SHIPPED | OUTPATIENT
Start: 2023-03-20

## 2023-03-20 RX ORDER — METHYLPREDNISOLONE ACETATE 40 MG/ML
80 INJECTION, SUSPENSION INTRA-ARTICULAR; INTRALESIONAL; INTRAMUSCULAR; SOFT TISSUE ONCE
Status: COMPLETED | OUTPATIENT
Start: 2023-03-20 | End: 2023-03-20

## 2023-03-20 RX ORDER — METHYLPREDNISOLONE ACETATE 40 MG/ML
40 INJECTION, SUSPENSION INTRA-ARTICULAR; INTRALESIONAL; INTRAMUSCULAR; SOFT TISSUE ONCE
Status: COMPLETED | OUTPATIENT
Start: 2023-03-20 | End: 2023-03-20

## 2023-03-20 RX ADMIN — METHYLPREDNISOLONE ACETATE 80 MG: 40 INJECTION, SUSPENSION INTRA-ARTICULAR; INTRALESIONAL; INTRAMUSCULAR; SOFT TISSUE at 15:18

## 2023-03-20 RX ADMIN — METHYLPREDNISOLONE ACETATE 40 MG: 40 INJECTION, SUSPENSION INTRA-ARTICULAR; INTRALESIONAL; INTRAMUSCULAR; SOFT TISSUE at 15:18

## 2023-03-20 NOTE — TELEPHONE ENCOUNTER
Future Appointments   Date Time Provider Brandi Flor   3/20/2023  2:30 PM Kevin Egan MD [facility] AMB   4/10/2023  9:20 AM MD GEORGINA Del Rosario BS AMB

## 2023-03-20 NOTE — PROGRESS NOTES
Twin Rincon (: 1945) is a 69 y.o. female patient, here for evaluation of the following chief complaint(s):  Knee Pain (Bilateral knee pain/)       ASSESSMENT/PLAN:  Below is the assessment and plan developed based on review of pertinent history, physical exam, labs, studies, and medications. 81-year-old female comes in today for follow-up. She has bilateral knee pain right slightly worse than left. She is been struggling with it for years. She has known osteoarthritic changes bilaterally. Rates the pain as moderate in nature. Affects her daily. Symptoms consistent with acute exacerbation of osteoarthritic change. Discussed treatment options with patient. She would like to try steroid injection. After verbal consent was obtained I injected  3mL Lidocaine and 40 mg DepoMedrol into the right knee joint using sterile technique. Patient tolerated well. After verbal consent was obtained I injected  3mL Lidocaine 40 mg DepoMedrol into the left knee joint using sterile technique. Patient tolerated well. Encouraged at home physical therapy exercises. Prescription given for meloxicam 7.5 mg to take daily as needed for discomfort. Risks and benefits of medication discussed with patient. Patient verbalizes understanding. Plan is to follow-up in 5 months for repeat check if she is going on a  safari there after. Discussed with patient that her meloxicam works well we can give her another refill for her trip. 1. Arthritis of right knee  -     DRAIN/INJECT LARGE JOINT/BURSA  -     methylPREDNISolone acetate (DEPO-MEDROL) 40 mg/mL injection 40 mg; 40 mg, Intra artICUlar, ONCE, 1 dose, On Mon 3/20/23 at 1600  -     meloxicam (MOBIC) 7.5 mg tablet; Take 1 Tablet by mouth daily. , Normal, Disp-30 Tablet, R-0  2.  Arthritis of left knee  -     DRAIN/INJECT LARGE JOINT/BURSA  -     methylPREDNISolone acetate (DEPO-MEDROL) 40 mg/mL injection 80 mg; 80 mg, Intra artICUlar, ONCE, 1 dose, On Mon 3/20/23 at 1600  -     meloxicam (MOBIC) 7.5 mg tablet; Take 1 Tablet by mouth daily. , Normal, Disp-30 Tablet, R-0      Encounter Diagnoses   Name Primary? Arthritis of right knee Yes    Arthritis of left knee         No follow-ups on file. SUBJECTIVE/OBJECTIVE:  Marichuy Ny (: 1945) is a 69 y.o. female who presents today for the following:  Chief Complaint   Patient presents with    Knee Pain     Bilateral knee pain         52-year-old female comes in today for bilateral knee pain. Rates the pain as moderate. She has moderate stiffness in the morning. Moderate pain twisting pivoting her knee. She has mild pain fully straightening going up and down stairs rising from sitting and bending the floor to  an object. Affects her daily. Has worsened over the last couple of months. IMAGING:  XR Results (most recent):  Results from Appointment encounter on 10/17/22    XR KNEE RT MIN 4 V    Narrative  4 views right knee ordered and intimately reviewed. She has tricompartmental knee osteoarthritis with significant medial joint space narrowing osteophyte formation present. She has osteophytes laterally as well as patella maltracking with moderate patellofemoral osteoarthritis. Allergies   Allergen Reactions    Diovan [Valsartan] Other (comments)     headaches    Lisinopril Other (comments)     headaches    Pcn [Penicillins] Rash    Statins-Hmg-Coa Reductase Inhibitors Myalgia       Current Outpatient Medications   Medication Sig    meloxicam (MOBIC) 7.5 mg tablet Take 1 Tablet by mouth daily. busPIRone (BUSPAR) 7.5 mg tablet TAKE 1.5 TABLETS BY MOUTH TWO (2) TIMES A DAY. spironolactone (ALDACTONE) 50 mg tablet TAKE 1 TABLET BY MOUTH EVERY DAY    ALPRAZolam (XANAX) 0.25 mg tablet Take 1 Tablet by mouth daily as needed for Anxiety.     fluticasone propionate (FLONASE) 50 mcg/actuation nasal spray SPRAY 2 SPRAYS INTO EACH NOSTRIL EVERY DAY    red yeast rice extract 600 mg cap Take 600 mg by mouth daily. lactobacillus rhamnosus gg 10 billion cell (CULTURELLE) 10 billion cell capsule Take 1 capsule by mouth daily. cholecalciferol, vitamin D3, 50 mcg (2,000 unit) tab Take 1 tablet by mouth daily. aspirin 81 mg tablet Take 81 mg by mouth daily. Current Facility-Administered Medications   Medication    methylPREDNISolone acetate (DEPO-MEDROL) 40 mg/mL injection 40 mg    methylPREDNISolone acetate (DEPO-MEDROL) 40 mg/mL injection 80 mg       Past Medical History:   Diagnosis Date    GERD (gastroesophageal reflux disease)     Hypertension     OA (osteoarthritis)     hand    Seasonal allergic rhinitis         Past Surgical History:   Procedure Laterality Date    HX COLONOSCOPY  2009    Dr Michael Herzog GYN  2007    D&C and IUD removal    STRESS TEST CARDIAC  2005       Family History   Problem Relation Age of Onset    Hypertension Father     Heart Disease Father 43        MI     OSTEOARTHRITIS Father     Cancer Sister         breast    Dementia Mother     No Known Problems Brother     No Known Problems Sister     Heart Disease Sister 62        MI    No Known Problems Daughter         Social History     Tobacco Use    Smoking status: Never    Smokeless tobacco: Never   Substance Use Topics    Alcohol use: No        All systems reviewed x 12 and were negative with the exception of None      No flowsheet data found. Vitals:  Ht 5' 4\" (1.626 m)   Wt 168 lb (76.2 kg)   BMI 28.84 kg/m²    Body mass index is 28.84 kg/m². Physical Exam    General: NAD, well developed, well nourished, alert and oriented x 3. Cardiac: Extremities well perfused    Respiratory: Nonlabored breathing    LLE: Normal gait and station. Negative stinchfield. No effusion noted. No previous incisions noted. ROM 0-120 degrees. Grossly stable to varus/valgus stress and anterior/posterior drawer tests. Medial lateral joint line tenderness. Motor grossly intact. RLE: Normal gait and station. Negative stinchfield. No effusion noted. No previous incisions noted. ROM 0-120 degrees. Grossly stable to varus/valgus stress and anterior/posterior drawer tests. Medial lateral joint line tenderness. Motor grossly intact. Vascular: Palpable pedal pulses, equal bilaterally. Skin: Warm well perfused, cap refill < 2 sec. Antoni Hewitt M.D. was available for immediate consultation as the supervising physician. An electronic signature was used to authenticate this note.   -- Fredi Redding PA-C

## 2023-06-20 DIAGNOSIS — I10 ESSENTIAL (PRIMARY) HYPERTENSION: ICD-10-CM

## 2023-06-20 RX ORDER — SPIRONOLACTONE 50 MG/1
TABLET, FILM COATED ORAL
Qty: 90 TABLET | Refills: 0 | Status: SHIPPED | OUTPATIENT
Start: 2023-06-20

## 2023-07-10 DIAGNOSIS — F41.1 GENERALIZED ANXIETY DISORDER: ICD-10-CM

## 2023-07-10 RX ORDER — BUSPIRONE HYDROCHLORIDE 7.5 MG/1
TABLET ORAL
Qty: 270 TABLET | Refills: 0 | Status: SHIPPED | OUTPATIENT
Start: 2023-07-10

## 2023-07-10 NOTE — TELEPHONE ENCOUNTER
Chief Complaint   Patient presents with    Medication Refill     Last Appointment with Dr. Rodgers Salty: 10/10/22    Future Appointments   Date Time Provider [address] [place] [address]   8/14/2023 12:20 Candance Jersey, MD [facility] BS AMB Harle Eke

## 2023-08-14 ENCOUNTER — OFFICE VISIT (OUTPATIENT)
Age: 78
End: 2023-08-14
Payer: COMMERCIAL

## 2023-08-14 VITALS
WEIGHT: 176.8 LBS | DIASTOLIC BLOOD PRESSURE: 84 MMHG | HEIGHT: 64 IN | OXYGEN SATURATION: 100 % | BODY MASS INDEX: 30.19 KG/M2 | SYSTOLIC BLOOD PRESSURE: 141 MMHG | HEART RATE: 81 BPM | RESPIRATION RATE: 16 BRPM | TEMPERATURE: 97.8 F

## 2023-08-14 DIAGNOSIS — Z78.0 POST-MENOPAUSAL: ICD-10-CM

## 2023-08-14 DIAGNOSIS — I10 ESSENTIAL HYPERTENSION, BENIGN: Primary | ICD-10-CM

## 2023-08-14 DIAGNOSIS — F41.1 GAD (GENERALIZED ANXIETY DISORDER): ICD-10-CM

## 2023-08-14 DIAGNOSIS — E78.00 PURE HYPERCHOLESTEROLEMIA: ICD-10-CM

## 2023-08-14 PROCEDURE — 1123F ACP DISCUSS/DSCN MKR DOCD: CPT | Performed by: INTERNAL MEDICINE

## 2023-08-14 PROCEDURE — 3077F SYST BP >= 140 MM HG: CPT | Performed by: INTERNAL MEDICINE

## 2023-08-14 PROCEDURE — 99214 OFFICE O/P EST MOD 30 MIN: CPT | Performed by: INTERNAL MEDICINE

## 2023-08-14 PROCEDURE — 3079F DIAST BP 80-89 MM HG: CPT | Performed by: INTERNAL MEDICINE

## 2023-08-14 RX ORDER — BUSPIRONE HYDROCHLORIDE 7.5 MG/1
TABLET ORAL
Qty: 270 TABLET | Refills: 0
Start: 2023-08-14

## 2023-08-14 RX ORDER — ALPRAZOLAM 0.25 MG/1
0.25 TABLET ORAL DAILY PRN
Qty: 10 TABLET | Refills: 0 | Status: SHIPPED | OUTPATIENT
Start: 2023-08-14 | End: 2023-08-24

## 2023-08-14 SDOH — ECONOMIC STABILITY: HOUSING INSECURITY
IN THE LAST 12 MONTHS, WAS THERE A TIME WHEN YOU DID NOT HAVE A STEADY PLACE TO SLEEP OR SLEPT IN A SHELTER (INCLUDING NOW)?: NO

## 2023-08-14 SDOH — ECONOMIC STABILITY: INCOME INSECURITY: HOW HARD IS IT FOR YOU TO PAY FOR THE VERY BASICS LIKE FOOD, HOUSING, MEDICAL CARE, AND HEATING?: NOT HARD AT ALL

## 2023-08-14 SDOH — ECONOMIC STABILITY: FOOD INSECURITY: WITHIN THE PAST 12 MONTHS, THE FOOD YOU BOUGHT JUST DIDN'T LAST AND YOU DIDN'T HAVE MONEY TO GET MORE.: NEVER TRUE

## 2023-08-14 SDOH — ECONOMIC STABILITY: FOOD INSECURITY: WITHIN THE PAST 12 MONTHS, YOU WORRIED THAT YOUR FOOD WOULD RUN OUT BEFORE YOU GOT MONEY TO BUY MORE.: NEVER TRUE

## 2023-08-14 ASSESSMENT — ENCOUNTER SYMPTOMS
ABDOMINAL PAIN: 0
BLOOD IN STOOL: 0
VOMITING: 0
CONSTIPATION: 0
SHORTNESS OF BREATH: 0
DIARRHEA: 0
NAUSEA: 0
COUGH: 0

## 2023-08-14 ASSESSMENT — PATIENT HEALTH QUESTIONNAIRE - PHQ9
SUM OF ALL RESPONSES TO PHQ QUESTIONS 1-9: 0
1. LITTLE INTEREST OR PLEASURE IN DOING THINGS: 0
SUM OF ALL RESPONSES TO PHQ QUESTIONS 1-9: 0
SUM OF ALL RESPONSES TO PHQ9 QUESTIONS 1 & 2: 0
SUM OF ALL RESPONSES TO PHQ QUESTIONS 1-9: 0
2. FEELING DOWN, DEPRESSED OR HOPELESS: 0
SUM OF ALL RESPONSES TO PHQ QUESTIONS 1-9: 0
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
## 2023-08-14 NOTE — ASSESSMENT & PLAN NOTE
Not at goal, intolerant to statin & zetia, is tolerating red yeast rice, no changes at this time pending review of labs

## 2023-08-14 NOTE — ASSESSMENT & PLAN NOTE
stable and well controlled, I reviewed treatment options with her, I reviewed life style changes to help improve mood, continue current treatment. Xanax refilled to use with travel.   Stress is not something medications can help so will stick with 1 tab BID

## 2023-08-15 LAB
ALBUMIN SERPL-MCNC: 4.5 G/DL (ref 3.5–5)
ALBUMIN/GLOB SERPL: 1.7 (ref 1.1–2.2)
ALP SERPL-CCNC: 84 U/L (ref 45–117)
ALT SERPL-CCNC: 17 U/L (ref 12–78)
ANION GAP SERPL CALC-SCNC: 5 MMOL/L (ref 5–15)
AST SERPL-CCNC: 14 U/L (ref 15–37)
BILIRUB SERPL-MCNC: 0.8 MG/DL (ref 0.2–1)
BUN SERPL-MCNC: 27 MG/DL (ref 6–20)
BUN/CREAT SERPL: 28 (ref 12–20)
CALCIUM SERPL-MCNC: 10.1 MG/DL (ref 8.5–10.1)
CHLORIDE SERPL-SCNC: 106 MMOL/L (ref 97–108)
CHOLEST SERPL-MCNC: 214 MG/DL
CO2 SERPL-SCNC: 25 MMOL/L (ref 21–32)
CREAT SERPL-MCNC: 0.97 MG/DL (ref 0.55–1.02)
ERYTHROCYTE [DISTWIDTH] IN BLOOD BY AUTOMATED COUNT: 12.8 % (ref 11.5–14.5)
GLOBULIN SER CALC-MCNC: 2.7 G/DL (ref 2–4)
GLUCOSE SERPL-MCNC: 99 MG/DL (ref 65–100)
HCT VFR BLD AUTO: 44.4 % (ref 35–47)
HDLC SERPL-MCNC: 66 MG/DL
HDLC SERPL: 3.2 (ref 0–5)
HGB BLD-MCNC: 14.6 G/DL (ref 11.5–16)
LDLC SERPL CALC-MCNC: 130 MG/DL (ref 0–100)
MCH RBC QN AUTO: 30.9 PG (ref 26–34)
MCHC RBC AUTO-ENTMCNC: 32.9 G/DL (ref 30–36.5)
MCV RBC AUTO: 94.1 FL (ref 80–99)
NRBC # BLD: 0 K/UL (ref 0–0.01)
NRBC BLD-RTO: 0 PER 100 WBC
PLATELET # BLD AUTO: 220 K/UL (ref 150–400)
PMV BLD AUTO: 11 FL (ref 8.9–12.9)
POTASSIUM SERPL-SCNC: 4.5 MMOL/L (ref 3.5–5.1)
PROT SERPL-MCNC: 7.2 G/DL (ref 6.4–8.2)
RBC # BLD AUTO: 4.72 M/UL (ref 3.8–5.2)
SODIUM SERPL-SCNC: 136 MMOL/L (ref 136–145)
TRIGL SERPL-MCNC: 90 MG/DL
VLDLC SERPL CALC-MCNC: 18 MG/DL
WBC # BLD AUTO: 5.9 K/UL (ref 3.6–11)

## 2023-08-15 NOTE — RESULT ENCOUNTER NOTE
Results released to patient via [email or web address]. All labs are stable or at goal for her, except for elevated lipids (stable).

## 2023-10-26 DIAGNOSIS — I10 ESSENTIAL (PRIMARY) HYPERTENSION: ICD-10-CM

## 2023-10-27 RX ORDER — SPIRONOLACTONE 50 MG/1
50 TABLET, FILM COATED ORAL DAILY
Qty: 90 TABLET | Refills: 1 | Status: SHIPPED | OUTPATIENT
Start: 2023-10-27

## 2023-10-27 NOTE — TELEPHONE ENCOUNTER
Please call patient. She was supposed to RTC in Sept for a NV to check BP as it was high at last visit. Never did. Needs to schedule a NV.     Future Appointments   Date Time Provider [address]   2/16/2024  8:00 AM Salma Thompson MD [facility] AMB

## 2024-01-11 DIAGNOSIS — I10 ESSENTIAL (PRIMARY) HYPERTENSION: ICD-10-CM

## 2024-01-11 RX ORDER — SPIRONOLACTONE 50 MG/1
50 TABLET, FILM COATED ORAL DAILY
Qty: 90 TABLET | Refills: 1 | OUTPATIENT
Start: 2024-01-11

## 2024-07-15 DIAGNOSIS — F41.1 GAD (GENERALIZED ANXIETY DISORDER): ICD-10-CM

## 2024-07-15 DIAGNOSIS — I10 ESSENTIAL (PRIMARY) HYPERTENSION: ICD-10-CM

## 2024-07-15 RX ORDER — BUSPIRONE HYDROCHLORIDE 7.5 MG/1
TABLET ORAL
Qty: 270 TABLET | Refills: 0 | Status: SHIPPED | OUTPATIENT
Start: 2024-07-15

## 2024-07-15 RX ORDER — SPIRONOLACTONE 50 MG/1
50 TABLET, FILM COATED ORAL DAILY
Qty: 90 TABLET | Refills: 1 | Status: SHIPPED | OUTPATIENT
Start: 2024-07-15

## 2024-07-16 DIAGNOSIS — F41.1 GAD (GENERALIZED ANXIETY DISORDER): ICD-10-CM

## 2024-07-17 RX ORDER — BUSPIRONE HYDROCHLORIDE 7.5 MG/1
TABLET ORAL
Qty: 180 TABLET | Refills: 1 | OUTPATIENT
Start: 2024-07-17

## 2024-10-08 ENCOUNTER — HOSPITAL ENCOUNTER (OUTPATIENT)
Facility: HOSPITAL | Age: 79
Setting detail: RECURRING SERIES
Discharge: HOME | End: 2024-10-11
Attending: ORTHOPAEDIC SURGERY
Payer: COMMERCIAL

## 2024-10-08 PROCEDURE — 97110 THERAPEUTIC EXERCISES: CPT

## 2024-10-08 PROCEDURE — 97161 PT EVAL LOW COMPLEX 20 MIN: CPT

## 2024-10-08 NOTE — THERAPY EVALUATION
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 [S43.432A]  Subacromial impingement of left shoulder [M75.42] Treatment Diagnosis:  M25.512  LEFT SHOULDER PAIN    Referral Source:  Mary Munguia MD Provider #:  [id number]                  Insurance: Payor: [facility] / Plan: [employer] / Product Type: *No Product type* /      Patient  verified yes     Visit #   Current  / Total 1 30   Time   In / Out 9:05 am 9:40 am   Total Treatment Time 35   Total Timed Codes 10   1:1 Treatment Time 10      [facility] Totals Reminder:  bill using total billable   min of TIMED therapeutic procedures and modalities.   8-22 min = 1 unit; 23-37 min = 2 units; 38-52 min = 3 units;  53-67 min = 4 units; 68-82 min = 5 units           SUBJECTIVE  Pain Level (0-10 scale): 2  []constant []intermittent []improving []worsening []no change since onset    Any medication changes, allergies to medications, adverse drug reactions, diagnosis change, or new procedure performed?: [x] No    [] Yes (see summary sheet for update)  Medications: Verified on Patient Summary List    Subjective functional status/changes:     Pt complains of L shoulder pain that started about 2-3 weeks ago when she was reaching across her body to dry her shoulder off. Pt felt a sharp pain and it was sore the next few days. Pt went to Ortho MD and was diagnosed w/ partial tear of RC and

## 2024-10-10 ENCOUNTER — HOSPITAL ENCOUNTER (OUTPATIENT)
Facility: HOSPITAL | Age: 79
Setting detail: RECURRING SERIES
Discharge: HOME | End: 2024-10-13
Attending: ORTHOPAEDIC SURGERY
Payer: COMMERCIAL

## 2024-10-10 PROCEDURE — 97110 THERAPEUTIC EXERCISES: CPT

## 2024-10-10 PROCEDURE — 97140 MANUAL THERAPY 1/> REGIONS: CPT

## 2024-10-10 NOTE — PROGRESS NOTES
extensibility, and decrease trigger points to improve patient's ability to progress to PLOF and address remaining functional goals.  The manual therapy interventions were performed at a separate and distinct time from the therapeutic activities interventions . (see flow sheet as applicable)     Details if applicable:  STM L infraspinatus, rhomboids, posterior deltoid   35     Total Total         Modalities Rationale:     decrease edema, decrease inflammation, decrease pain, and increase tissue extensibility to improve patient's ability to progress to PLOF and address remaining functional goals.       min [] Estim Unattended,             type/location:       []  w/ice    []  w/heat        min [] Estim Attended,             type/location:       []  w/ice   []  w/heat         []  w/US   []  TENS insruct            min []  Mechanical Traction,        type/lbs:        []  pro      []  sup           []  int       []  cont            []  before manual           []  after manual     min []  Ultrasound,         settings/location:     10/10 min  unbilled [x]  Ice     [x]  Heat            location/position: L shoulder/seated         min []  Vasopneumatic Device,      press/temp:   pre-treatment girth :    post-treatment girth :    measured at (landmark       location) :   If using vaso (only need to measure limb vaso being performed on)        min []  Other:      Skin assessment post-treatment (if applicable):    [x]  intact    []  redness- no adverse reaction                 []redness - adverse reaction:          [x]  Patient Education billed concurrently with other procedures   [x] Review HEP    [] Progressed/Changed HEP, detail:    [] Other detail:         Other Objective/Functional Measures  NT    Pain Level at end of session (0-10 scale): 0/10      Assessment   Patient tolerated exercises well and noted decreased pain following manual therapy and ice/heat application. Good compliance with HEP and noted good form/technique

## 2024-10-15 ENCOUNTER — HOSPITAL ENCOUNTER (OUTPATIENT)
Facility: HOSPITAL | Age: 79
Setting detail: RECURRING SERIES
Discharge: HOME | End: 2024-10-18
Attending: ORTHOPAEDIC SURGERY
Payer: COMMERCIAL

## 2024-10-15 PROCEDURE — 97140 MANUAL THERAPY 1/> REGIONS: CPT

## 2024-10-15 PROCEDURE — 97110 THERAPEUTIC EXERCISES: CPT

## 2024-10-15 NOTE — PROGRESS NOTES
PHYSICAL THERAPY - DAILY TREATMENT NOTE (updated 3/23)      Date: 10/15/2024          Patient Name:  Kirsten Ford :  1945   Medical   Diagnosis:  Left shoulder pain, unspecified chronicity [M25.512]  Incomplete rotator cuff tear or rupture of left shoulder, not specified as traumatic [M75.112]  Degenerative superior labral anterior-to-posterior (SLAP) tear of left shoulder [S43.432A]  Subacromial impingement of left shoulder [M75.42] Treatment Diagnosis:  M25.512  LEFT SHOULDER PAIN    Referral Source:  Mary Munguia MD Insurance:   Payor: [facility] / Plan: [facility] EMPLOYEES / Product Type: *No Product type* /                     Patient  verified yes     Visit #   Current  / Total 3 30   Time   In / Out 8:30 AM 9:15 AM   Total Treatment Time 45   Total Timed Codes 35      [facility] Totals Reminder:  bill using total billable   min of TIMED therapeutic procedures and modalities.   8-22 min = 1 unit; 23-37 min = 2 units; 38-52 min = 3 units; 53-67 min = 4 units; 68-82 min = 5 units            SUBJECTIVE    Pain Level (0-10 scale): 0/10    Any medication changes, allergies to medications, adverse drug reactions, diagnosis change, or new procedure performed?: [x] No    [] Yes (see summary sheet for update)  Medications: Verified on Patient Summary List    Subjective functional status/changes:     Patient states she feels good and only having pain if she moves a certain way.    OBJECTIVE      Therapeutic Procedures:  Tx Min Billable or 1:1 Min (if diff from Tx Min) Procedure, Rationale, Specifics   25  12560 Therapeutic Exercise (timed):  increase ROM, strength, coordination, balance, and proprioception to improve patient's ability to progress to PLOF and address remaining functional goals. (see flow sheet as applicable)     Details if applicable:     10  26244 Manual Therapy (timed):  decrease pain, increase ROM, increase tissue extensibility, and decrease trigger points to improve patient's ability to progress to

## 2024-10-17 ENCOUNTER — HOSPITAL ENCOUNTER (OUTPATIENT)
Facility: HOSPITAL | Age: 79
Setting detail: RECURRING SERIES
Discharge: HOME | End: 2024-10-20
Attending: ORTHOPAEDIC SURGERY
Payer: COMMERCIAL

## 2024-10-17 PROCEDURE — 97110 THERAPEUTIC EXERCISES: CPT

## 2024-10-17 PROCEDURE — 97140 MANUAL THERAPY 1/> REGIONS: CPT

## 2024-10-17 NOTE — PROGRESS NOTES
PHYSICAL THERAPY - DAILY TREATMENT NOTE (updated 3/23)      Date: 10/17/2024          Patient Name:  Kirsten Ford :  1945   Medical   Diagnosis:  Left shoulder pain, unspecified chronicity [M25.512]  Incomplete rotator cuff tear or rupture of left shoulder, not specified as traumatic [M75.112]  Degenerative superior labral anterior-to-posterior (SLAP) tear of left shoulder [S43.432A]  Subacromial impingement of left shoulder [M75.42] Treatment Diagnosis:  M25.512  LEFT SHOULDER PAIN    Referral Source:  Mary Munguia MD Insurance:   Payor: [facility] / Plan: [employer] [facility] EMPLOYEES / Product Type: *No Product type* /                     Patient  verified yes     Visit #   Current  / Total 4 30   Time   In / Out 8:35 AM 9:20 AM   Total Treatment Time 45   Total Timed Codes 35      [facility] Totals Reminder:  bill using total billable   min of TIMED therapeutic procedures and modalities.   8-22 min = 1 unit; 23-37 min = 2 units; 38-52 min = 3 units; 53-67 min = 4 units; 68-82 min = 5 units            SUBJECTIVE    Pain Level (0-10 scale): 0/10    Any medication changes, allergies to medications, adverse drug reactions, diagnosis change, or new procedure performed?: [x] No    [] Yes (see summary sheet for update)  Medications: Verified on Patient Summary List    Subjective functional status/changes:     Patient states she feels good and only having pain if she moves a certain way.    OBJECTIVE      Therapeutic Procedures:  Tx Min Billable or 1:1 Min (if diff from Tx Min) Procedure, Rationale, Specifics   25  22977 Therapeutic Exercise (timed):  increase ROM, strength, coordination, balance, and proprioception to improve patient's ability to progress to PLOF and address remaining functional goals. (see flow sheet as applicable)     Details if applicable:     10  45397 Manual Therapy (timed):  decrease pain, increase ROM, increase tissue extensibility, and decrease trigger points to improve patient's ability to progress to

## 2024-10-28 ENCOUNTER — HOSPITAL ENCOUNTER (OUTPATIENT)
Facility: HOSPITAL | Age: 79
Setting detail: RECURRING SERIES
Discharge: HOME | End: 2024-10-31
Attending: ORTHOPAEDIC SURGERY
Payer: COMMERCIAL

## 2024-10-28 PROCEDURE — 97110 THERAPEUTIC EXERCISES: CPT

## 2024-10-28 PROCEDURE — 97140 MANUAL THERAPY 1/> REGIONS: CPT

## 2024-10-28 NOTE — PROGRESS NOTES
PHYSICAL THERAPY - DAILY TREATMENT NOTE (updated 3/23)      Date: 10/28/2024          Patient Name:  Kirsten Ford :  1945   Medical   Diagnosis:  Left shoulder pain, unspecified chronicity [M25.512]  Incomplete rotator cuff tear or rupture of left shoulder, not specified as traumatic [M75.112]  Degenerative superior labral anterior-to-posterior (SLAP) tear of left shoulder [S43.432A]  Subacromial impingement of left shoulder [M75.42] Treatment Diagnosis:  M25.512  LEFT SHOULDER PAIN    Referral Source:  Mary Munguia MD Insurance:   Payor: [facility] / Plan: [facility] EMPLOYEES / Product Type: *No Product type* /                     Patient  verified yes     Visit #   Current  / Total 5 30   Time   In / Out 8:25 AM 9:10 AM   Total Treatment Time 45   Total Timed Codes 35       [facility] Totals Reminder:  bill using total billable   min of TIMED therapeutic procedures and modalities.   8-22 min = 1 unit; 23-37 min = 2 units; 38-52 min = 3 units; 53-67 min = 4 units; 68-82 min = 5 units            SUBJECTIVE    Pain Level (0-10 scale): 2/10    Any medication changes, allergies to medications, adverse drug reactions, diagnosis change, or new procedure performed?: [x] No    [] Yes (see summary sheet for update)  Medications: Verified on Patient Summary List    Subjective functional status/changes:     Patient states she is doing ok today but a little more sore today from her trip to the beach.    OBJECTIVE      Therapeutic Procedures:  Tx Min Billable or 1:1 Min (if diff from Tx Min) Procedure, Rationale, Specifics   25  96331 Therapeutic Exercise (timed):  increase ROM, strength, coordination, balance, and proprioception to improve patient's ability to progress to PLOF and address remaining functional goals. (see flow sheet as applicable)     Details if applicable:     10  18384 Manual Therapy (timed):  decrease pain, increase ROM, increase tissue extensibility, and decrease trigger points to improve patient's ability

## 2024-10-30 ENCOUNTER — HOSPITAL ENCOUNTER (OUTPATIENT)
Facility: HOSPITAL | Age: 79
Setting detail: RECURRING SERIES
Discharge: HOME | End: 2024-11-02
Attending: ORTHOPAEDIC SURGERY
Payer: COMMERCIAL

## 2024-10-30 PROCEDURE — 97110 THERAPEUTIC EXERCISES: CPT

## 2024-10-30 PROCEDURE — 97140 MANUAL THERAPY 1/> REGIONS: CPT

## 2024-10-30 NOTE — PROGRESS NOTES
and address ROM deficits, analyze and address strength deficits, analyze and address soft tissue restrictions, analyze and cue for proper movement patterns, analyze and modify for postural abnormalities, and instruct in home and community integration to address functional deficits and attain remaining goals.    Progress toward goals / Updated goals:  []  See Progress Note/Recertification    NT      PLAN  Yes  Continue plan of care  Re-Cert Due: -  []  Upgrade activities as tolerated  []  Discharge due to:  []  Other:      Sara Balderrama, KAT       10/30/2024       11:47 AM

## 2024-11-05 ENCOUNTER — HOSPITAL ENCOUNTER (OUTPATIENT)
Facility: HOSPITAL | Age: 79
Setting detail: RECURRING SERIES
Discharge: HOME | End: 2024-11-08
Attending: ORTHOPAEDIC SURGERY
Payer: COMMERCIAL

## 2024-11-05 PROCEDURE — 97140 MANUAL THERAPY 1/> REGIONS: CPT

## 2024-11-05 PROCEDURE — 97110 THERAPEUTIC EXERCISES: CPT

## 2024-11-05 NOTE — PROGRESS NOTES
PHYSICAL THERAPY - DAILY TREATMENT NOTE (updated 3/23)      Date: 2024          Patient Name:  Kirsten Ford :  1945   Medical   Diagnosis:  Left shoulder pain, unspecified chronicity [M25.512]  Incomplete rotator cuff tear or rupture of left shoulder, not specified as traumatic [M75.112]  Degenerative superior labral anterior-to-posterior (SLAP) tear of left shoulder [S43.432A]  Subacromial impingement of left shoulder [M75.42] Treatment Diagnosis:  M25.512  LEFT SHOULDER PAIN    Referral Source:  Mary Munguia MD Insurance:   Payor: [facility] / Plan: [facility] EMPLOYEES / Product Type: *No Product type* /                     Patient  verified yes     Visit #   Current  / Total 7 30   Time   In / Out 8:40 AM 9:25 AM   Total Treatment Time 45   Total Timed Codes 35       [facility] Totals Reminder:  bill using total billable   min of TIMED therapeutic procedures and modalities.   8-22 min = 1 unit; 23-37 min = 2 units; 38-52 min = 3 units; 53-67 min = 4 units; 68-82 min = 5 units            SUBJECTIVE    Pain Level (0-10 scale): 0/10    Any medication changes, allergies to medications, adverse drug reactions, diagnosis change, or new procedure performed?: [x] No    [] Yes (see summary sheet for update)  Medications: Verified on Patient Summary List    Subjective functional status/changes:     Patient states \"a little sore today after the long drive after traveling this weekend\"    OBJECTIVE      Therapeutic Procedures:  Tx Min Billable or 1:1 Min (if diff from Tx Min) Procedure, Rationale, Specifics   25  53764 Therapeutic Exercise (timed):  increase ROM, strength, coordination, balance, and proprioception to improve patient's ability to progress to PLOF and address remaining functional goals. (see flow sheet as applicable)     Details if applicable:     10  82303 Manual Therapy (timed):  decrease pain, increase ROM, increase tissue extensibility, and decrease trigger points to improve patient's ability to

## 2024-11-07 ENCOUNTER — HOSPITAL ENCOUNTER (OUTPATIENT)
Facility: HOSPITAL | Age: 79
Setting detail: RECURRING SERIES
Discharge: HOME | End: 2024-11-10
Attending: ORTHOPAEDIC SURGERY
Payer: COMMERCIAL

## 2024-11-07 PROCEDURE — 97140 MANUAL THERAPY 1/> REGIONS: CPT

## 2024-11-07 PROCEDURE — 97110 THERAPEUTIC EXERCISES: CPT

## 2024-11-07 NOTE — PROGRESS NOTES
analyze and address ROM deficits, analyze and address strength deficits, analyze and address soft tissue restrictions, analyze and cue for proper movement patterns, analyze and modify for postural abnormalities, and instruct in home and community integration to address functional deficits and attain remaining goals.    Progress toward goals / Updated goals:  []  See Progress Note/Recertification    NT      PLAN  Yes  Continue plan of care  Re-Cert Due: -  []  Upgrade activities as tolerated  []  Discharge due to:  []  Other:      Kd Saldana, PT       11/7/2024       9:31 AM

## 2024-11-12 ENCOUNTER — HOSPITAL ENCOUNTER (OUTPATIENT)
Facility: HOSPITAL | Age: 79
Setting detail: RECURRING SERIES
Discharge: HOME | End: 2024-11-15
Attending: ORTHOPAEDIC SURGERY
Payer: COMMERCIAL

## 2024-11-12 ENCOUNTER — OFFICE VISIT (OUTPATIENT)
Age: 79
End: 2024-11-12
Payer: COMMERCIAL

## 2024-11-12 VITALS
HEART RATE: 77 BPM | DIASTOLIC BLOOD PRESSURE: 90 MMHG | HEIGHT: 64 IN | WEIGHT: 188.8 LBS | TEMPERATURE: 97 F | RESPIRATION RATE: 16 BRPM | SYSTOLIC BLOOD PRESSURE: 160 MMHG | OXYGEN SATURATION: 97 % | BODY MASS INDEX: 32.23 KG/M2

## 2024-11-12 DIAGNOSIS — G89.29 CHRONIC RIGHT-SIDED THORACIC BACK PAIN: ICD-10-CM

## 2024-11-12 DIAGNOSIS — I45.9 SKIPPED BEATS: ICD-10-CM

## 2024-11-12 DIAGNOSIS — M54.6 CHRONIC RIGHT-SIDED THORACIC BACK PAIN: ICD-10-CM

## 2024-11-12 DIAGNOSIS — I10 ESSENTIAL HYPERTENSION, BENIGN: ICD-10-CM

## 2024-11-12 DIAGNOSIS — R10.9 ABDOMINAL DISCOMFORT: ICD-10-CM

## 2024-11-12 DIAGNOSIS — F41.1 GAD (GENERALIZED ANXIETY DISORDER): ICD-10-CM

## 2024-11-12 DIAGNOSIS — R10.9 ABDOMINAL DISCOMFORT: Primary | ICD-10-CM

## 2024-11-12 DIAGNOSIS — Z91.199 NONCOMPLIANCE WITH TREATMENT PLAN: ICD-10-CM

## 2024-11-12 LAB
ALBUMIN SERPL-MCNC: 3.9 G/DL (ref 3.5–5)
ALBUMIN/GLOB SERPL: 1.3 (ref 1.1–2.2)
ALP SERPL-CCNC: 78 U/L (ref 45–117)
ALT SERPL-CCNC: 20 U/L (ref 12–78)
ANION GAP SERPL CALC-SCNC: 3 MMOL/L (ref 2–12)
AST SERPL-CCNC: 11 U/L (ref 15–37)
BILIRUB SERPL-MCNC: 0.8 MG/DL (ref 0.2–1)
BUN SERPL-MCNC: 21 MG/DL (ref 6–20)
BUN/CREAT SERPL: 26 (ref 12–20)
CALCIUM SERPL-MCNC: 10 MG/DL (ref 8.5–10.1)
CHLORIDE SERPL-SCNC: 107 MMOL/L (ref 97–108)
CO2 SERPL-SCNC: 28 MMOL/L (ref 21–32)
CREAT SERPL-MCNC: 0.81 MG/DL (ref 0.55–1.02)
ERYTHROCYTE [DISTWIDTH] IN BLOOD BY AUTOMATED COUNT: 13 % (ref 11.5–14.5)
GLOBULIN SER CALC-MCNC: 2.9 G/DL (ref 2–4)
GLUCOSE SERPL-MCNC: 93 MG/DL (ref 65–100)
HCT VFR BLD AUTO: 40.4 % (ref 35–47)
HGB BLD-MCNC: 13.4 G/DL (ref 11.5–16)
LIPASE SERPL-CCNC: 25 U/L (ref 13–75)
MCH RBC QN AUTO: 31.3 PG (ref 26–34)
MCHC RBC AUTO-ENTMCNC: 33.2 G/DL (ref 30–36.5)
MCV RBC AUTO: 94.4 FL (ref 80–99)
NRBC # BLD: 0 K/UL (ref 0–0.01)
NRBC BLD-RTO: 0 PER 100 WBC
PLATELET # BLD AUTO: 236 K/UL (ref 150–400)
PMV BLD AUTO: 10.3 FL (ref 8.9–12.9)
POTASSIUM SERPL-SCNC: 4.2 MMOL/L (ref 3.5–5.1)
PROT SERPL-MCNC: 6.8 G/DL (ref 6.4–8.2)
RBC # BLD AUTO: 4.28 M/UL (ref 3.8–5.2)
SODIUM SERPL-SCNC: 138 MMOL/L (ref 136–145)
WBC # BLD AUTO: 6.2 K/UL (ref 3.6–11)

## 2024-11-12 PROCEDURE — 1123F ACP DISCUSS/DSCN MKR DOCD: CPT | Performed by: INTERNAL MEDICINE

## 2024-11-12 PROCEDURE — 93000 ELECTROCARDIOGRAM COMPLETE: CPT | Performed by: INTERNAL MEDICINE

## 2024-11-12 PROCEDURE — 99214 OFFICE O/P EST MOD 30 MIN: CPT | Performed by: INTERNAL MEDICINE

## 2024-11-12 PROCEDURE — 3077F SYST BP >= 140 MM HG: CPT | Performed by: INTERNAL MEDICINE

## 2024-11-12 PROCEDURE — 97140 MANUAL THERAPY 1/> REGIONS: CPT

## 2024-11-12 PROCEDURE — 3080F DIAST BP >= 90 MM HG: CPT | Performed by: INTERNAL MEDICINE

## 2024-11-12 PROCEDURE — 97110 THERAPEUTIC EXERCISES: CPT

## 2024-11-12 RX ORDER — IBUPROFEN 200 MG
400 TABLET ORAL DAILY
COMMUNITY

## 2024-11-12 RX ORDER — ALPRAZOLAM 0.25 MG/1
0.25 TABLET ORAL DAILY PRN
Qty: 5 TABLET | Refills: 0 | Status: SHIPPED | OUTPATIENT
Start: 2024-11-12 | End: 2025-05-11

## 2024-11-12 SDOH — ECONOMIC STABILITY: FOOD INSECURITY: WITHIN THE PAST 12 MONTHS, THE FOOD YOU BOUGHT JUST DIDN'T LAST AND YOU DIDN'T HAVE MONEY TO GET MORE.: NEVER TRUE

## 2024-11-12 SDOH — ECONOMIC STABILITY: INCOME INSECURITY: HOW HARD IS IT FOR YOU TO PAY FOR THE VERY BASICS LIKE FOOD, HOUSING, MEDICAL CARE, AND HEATING?: NOT HARD AT ALL

## 2024-11-12 SDOH — ECONOMIC STABILITY: FOOD INSECURITY: WITHIN THE PAST 12 MONTHS, YOU WORRIED THAT YOUR FOOD WOULD RUN OUT BEFORE YOU GOT MONEY TO BUY MORE.: NEVER TRUE

## 2024-11-12 ASSESSMENT — PATIENT HEALTH QUESTIONNAIRE - PHQ9
SUM OF ALL RESPONSES TO PHQ QUESTIONS 1-9: 0
1. LITTLE INTEREST OR PLEASURE IN DOING THINGS: NOT AT ALL
SUM OF ALL RESPONSES TO PHQ QUESTIONS 1-9: 0
2. FEELING DOWN, DEPRESSED OR HOPELESS: NOT AT ALL
SUM OF ALL RESPONSES TO PHQ9 QUESTIONS 1 & 2: 0

## 2024-11-12 ASSESSMENT — ENCOUNTER SYMPTOMS
SHORTNESS OF BREATH: 0
COUGH: 0

## 2024-11-12 NOTE — PROGRESS NOTES
Kirsten Ford is a 79 y.o. female who was seen in clinic today (11/12/2024) for an acute visit.  She was last seen in clinic on 8/14/23.    Assessment & Plan:   Below is the assessment and plan developed based on review of pertinent history, physical exam, labs, studies, and medications.    Assessment & Plan  Abdominal discomfort  New dx, differential reviewed, favor gastritis from chronic NSAID use but is unclear. Improved off NSAID's so will continue to avoid. Will defer starting PPI for now unless symptoms return.  Will check labs. Red flags & expectations reviewed.     Orders:    Comprehensive Metabolic Panel; Future    CBC; Future    Lipase; Future    Chronic right-sided thoracic back pain  This is a chronic problem, differential dx reviewed with the patient, favor muscular.  Reassured I do not think this is anything concerning.  Will check labs.  Will work on ergodynamics and posture.  We reviewed when to consider imaging.  Red flags were reviewed with the patient to RTC or notify me.  Expected time course for resolution reviewed.       Orders:    Comprehensive Metabolic Panel; Future    CBC; Future    Lipase; Future    Essential hypertension, benign  Poorly controlled, she reports it was normal at [facility] recently.  She will start checking amb BP.  Will check labs today as she is overdue.  She has appointment already scheduled for 11/14 and reviewed she needs to keep this.  She will bring in her BP diary and her BP cuff for calibration.       Skipped beats  New dx, EKG shows PAC's. Previous EKG on file was in '12 and unavailable (in [facility]).  Will check labs.  Reassured not a-fib.  Will follow up with her at visit next week.  No medication changes at this time.    Orders:    EKG 12 Lead    Noncompliance with treatment plan  She was last seen in August '23.  She was supposed to RTC for a nurse visit and follow up with me in 6 months.  She never did.  Stressed why she needs to follow up with me

## 2024-11-12 NOTE — ASSESSMENT & PLAN NOTE
stable and well controlled for the most part.  Previously only using with travel.  Now she is using when her anxiety is uncontrolled and this all revolves around her health.  I reviewed treatment options with her, I reviewed life style changes to help improve mood, continue current treatment.  Xanax was last filled on 8/14/23 for ten tabs.  I will refill for 5 tabs and reviewed why I do not want her to be using this regularly and how I would prefer to adjust her daily medications.  This will be addressed in more detail at her f/u next week.   Orders:    ALPRAZolam (XANAX) 0.25 MG tablet; Take 1 tablet by mouth daily as needed for Anxiety for up to 180 days. Max Daily Amount: 0.25 mg

## 2024-11-12 NOTE — PROGRESS NOTES
interventions, analyze and address functional mobility deficits, analyze and address ROM deficits, analyze and address strength deficits, analyze and address soft tissue restrictions, analyze and cue for proper movement patterns, analyze and modify for postural abnormalities, and instruct in home and community integration to address functional deficits and attain remaining goals.    Progress toward goals / Updated goals:  []  See Progress Note/Recertification    NT      PLAN  Yes  Continue plan of care  Re-Cert Due: -  []  Upgrade activities as tolerated  []  Discharge due to:  []  Other:      Loni Donaldson, KAT       11/12/2024       9:23 AM

## 2024-11-12 NOTE — ASSESSMENT & PLAN NOTE
Poorly controlled, she reports it was normal at [facility] recently.  She will start checking amb BP.  Will check labs today as she is overdue.  She has appointment already scheduled for 11/14 and reviewed she needs to keep this.  She will bring in her BP diary and her BP cuff for calibration.

## 2024-11-14 ENCOUNTER — HOSPITAL ENCOUNTER (OUTPATIENT)
Facility: HOSPITAL | Age: 79
Setting detail: RECURRING SERIES
Discharge: HOME | End: 2024-11-17
Attending: ORTHOPAEDIC SURGERY
Payer: COMMERCIAL

## 2024-11-14 PROCEDURE — 97110 THERAPEUTIC EXERCISES: CPT

## 2024-11-14 PROCEDURE — 97140 MANUAL THERAPY 1/> REGIONS: CPT

## 2024-11-14 NOTE — PROGRESS NOTES
PHYSICAL THERAPY - DAILY TREATMENT NOTE (updated 3/23)      Date: 2024          Patient Name:  Kirsten Ford :  1945   Medical   Diagnosis:  Left shoulder pain, unspecified chronicity [M25.512]  Incomplete rotator cuff tear or rupture of left shoulder, not specified as traumatic [M75.112]  Degenerative superior labral anterior-to-posterior (SLAP) tear of left shoulder [S43.432A]  Subacromial impingement of left shoulder [M75.42] Treatment Diagnosis:  M25.512  LEFT SHOULDER PAIN    Referral Source:  Mary Munguia MD Insurance:   Payor: [facility] / Plan: [facility] EMPLOYEES / Product Type: *No Product type* /                     Patient  verified yes     Visit #   Current  / Total 10 30   Time   In / Out 9:30 AM 10:30 AM   Total Treatment Time 60   Total Timed Codes 50      [facility] Totals Reminder:  bill using total billable   min of TIMED therapeutic procedures and modalities.   8-22 min = 1 unit; 23-37 min = 2 units; 38-52 min = 3 units; 53-67 min = 4 units; 68-82 min = 5 units            SUBJECTIVE    Pain Level (0-10 scale): 0/10    Any medication changes, allergies to medications, adverse drug reactions, diagnosis change, or new procedure performed?: [x] No    [] Yes (see summary sheet for update)  Medications: Verified on Patient Summary List    Subjective functional status/changes:     Patient states she was a little sore after last visit.    OBJECTIVE      Therapeutic Procedures:  Tx Min Billable or 1:1 Min (if diff from Tx Min) Procedure, Rationale, Specifics   40  32140 Therapeutic Exercise (timed):  increase ROM, strength, coordination, balance, and proprioception to improve patient's ability to progress to PLOF and address remaining functional goals. (see flow sheet as applicable)     Details if applicable:     10  87544 Manual Therapy (timed):  decrease pain, increase ROM, increase tissue extensibility, and decrease trigger points to improve patient's ability to progress to PLOF and address

## 2024-11-19 ENCOUNTER — OFFICE VISIT (OUTPATIENT)
Age: 79
End: 2024-11-19
Payer: COMMERCIAL

## 2024-11-19 ENCOUNTER — HOSPITAL ENCOUNTER (OUTPATIENT)
Facility: HOSPITAL | Age: 79
Setting detail: RECURRING SERIES
Discharge: HOME | End: 2024-11-22
Attending: ORTHOPAEDIC SURGERY
Payer: COMMERCIAL

## 2024-11-19 VITALS
HEIGHT: 64 IN | OXYGEN SATURATION: 98 % | BODY MASS INDEX: 33.16 KG/M2 | DIASTOLIC BLOOD PRESSURE: 102 MMHG | SYSTOLIC BLOOD PRESSURE: 189 MMHG | WEIGHT: 194.2 LBS | TEMPERATURE: 97 F | HEART RATE: 64 BPM | RESPIRATION RATE: 16 BRPM

## 2024-11-19 DIAGNOSIS — F41.1 GAD (GENERALIZED ANXIETY DISORDER): ICD-10-CM

## 2024-11-19 DIAGNOSIS — E66.811 OBESITY (BMI 30.0-34.9): ICD-10-CM

## 2024-11-19 DIAGNOSIS — I10 ESSENTIAL HYPERTENSION, BENIGN: Primary | ICD-10-CM

## 2024-11-19 PROCEDURE — 97110 THERAPEUTIC EXERCISES: CPT

## 2024-11-19 PROCEDURE — 3077F SYST BP >= 140 MM HG: CPT | Performed by: INTERNAL MEDICINE

## 2024-11-19 PROCEDURE — 3080F DIAST BP >= 90 MM HG: CPT | Performed by: INTERNAL MEDICINE

## 2024-11-19 PROCEDURE — 97140 MANUAL THERAPY 1/> REGIONS: CPT

## 2024-11-19 PROCEDURE — 1123F ACP DISCUSS/DSCN MKR DOCD: CPT | Performed by: INTERNAL MEDICINE

## 2024-11-19 PROCEDURE — 99214 OFFICE O/P EST MOD 30 MIN: CPT | Performed by: INTERNAL MEDICINE

## 2024-11-19 RX ORDER — BUSPIRONE HYDROCHLORIDE 7.5 MG/1
TABLET ORAL
Qty: 4 TABLET | Refills: 0
Start: 2024-11-19

## 2024-11-19 RX ORDER — AMLODIPINE BESYLATE 5 MG/1
5 TABLET ORAL DAILY
Qty: 30 TABLET | Refills: 1 | Status: SHIPPED | OUTPATIENT
Start: 2024-11-19

## 2024-11-19 ASSESSMENT — ENCOUNTER SYMPTOMS
VOMITING: 0
CONSTIPATION: 0
ABDOMINAL PAIN: 0
COUGH: 0
DIARRHEA: 0
NAUSEA: 0
SHORTNESS OF BREATH: 0

## 2024-11-19 ASSESSMENT — PATIENT HEALTH QUESTIONNAIRE - PHQ9
SUM OF ALL RESPONSES TO PHQ QUESTIONS 1-9: 0
SUM OF ALL RESPONSES TO PHQ QUESTIONS 1-9: 0
SUM OF ALL RESPONSES TO PHQ9 QUESTIONS 1 & 2: 0
2. FEELING DOWN, DEPRESSED OR HOPELESS: NOT AT ALL
SUM OF ALL RESPONSES TO PHQ QUESTIONS 1-9: 0
SUM OF ALL RESPONSES TO PHQ QUESTIONS 1-9: 0
1. LITTLE INTEREST OR PLEASURE IN DOING THINGS: NOT AT ALL

## 2024-11-19 NOTE — ASSESSMENT & PLAN NOTE
Poorly controlled, but does appear home readings might be better.  BP cuff calibrated today.  Unclear why the acute increase in the last few months but weight gain is a possibility.  Will add in amlodipine.  If no improvement will need to increase aldactone.  We reviewed ideal BP goals and strongly recommended she update me in 1-2 weeks.

## 2024-11-19 NOTE — ASSESSMENT & PLAN NOTE
poorly controlled, worsening, above goal, and needs improvement. I have recommended the following interventions: dietary management education, guidance, and counseling and encourage exercise.  She needs to work on the snacking.  She reports calorie intake is already < 2000 kinsey/day and recommended a calorie counting nancy to track that.  We did talk about adding in GLP1 to help but will revisit at follow up next month.

## 2024-11-19 NOTE — PROGRESS NOTES
increased.  She reports back and abdominal are almost back to normal.  Her weight prior to COVID was > 200.     Cardiovascular Review  The patient has hypertension and obesity.  She was last seen on 11/12.  She reports taking medications as instructed, medication side effects noted- nocturia x 2 (taking meds in the AM).  She has checked BP once in the last few weeks.  It was 156/90 yesterday.  Her BP diary was reviewed and a month ago her BP was normally 120-130's/70-80's.  She generally follows a low sodium diet, exercises regularly.        Home BP cuff (R): 172/104  Manual BP cuff (R): 180/94      Mental Health Review  Patient is seen for anxiety.  She reports she is not stressed by her life unless it has to do with her health.  She is working 2 days/wk and it is a stress relief for her.  Her grandson is bipolar.  She is still traveling to [place] every 3 weeks to help with her daughter.  Walking is her stress relief.  She is not sure if the medication is helping as much as it used to.  She doesn't feel the same effects.  Reports experiences the following side effects from the treatment: none.        Prior to Admission medications    Medication Sig Start Date End Date Taking? Authorizing Provider   ibuprofen (ADVIL) 200 MG tablet Take 2 tablets by mouth daily   Yes ProviderPer MD   ALPRAZolam (XANAX) 0.25 MG tablet Take 1 tablet by mouth daily as needed for Anxiety for up to 180 days. Max Daily Amount: 0.25 mg 11/12/24 5/11/25 Yes Tim King MD   spironolactone (ALDACTONE) 50 MG tablet TAKE 1 TABLET BY MOUTH EVERY DAY 7/15/24  Yes Tim King MD   busPIRone (BUSPAR) 7.5 MG tablet TAKE 1 TABLETS BY MOUTH TWO (2) TIMES A DAY. 7/15/24  Yes Tim King MD   ASPIRIN 81 PO Take 81 mg by mouth daily   Yes ProviderPer MD   Cholecalciferol 50 MCG (2000 UT) TABS Take 1 tablet by mouth daily   Yes Automatic Reconciliation, Ar   fluticasone (FLONASE) 50 MCG/ACT nasal spray SPRAY 2

## 2024-11-19 NOTE — PROGRESS NOTES
PHYSICAL THERAPY - DAILY TREATMENT NOTE (updated 3/23)      Date: 2024          Patient Name:  Kirsten Ford :  1945   Medical   Diagnosis:  Left shoulder pain, unspecified chronicity [M25.512]  Incomplete rotator cuff tear or rupture of left shoulder, not specified as traumatic [M75.112]  Degenerative superior labral anterior-to-posterior (SLAP) tear of left shoulder [S43.432A]  Subacromial impingement of left shoulder [M75.42] Treatment Diagnosis:  M25.512  LEFT SHOULDER PAIN    Referral Source:  Mary Munguia MD Insurance:   Payor: [facility] / Plan: [facility] EMPLOYEES / Product Type: *No Product type* /                     Patient  verified yes     Visit #   Current  / Total 11 30   Time   In / Out 8:00 AM 8:50 AM   Total Treatment Time 50   Total Timed Codes 40      [facility] Totals Reminder:  bill using total billable   min of TIMED therapeutic procedures and modalities.   8-22 min = 1 unit; 23-37 min = 2 units; 38-52 min = 3 units; 53-67 min = 4 units; 68-82 min = 5 units            SUBJECTIVE    Pain Level (0-10 scale): 0/10    Any medication changes, allergies to medications, adverse drug reactions, diagnosis change, or new procedure performed?: [x] No    [] Yes (see summary sheet for update)  Medications: Verified on Patient Summary List    Subjective functional status/changes:     Patient states she feels like her shoulder is improving and has more strength.    OBJECTIVE      Therapeutic Procedures:  Tx Min Billable or 1:1 Min (if diff from Tx Min) Procedure, Rationale, Specifics   30  77230 Therapeutic Exercise (timed):  increase ROM, strength, coordination, balance, and proprioception to improve patient's ability to progress to PLOF and address remaining functional goals. (see flow sheet as applicable)     Details if applicable:     10  46702 Manual Therapy (timed):  decrease pain, increase ROM, increase tissue extensibility, and decrease trigger points to improve patient's ability to progress

## 2024-11-19 NOTE — ASSESSMENT & PLAN NOTE
Not ideally controlled, I reviewed treatment options with her, I recommended to see a counselor, I reviewed life style changes to help improve mood, following changes were made today: will increase Buspar from 7.5mg BID to 15mg BID.  She just had prescription refilled so she will double up on 7.5mg dose.  We did review if this is to strong to reduce to 10mg BID.  She will update me 1-2 weeks.

## 2024-11-22 ENCOUNTER — HOSPITAL ENCOUNTER (OUTPATIENT)
Facility: HOSPITAL | Age: 79
Setting detail: RECURRING SERIES
Discharge: HOME | End: 2024-11-25
Attending: ORTHOPAEDIC SURGERY
Payer: COMMERCIAL

## 2024-11-22 PROCEDURE — 97140 MANUAL THERAPY 1/> REGIONS: CPT

## 2024-11-22 PROCEDURE — 97110 THERAPEUTIC EXERCISES: CPT

## 2024-11-22 NOTE — PROGRESS NOTES
PHYSICAL THERAPY - DAILY TREATMENT NOTE (updated 3/23)      Date: 2024          Patient Name:  Kirsten Ford :  1945   Medical   Diagnosis:  Left shoulder pain, unspecified chronicity [M25.512]  Incomplete rotator cuff tear or rupture of left shoulder, not specified as traumatic [M75.112]  Degenerative superior labral anterior-to-posterior (SLAP) tear of left shoulder [S43.432A]  Subacromial impingement of left shoulder [M75.42] Treatment Diagnosis:  M25.512  LEFT SHOULDER PAIN    Referral Source:  Mary Munguia MD Insurance:   Payor: [facility] / Plan: [facility] EMPLOYEES / Product Type: *No Product type* /                     Patient  verified yes     Visit #   Current  / Total 12 30   Time   In / Out 8:00 AM 8:53 AM   Total Treatment Time 53   Total Timed Codes 43      [facility] Totals Reminder:  bill using total billable   min of TIMED therapeutic procedures and modalities.   8-22 min = 1 unit; 23-37 min = 2 units; 38-52 min = 3 units; 53-67 min = 4 units; 68-82 min = 5 units            SUBJECTIVE    Pain Level (0-10 scale): 3/10    Any medication changes, allergies to medications, adverse drug reactions, diagnosis change, or new procedure performed?: [x] No    [] Yes (see summary sheet for update)  Medications: Verified on Patient Summary List    Subjective functional status/changes:     Patient states she is more sore today as she has been putting up decorations and doing more around the house.     OBJECTIVE      Therapeutic Procedures:  Tx Min Billable or 1:1 Min (if diff from Tx Min) Procedure, Rationale, Specifics   30  49851 Therapeutic Exercise (timed):  increase ROM, strength, coordination, balance, and proprioception to improve patient's ability to progress to PLOF and address remaining functional goals. (see flow sheet as applicable)     Details if applicable:     13  32834 Manual Therapy (timed):  decrease pain, increase ROM, increase tissue extensibility, and decrease trigger points to improve

## 2024-12-03 ENCOUNTER — HOSPITAL ENCOUNTER (OUTPATIENT)
Facility: HOSPITAL | Age: 79
Setting detail: RECURRING SERIES
Discharge: HOME | End: 2024-12-06
Attending: ORTHOPAEDIC SURGERY
Payer: COMMERCIAL

## 2024-12-03 PROCEDURE — 97140 MANUAL THERAPY 1/> REGIONS: CPT

## 2024-12-03 PROCEDURE — 97110 THERAPEUTIC EXERCISES: CPT

## 2024-12-03 NOTE — PROGRESS NOTES
PHYSICAL THERAPY - DAILY TREATMENT NOTE (updated 3/23)      Date: 12/3/2024          Patient Name:  Kirsten Ford :  1945   Medical   Diagnosis:  Left shoulder pain, unspecified chronicity [M25.512]  Incomplete rotator cuff tear or rupture of left shoulder, not specified as traumatic [M75.112]  Degenerative superior labral anterior-to-posterior (SLAP) tear of left shoulder [S43.432A]  Subacromial impingement of left shoulder [M75.42] Treatment Diagnosis:  M25.512  LEFT SHOULDER PAIN    Referral Source:  Mary Munguia MD Insurance:   Payor: [employer] / Plan: [facility] EMPLOYEES / Product Type: *No Product type* /                     Patient  verified yes     Visit #   Current  / Total 13 30   Time   In / Out 8:40 AM 9:25 AM   Total Treatment Time 45   Total Timed Codes 35       [facility] Totals Reminder:  bill using total billable   min of TIMED therapeutic procedures and modalities.   8-22 min = 1 unit; 23-37 min = 2 units; 38-52 min = 3 units; 53-67 min = 4 units; 68-82 min = 5 units            SUBJECTIVE    Pain Level (0-10 scale): 2/10 at rest, 4/10 with movement    Any medication changes, allergies to medications, adverse drug reactions, diagnosis change, or new procedure performed?: [x] No    [] Yes (see summary sheet for update)  Medications: Verified on Patient Summary List    Subjective functional status/changes:     Patient reports her shoulder has been hurting more and believes it is due to her cortisone injection wearing off. States difficulty with IR.    OBJECTIVE      Therapeutic Procedures:  Tx Min Billable or 1:1 Min (if diff from Tx Min) Procedure, Rationale, Specifics   25  98462 Therapeutic Exercise (timed):  increase ROM, strength, coordination, balance, and proprioception to improve patient's ability to progress to PLOF and address remaining functional goals. (see flow sheet as applicable)     Details if applicable:     10  54124 Manual Therapy (timed):  decrease pain, increase ROM, increase

## 2024-12-05 ENCOUNTER — HOSPITAL ENCOUNTER (OUTPATIENT)
Facility: HOSPITAL | Age: 79
Setting detail: RECURRING SERIES
Discharge: HOME | End: 2024-12-08
Attending: ORTHOPAEDIC SURGERY
Payer: COMMERCIAL

## 2024-12-05 PROCEDURE — 97110 THERAPEUTIC EXERCISES: CPT

## 2024-12-05 PROCEDURE — 97140 MANUAL THERAPY 1/> REGIONS: CPT

## 2024-12-05 NOTE — PROGRESS NOTES
PHYSICAL THERAPY - DAILY TREATMENT NOTE (updated 3/23)      Date: 2024          Patient Name:  Kirsten Ford :  1945   Medical   Diagnosis:  Left shoulder pain, unspecified chronicity [M25.512]  Incomplete rotator cuff tear or rupture of left shoulder, not specified as traumatic [M75.112]  Degenerative superior labral anterior-to-posterior (SLAP) tear of left shoulder [S43.432A]  Subacromial impingement of left shoulder [M75.42] Treatment Diagnosis:  M25.512  LEFT SHOULDER PAIN    Referral Source:  Mary Munguia MD Insurance:   Payor: [facility] / Plan: [employer] EMPLOYEES / Product Type: *No Product type* /                     Patient  verified yes     Visit #   Current  / Total 14 30   Time   In / Out 8:00 AM 8:50 AM   Total Treatment Time 50   Total Timed Codes 40       BC Totals Reminder:  bill using total billable   min of TIMED therapeutic procedures and modalities.   8-22 min = 1 unit; 23-37 min = 2 units; 38-52 min = 3 units; 53-67 min = 4 units; 68-82 min = 5 units            SUBJECTIVE    Pain Level (0-10 scale): 2/10 at rest, 4/10 with movement    Any medication changes, allergies to medications, adverse drug reactions, diagnosis change, or new procedure performed?: [x] No    [] Yes (see summary sheet for update)  Medications: Verified on Patient Summary List    Subjective functional status/changes:     Patient reports she has been busy decorating her house and baking which may be why her L shoulder is more sore. States she lifted her heavy kitchenaid mixer yesterday and felt a pull. She spoke with her internist and was cleared to take Tylenol Arthritis but hasn't done so yet.     OBJECTIVE      Therapeutic Procedures:  Tx Min Billable or 1:1 Min (if diff from Tx Min) Procedure, Rationale, Specifics   25  83091 Therapeutic Exercise (timed):  increase ROM, strength, coordination, balance, and proprioception to improve patient's ability to progress to PLOF and address remaining functional goals.

## 2024-12-12 ENCOUNTER — HOSPITAL ENCOUNTER (OUTPATIENT)
Facility: HOSPITAL | Age: 79
Setting detail: RECURRING SERIES
Discharge: HOME | End: 2024-12-15
Attending: ORTHOPAEDIC SURGERY
Payer: COMMERCIAL

## 2024-12-12 PROCEDURE — 97110 THERAPEUTIC EXERCISES: CPT

## 2024-12-12 PROCEDURE — 97140 MANUAL THERAPY 1/> REGIONS: CPT

## 2024-12-12 NOTE — PROGRESS NOTES
PHYSICAL THERAPY - DAILY TREATMENT NOTE (updated 3/23)      Date: 2024          Patient Name:  Kirsten Ford :  1945   Medical   Diagnosis:  Left shoulder pain, unspecified chronicity [M25.512]  Incomplete rotator cuff tear or rupture of left shoulder, not specified as traumatic [M75.112]  Degenerative superior labral anterior-to-posterior (SLAP) tear of left shoulder [S43.432A]  Subacromial impingement of left shoulder [M75.42] Treatment Diagnosis:  M25.512  LEFT SHOULDER PAIN    Referral Source:  Mary Munguia MD Insurance:   Payor: [facility] / Plan: [facility] EMPLOYEES / Product Type: *No Product type* /                     Patient  verified yes     Visit #   Current  / Total 15 30   Time   In / Out 8:00 AM 9:00 AM   Total Treatment Time 60   Total Timed Codes 50      [facility] Totals Reminder:  bill using total billable   min of TIMED therapeutic procedures and modalities.   8-22 min = 1 unit; 23-37 min = 2 units; 38-52 min = 3 units; 53-67 min = 4 units; 68-82 min = 5 units            SUBJECTIVE    Pain Level (0-10 scale): 1    Any medication changes, allergies to medications, adverse drug reactions, diagnosis change, or new procedure performed?: [x] No    [] Yes (see summary sheet for update)  Medications: Verified on Patient Summary List    Subjective functional status/changes:     Patient reports doing much better today vs last week. Continues to have difficulty fastening her bra. States she was away last week and didn't do much lifting which is why her shoulder is doing better.    OBJECTIVE      Therapeutic Procedures:  Tx Min Billable or 1:1 Min (if diff from Tx Min) Procedure, Rationale, Specifics   35  48886 Therapeutic Exercise (timed):  increase ROM, strength, coordination, balance, and proprioception to improve patient's ability to progress to PLOF and address remaining functional goals. (see flow sheet as applicable)     Details if applicable:     15  14223 Manual Therapy (timed):  decrease

## 2024-12-17 ENCOUNTER — TELEPHONE (OUTPATIENT)
Age: 79
End: 2024-12-17

## 2024-12-17 ENCOUNTER — HOSPITAL ENCOUNTER (OUTPATIENT)
Facility: HOSPITAL | Age: 79
Setting detail: RECURRING SERIES
Discharge: HOME | End: 2024-12-20
Attending: ORTHOPAEDIC SURGERY
Payer: COMMERCIAL

## 2024-12-17 PROCEDURE — 97110 THERAPEUTIC EXERCISES: CPT

## 2024-12-17 PROCEDURE — 97140 MANUAL THERAPY 1/> REGIONS: CPT

## 2024-12-17 NOTE — TELEPHONE ENCOUNTER
Medication Refill Request    Kirsten Ford is requesting a refill of the following medication(s):   Amlodipine 90day supply  Please send refill to:     [facility]/pharmacy #[id number] - [place] - [address] -  [phone] - F [phone]  [address] [place] [address]  Phone: [phone] Fax: [phone]

## 2024-12-17 NOTE — PROGRESS NOTES
PHYSICAL THERAPY - DAILY TREATMENT NOTE (updated 3/23)      Date: 2024          Patient Name:  Kirsten Ford :  1945   Medical   Diagnosis:  Left shoulder pain, unspecified chronicity [M25.512]  Incomplete rotator cuff tear or rupture of left shoulder, not specified as traumatic [M75.112]  Degenerative superior labral anterior-to-posterior (SLAP) tear of left shoulder [S43.432A]  Subacromial impingement of left shoulder [M75.42] Treatment Diagnosis:  M25.512  LEFT SHOULDER PAIN    Referral Source:  Mary Munguia MD Insurance:   Payor: [facility] / Plan: [facility] EMPLOYEES / Product Type: *No Product type* /                     Patient  verified yes     Visit #   Current  / Total 16 30   Time   In / Out 8:45 AM 9:25 AM   Total Treatment Time 40   Total Timed Codes 30      [facility] BC Totals Reminder:  bill using total billable   min of TIMED therapeutic procedures and modalities.   8-22 min = 1 unit; 23-37 min = 2 units; 38-52 min = 3 units; 53-67 min = 4 units; 68-82 min = 5 units            SUBJECTIVE    Pain Level (0-10 scale): 2    Any medication changes, allergies to medications, adverse drug reactions, diagnosis change, or new procedure performed?: [x] No    [] Yes (see summary sheet for update)  Medications: Verified on Patient Summary List    Subjective functional status/changes:     Patient 15 minutes late for appointment due to an accident on the way to PT. Patient very apologetic. States she is doing very well, minor pain with sleeping on her L side \"I expect that will be for a while.\"    OBJECTIVE      Therapeutic Procedures:  Tx Min Billable or 1:1 Min (if diff from Tx Min) Procedure, Rationale, Specifics   15  76387 Therapeutic Exercise (timed):  increase ROM, strength, coordination, balance, and proprioception to improve patient's ability to progress to PLOF and address remaining functional goals. (see flow sheet as applicable)     Details if applicable:     72 [id number] Manual Therapy (timed):

## 2024-12-19 ENCOUNTER — HOSPITAL ENCOUNTER (OUTPATIENT)
Facility: HOSPITAL | Age: 79
Setting detail: RECURRING SERIES
Discharge: HOME | End: 2024-12-22
Attending: ORTHOPAEDIC SURGERY
Payer: COMMERCIAL

## 2024-12-19 DIAGNOSIS — F41.1 GAD (GENERALIZED ANXIETY DISORDER): ICD-10-CM

## 2024-12-19 PROCEDURE — 97140 MANUAL THERAPY 1/> REGIONS: CPT

## 2024-12-19 PROCEDURE — 97110 THERAPEUTIC EXERCISES: CPT

## 2024-12-19 RX ORDER — BUSPIRONE HYDROCHLORIDE 7.5 MG/1
TABLET ORAL
Qty: 4 TABLET | Refills: 0 | OUTPATIENT
Start: 2024-12-19

## 2024-12-19 RX ORDER — BUSPIRONE HYDROCHLORIDE 15 MG/1
15 TABLET ORAL 2 TIMES DAILY
Qty: 180 TABLET | Refills: 0 | Status: SHIPPED | OUTPATIENT
Start: 2024-12-19

## 2024-12-19 NOTE — PROGRESS NOTES
sheet as applicable)     Details if applicable:  obtained objective measures   15  15851 Manual Therapy (timed):  decrease pain, increase ROM, increase tissue extensibility, and decrease trigger points to improve patient's ability to progress to PLOF and address remaining functional goals.  The manual therapy interventions were performed at a separate and distinct time from the therapeutic activities interventions . (see flow sheet as applicable)     Details if applicable:  STM L infraspinatus, rhomboids, posterior deltoid   30     Total Total         Modalities Rationale:     decrease edema, decrease inflammation, decrease pain, and increase tissue extensibility to improve patient's ability to progress to PLOF and address remaining functional goals.       min [] Estim Unattended,             type/location:       []  w/ice    []  w/heat        min [] Estim Attended,             type/location:       []  w/ice   []  w/heat         []  w/US   []  TENS insruct            min []  Mechanical Traction,        type/lbs:        []  pro      []  sup           []  int       []  cont            []  before manual           []  after manual     min []  Ultrasound,         settings/location:     decl min  unbilled [x]  Ice     []  Heat            location/position: L shoulder/seated         min []  Vasopneumatic Device,      press/temp:   pre-treatment girth :    post-treatment girth :    measured at (landmark       location) :   If using vaso (only need to measure limb vaso being performed on)        min []  Other:      Skin assessment post-treatment (if applicable):    [x]  intact    []  redness- no adverse reaction                 []redness - adverse reaction:          [x]  Patient Education billed concurrently with other procedures   [x] Review HEP    [] Progressed/Changed HEP, detail:    [] Other detail:         Other Objective/Functional Measures  AROM L shoulder:  Flexion: 162 deg  Abduction: 155 with pain  Functional ER:

## 2024-12-20 NOTE — TELEPHONE ENCOUNTER
Future Appointments   Date Time Provider Department Center   12/30/2024  9:00 AM iTm King MD [facility] DEP

## 2024-12-30 ENCOUNTER — OFFICE VISIT (OUTPATIENT)
Age: 79
End: 2024-12-30
Payer: COMMERCIAL

## 2024-12-30 VITALS
OXYGEN SATURATION: 99 % | TEMPERATURE: 97.3 F | WEIGHT: 200 LBS | BODY MASS INDEX: 35.44 KG/M2 | HEIGHT: 63 IN | SYSTOLIC BLOOD PRESSURE: 161 MMHG | DIASTOLIC BLOOD PRESSURE: 82 MMHG | HEART RATE: 61 BPM | RESPIRATION RATE: 16 BRPM

## 2024-12-30 DIAGNOSIS — Z12.11 COLON CANCER SCREENING: ICD-10-CM

## 2024-12-30 DIAGNOSIS — I10 ESSENTIAL HYPERTENSION, BENIGN: ICD-10-CM

## 2024-12-30 DIAGNOSIS — Z71.89 ADVANCED CARE PLANNING/COUNSELING DISCUSSION: ICD-10-CM

## 2024-12-30 DIAGNOSIS — Z78.0 ASYMPTOMATIC MENOPAUSAL STATE: ICD-10-CM

## 2024-12-30 DIAGNOSIS — E66.811 OBESITY (BMI 30.0-34.9): ICD-10-CM

## 2024-12-30 DIAGNOSIS — F41.1 GAD (GENERALIZED ANXIETY DISORDER): ICD-10-CM

## 2024-12-30 DIAGNOSIS — Z12.31 ENCOUNTER FOR SCREENING MAMMOGRAM FOR MALIGNANT NEOPLASM OF BREAST: ICD-10-CM

## 2024-12-30 DIAGNOSIS — Z00.00 ROUTINE PHYSICAL EXAMINATION: Primary | ICD-10-CM

## 2024-12-30 PROCEDURE — 99214 OFFICE O/P EST MOD 30 MIN: CPT | Performed by: INTERNAL MEDICINE

## 2024-12-30 ASSESSMENT — ENCOUNTER SYMPTOMS
CONSTIPATION: 0
DIARRHEA: 0
BLOOD IN STOOL: 0
COUGH: 0
NAUSEA: 0
SHORTNESS OF BREATH: 0
ABDOMINAL PAIN: 0
VOMITING: 0

## 2024-12-30 ASSESSMENT — LIFESTYLE VARIABLES
HOW OFTEN DO YOU HAVE A DRINK CONTAINING ALCOHOL: NEVER
HOW MANY STANDARD DRINKS CONTAINING ALCOHOL DO YOU HAVE ON A TYPICAL DAY: PATIENT DOES NOT DRINK

## 2024-12-30 ASSESSMENT — PATIENT HEALTH QUESTIONNAIRE - PHQ9
SUM OF ALL RESPONSES TO PHQ QUESTIONS 1-9: 0
SUM OF ALL RESPONSES TO PHQ QUESTIONS 1-9: 0
1. LITTLE INTEREST OR PLEASURE IN DOING THINGS: NOT AT ALL
2. FEELING DOWN, DEPRESSED OR HOPELESS: NOT AT ALL
SUM OF ALL RESPONSES TO PHQ QUESTIONS 1-9: 0
SUM OF ALL RESPONSES TO PHQ9 QUESTIONS 1 & 2: 0
SUM OF ALL RESPONSES TO PHQ QUESTIONS 1-9: 0

## 2024-12-30 NOTE — PATIENT INSTRUCTIONS
Well Visit, Over 65: Care Instructions  Well visits can help you stay healthy. Your doctor has checked your overall health and may have suggested ways to take good care of yourself. Your doctor also may have recommended tests. You can help prevent illness with healthy eating, good sleep, vaccinations, regular exercise, and other steps.    Get the tests that you and your doctor decide on. Depending on your age and risks, examples might include hearing tests as well as screening for colon, breast, and lung cancer. Screening helps find diseases before any symptoms appear.   Eat healthy foods. Choose fruits, vegetables, whole grains, lean protein, and low-fat dairy foods. Limit saturated fat, and reduce salt.     Limit alcohol. Men should have no more than 2 drinks a day. Women should have no more than 1. For some people, no alcohol is the best choice.   Exercise. It can help prevent falls. Get at least 30 minutes of exercise on most days of the week. Walking, yoga, and cinthia chi can be good choices.     Reach and stay at your healthy weight. This will lower your risk for many health problems.   Take care of your mental health. Try to stay connected with friends, family, and community, and find ways to manage stress.     If you're feeling depressed or hopeless, talk to someone. A counselor can help. If you don't have a counselor, talk to your doctor.   Talk to your doctor if you think you may have a problem with alcohol or drug use. This includes prescription medicines and illegal drugs.     Avoid tobacco and nicotine: Don't smoke, vape, or chew. If you need help quitting, talk to your doctor.   Practice safer sex. Getting tested, using condoms or dental dams, and limiting sex partners can help prevent STIs.     Make an advance directive. This is a legal way to tell your family and doctor what you want to happen at the end of your life or when you can't speak for yourself.   Prevent problems where you can. Protect

## 2024-12-30 NOTE — ASSESSMENT & PLAN NOTE
At goal, but having swelling due to amlodipine. Will start compression socks and elevation of her feet when sitting. If this doesn't work then will need to stop amlodipine and likely try to increase spironolactone.

## 2024-12-30 NOTE — PROGRESS NOTES
Liane ordered.     DEXA: overdue, ordered on 8/14/23 but never completed.        The following acute and/or chronic problems were addressed today:    Cardiovascular Review  The patient has hypertension and obesity.  At last visit started on amlodipine.  She reports taking medications as instructed, side effects noted by patient include stomach cramping/nausea, but this resolved.  She is having some swelling in the ankles that is worse as the day goes on.  She generally follows a low sodium diet, exercises regularly.      Mental Health Review  Patient is seen for anxiety.  At last visit Buspar increased to 15mg BID from 7.5mg BID.  She thinks it is helping.  She reports all her stress has to do with her health and her family (grandson is bipolar). Available GAD7 reviewed.  Reports experiences the following side effects from the treatment: none.        The following sections were reviewed & updated as appropriate: Problem List, Allergies, PMH, PSH, FH, and SH.    Prior to Admission medications    Medication Sig Start Date End Date Taking? Authorizing Provider   busPIRone (BUSPAR) 15 MG tablet Take 15 mg by mouth 2 times daily TAKE 1 TABLETS BY MOUTH TWO (2) TIMES A DAY. 12/19/24  Yes Tim King MD   amLODIPine (NORVASC) 5 MG tablet Take 1 tablet by mouth daily 11/19/24  Yes Tim King MD   ibuprofen (ADVIL) 200 MG tablet Take 2 tablets by mouth daily   Yes ProviderPer MD   ALPRAZolam (XANAX) 0.25 MG tablet Take 1 tablet by mouth daily as needed for Anxiety for up to 180 days. Max Daily Amount: 0.25 mg 11/12/24 5/11/25 Yes Tim King MD   spironolactone (ALDACTONE) 50 MG tablet TAKE 1 TABLET BY MOUTH EVERY DAY 7/15/24  Yes Tim King MD   ASPIRIN 81 PO Take 81 mg by mouth daily   Yes ProviderPer MD   Cholecalciferol 50 MCG (2000 UT) TABS Take 1 tablet by mouth daily   Yes Automatic Reconciliation, Ar   fluticasone (FLONASE) 50 MCG/ACT nasal spray SPRAY

## 2024-12-30 NOTE — ACP (ADVANCE CARE PLANNING)
Advance Care Planning   Advance Care Planning (ACP) Physician/NP/PA (Provider) Conversation    Date of ACP Conversation: 12/30/24  Persons included in Conversation:  patient  Length of ACP Conversation in minutes: <16 minutes (Non-Billable)    We discussed the patient’s choices for care and treatment preferences in case of a health event that adversely affects decision-making abilities or is life-limiting. We discusses the differences between FULL CODE and DNR and when to consider a change in code status.  The limitations with CPR were reviewed.    Has ACP document(s) NOT on file - requested patient to provide.  She elects Full Code (Attempt Resuscitation)    The patient has appointed the following active healthcare agents:    Primary Decision Maker: Laisha Ford - Child    Secondary Decision Maker: Isi Morrison - Brother/Sister - [phone]     Tim King MD

## 2024-12-30 NOTE — ASSESSMENT & PLAN NOTE
Chronic issue, poorly controlled, and needs improvement. I have recommended the following interventions: dietary management education, guidance, and counseling and encourage exercise.  GLP-1 likely not going to be an affordable option at this time

## 2024-12-30 NOTE — ASSESSMENT & PLAN NOTE
Chronic issue, it is improved and control is acceptable. I reviewed treatment options with her, I reviewed life style changes to help improve mood, continue current treatment.

## 2024-12-31 ENCOUNTER — APPOINTMENT (OUTPATIENT)
Facility: HOSPITAL | Age: 79
End: 2024-12-31
Attending: ORTHOPAEDIC SURGERY
Payer: COMMERCIAL

## 2025-01-09 DIAGNOSIS — I10 ESSENTIAL (PRIMARY) HYPERTENSION: ICD-10-CM

## 2025-01-09 RX ORDER — SPIRONOLACTONE 50 MG/1
50 TABLET, FILM COATED ORAL DAILY
Qty: 90 TABLET | Refills: 1 | Status: SHIPPED | OUTPATIENT
Start: 2025-01-09

## 2025-01-11 DIAGNOSIS — I10 ESSENTIAL HYPERTENSION, BENIGN: ICD-10-CM

## 2025-01-13 RX ORDER — AMLODIPINE BESYLATE 2.5 MG/1
2.5 TABLET ORAL DAILY
Qty: 90 TABLET | Refills: 0 | Status: SHIPPED | OUTPATIENT
Start: 2025-01-13

## 2025-01-13 NOTE — TELEPHONE ENCOUNTER
6/30/2025 6 mos  Reading the last message from [facility] and no message from patient, I called patient to obtain more info as to med changes.   Patient states her angles/legs are not as swollen but legs are still shiny.  States did double spironolactone and 1/2 amlodipine as instructed she is feeling very tired but amlodipine does control BP.  Yesterday morning states 127/70 and this am 148/72.  Stated she had on her calendar to send [facility] a message today.  Please request that pharmacy cut amlodipine for patient if she is to continue taking.  Please advise

## 2025-02-20 DIAGNOSIS — F41.1 GAD (GENERALIZED ANXIETY DISORDER): ICD-10-CM

## 2025-02-21 RX ORDER — BUSPIRONE HYDROCHLORIDE 15 MG/1
15 TABLET ORAL 2 TIMES DAILY
Qty: 180 TABLET | Refills: 0 | Status: SHIPPED | OUTPATIENT
Start: 2025-02-21

## 2025-03-10 DIAGNOSIS — I10 ESSENTIAL (PRIMARY) HYPERTENSION: ICD-10-CM

## 2025-03-10 RX ORDER — SPIRONOLACTONE 50 MG/1
50 TABLET, FILM COATED ORAL DAILY
Qty: 90 TABLET | Refills: 1 | Status: CANCELLED | OUTPATIENT
Start: 2025-03-10

## 2025-03-10 RX ORDER — SPIRONOLACTONE 50 MG/1
100 TABLET, FILM COATED ORAL DAILY
Qty: 180 TABLET | Refills: 1 | Status: SHIPPED | OUTPATIENT
Start: 2025-03-10

## 2025-03-10 NOTE — TELEPHONE ENCOUNTER
Future Appointments   Date Time Provider Department Center   3/14/2025 11:10 AM Rich Haskins MD [facility] BS [facility]   6/30/2025  8:20 AM Tim King MD [facility] DEP

## 2025-03-30 DIAGNOSIS — F41.1 GAD (GENERALIZED ANXIETY DISORDER): ICD-10-CM

## 2025-03-30 RX ORDER — BUSPIRONE HYDROCHLORIDE 15 MG/1
15 TABLET ORAL 2 TIMES DAILY
Qty: 180 TABLET | Refills: 1 | Status: SHIPPED | OUTPATIENT
Start: 2025-03-30

## 2025-07-04 DIAGNOSIS — I10 ESSENTIAL (PRIMARY) HYPERTENSION: ICD-10-CM

## 2025-07-05 DIAGNOSIS — I10 ESSENTIAL (PRIMARY) HYPERTENSION: ICD-10-CM

## 2025-07-06 RX ORDER — SPIRONOLACTONE 50 MG/1
50 TABLET, FILM COATED ORAL DAILY
Qty: 90 TABLET | Refills: 1 | OUTPATIENT
Start: 2025-07-06

## 2025-07-06 RX ORDER — SPIRONOLACTONE 50 MG/1
100 TABLET, FILM COATED ORAL DAILY
Qty: 180 TABLET | Refills: 1 | OUTPATIENT
Start: 2025-07-06

## 2025-08-25 SDOH — ECONOMIC STABILITY: FOOD INSECURITY: WITHIN THE PAST 12 MONTHS, YOU WORRIED THAT YOUR FOOD WOULD RUN OUT BEFORE YOU GOT MONEY TO BUY MORE.: NEVER TRUE

## 2025-08-25 SDOH — ECONOMIC STABILITY: TRANSPORTATION INSECURITY
IN THE PAST 12 MONTHS, HAS THE LACK OF TRANSPORTATION KEPT YOU FROM MEDICAL APPOINTMENTS OR FROM GETTING MEDICATIONS?: NO

## 2025-08-25 SDOH — ECONOMIC STABILITY: FOOD INSECURITY: WITHIN THE PAST 12 MONTHS, THE FOOD YOU BOUGHT JUST DIDN'T LAST AND YOU DIDN'T HAVE MONEY TO GET MORE.: NEVER TRUE

## 2025-08-25 SDOH — ECONOMIC STABILITY: INCOME INSECURITY: IN THE LAST 12 MONTHS, WAS THERE A TIME WHEN YOU WERE NOT ABLE TO PAY THE MORTGAGE OR RENT ON TIME?: NO

## 2025-08-25 SDOH — ECONOMIC STABILITY: TRANSPORTATION INSECURITY
IN THE PAST 12 MONTHS, HAS LACK OF TRANSPORTATION KEPT YOU FROM MEETINGS, WORK, OR FROM GETTING THINGS NEEDED FOR DAILY LIVING?: NO

## 2025-08-27 ENCOUNTER — OFFICE VISIT (OUTPATIENT)
Age: 80
End: 2025-08-27
Payer: COMMERCIAL

## 2025-08-27 VITALS
BODY MASS INDEX: 35.65 KG/M2 | TEMPERATURE: 97.4 F | SYSTOLIC BLOOD PRESSURE: 167 MMHG | RESPIRATION RATE: 16 BRPM | HEART RATE: 64 BPM | HEIGHT: 63 IN | OXYGEN SATURATION: 98 % | WEIGHT: 201.2 LBS | DIASTOLIC BLOOD PRESSURE: 83 MMHG

## 2025-08-27 DIAGNOSIS — E66.01 MORBID OBESITY (HCC): ICD-10-CM

## 2025-08-27 DIAGNOSIS — F41.1 GAD (GENERALIZED ANXIETY DISORDER): ICD-10-CM

## 2025-08-27 DIAGNOSIS — K21.9 GASTROESOPHAGEAL REFLUX DISEASE WITHOUT ESOPHAGITIS: ICD-10-CM

## 2025-08-27 DIAGNOSIS — Z12.11 COLON CANCER SCREENING: ICD-10-CM

## 2025-08-27 DIAGNOSIS — I10 ESSENTIAL HYPERTENSION, BENIGN: Primary | ICD-10-CM

## 2025-08-27 DIAGNOSIS — I10 ESSENTIAL HYPERTENSION, BENIGN: ICD-10-CM

## 2025-08-27 PROBLEM — S83.206A ACUTE MENISCAL TEAR OF RIGHT KNEE: Status: RESOLVED | Noted: 2018-03-19 | Resolved: 2025-08-27

## 2025-08-27 LAB
ALBUMIN SERPL-MCNC: 4.3 G/DL (ref 3.5–5.2)
ALBUMIN/GLOB SERPL: 1.7 (ref 1.1–2.2)
ALP SERPL-CCNC: 79 U/L (ref 35–104)
ALT SERPL-CCNC: 14 U/L (ref 10–35)
ANION GAP SERPL CALC-SCNC: 11 MMOL/L (ref 2–14)
AST SERPL-CCNC: 17 U/L (ref 10–35)
BILIRUB SERPL-MCNC: 1.1 MG/DL (ref 0–1.2)
BUN SERPL-MCNC: 20 MG/DL (ref 8–23)
BUN/CREAT SERPL: 25 (ref 12–20)
CALCIUM SERPL-MCNC: 10.2 MG/DL (ref 8.8–10.2)
CHLORIDE SERPL-SCNC: 102 MMOL/L (ref 98–107)
CO2 SERPL-SCNC: 27 MMOL/L (ref 20–29)
CREAT SERPL-MCNC: 0.78 MG/DL (ref 0.6–1)
ERYTHROCYTE [DISTWIDTH] IN BLOOD BY AUTOMATED COUNT: 12.8 % (ref 11.5–14.5)
GLOBULIN SER CALC-MCNC: 2.5 G/DL (ref 2–4)
GLUCOSE SERPL-MCNC: 100 MG/DL (ref 65–100)
HCT VFR BLD AUTO: 41.3 % (ref 35–47)
HGB BLD-MCNC: 14 G/DL (ref 11.5–16)
MCH RBC QN AUTO: 32.2 PG (ref 26–34)
MCHC RBC AUTO-ENTMCNC: 33.9 G/DL (ref 30–36.5)
MCV RBC AUTO: 94.9 FL (ref 80–99)
NRBC # BLD: 0 K/UL (ref 0–0.01)
NRBC BLD-RTO: 0 PER 100 WBC
PLATELET # BLD AUTO: 243 K/UL (ref 150–400)
PMV BLD AUTO: 10.4 FL (ref 8.9–12.9)
POTASSIUM SERPL-SCNC: 4.7 MMOL/L (ref 3.5–5.1)
PROT SERPL-MCNC: 6.8 G/DL (ref 6.4–8.3)
RBC # BLD AUTO: 4.35 M/UL (ref 3.8–5.2)
SODIUM SERPL-SCNC: 140 MMOL/L (ref 136–145)
WBC # BLD AUTO: 5 K/UL (ref 3.6–11)

## 2025-08-27 PROCEDURE — 3079F DIAST BP 80-89 MM HG: CPT | Performed by: INTERNAL MEDICINE

## 2025-08-27 PROCEDURE — 3077F SYST BP >= 140 MM HG: CPT | Performed by: INTERNAL MEDICINE

## 2025-08-27 PROCEDURE — 1123F ACP DISCUSS/DSCN MKR DOCD: CPT | Performed by: INTERNAL MEDICINE

## 2025-08-27 PROCEDURE — 99214 OFFICE O/P EST MOD 30 MIN: CPT | Performed by: INTERNAL MEDICINE

## 2025-08-27 RX ORDER — BUSPIRONE HYDROCHLORIDE 10 MG/1
20 TABLET ORAL 2 TIMES DAILY
Qty: 360 TABLET | Refills: 0 | Status: SHIPPED | OUTPATIENT
Start: 2025-08-27

## 2025-08-27 RX ORDER — ALPRAZOLAM 0.5 MG
0.25 TABLET ORAL 2 TIMES DAILY PRN
Qty: 5 TABLET | Refills: 0 | Status: SHIPPED | OUTPATIENT
Start: 2025-08-27 | End: 2025-09-26

## 2025-08-27 ASSESSMENT — ENCOUNTER SYMPTOMS
DIARRHEA: 0
ABDOMINAL PAIN: 0
COUGH: 0
SHORTNESS OF BREATH: 0
NAUSEA: 0
BLOOD IN STOOL: 0
CONSTIPATION: 0
VOMITING: 0

## 2025-08-27 ASSESSMENT — PATIENT HEALTH QUESTIONNAIRE - PHQ9
SUM OF ALL RESPONSES TO PHQ QUESTIONS 1-9: 0
2. FEELING DOWN, DEPRESSED OR HOPELESS: NOT AT ALL
SUM OF ALL RESPONSES TO PHQ QUESTIONS 1-9: 0
1. LITTLE INTEREST OR PLEASURE IN DOING THINGS: NOT AT ALL
SUM OF ALL RESPONSES TO PHQ QUESTIONS 1-9: 0
SUM OF ALL RESPONSES TO PHQ QUESTIONS 1-9: 0

## 2025-09-03 ENCOUNTER — TELEPHONE (OUTPATIENT)
Age: 80
End: 2025-09-03